# Patient Record
Sex: FEMALE | Race: WHITE | NOT HISPANIC OR LATINO | Employment: FULL TIME | URBAN - NONMETROPOLITAN AREA
[De-identification: names, ages, dates, MRNs, and addresses within clinical notes are randomized per-mention and may not be internally consistent; named-entity substitution may affect disease eponyms.]

---

## 2017-03-23 ENCOUNTER — HOSPITAL ENCOUNTER (EMERGENCY)
Facility: HOSPITAL | Age: 22
Discharge: HOME | End: 2017-03-23
Attending: FAMILY MEDICINE | Admitting: FAMILY MEDICINE

## 2017-03-23 ENCOUNTER — APPOINTMENT (OUTPATIENT)
Dept: GENERAL RADIOLOGY | Facility: HOSPITAL | Age: 22
End: 2017-03-23

## 2017-03-23 VITALS
HEIGHT: 63 IN | OXYGEN SATURATION: 98 % | HEART RATE: 85 BPM | BODY MASS INDEX: 32.96 KG/M2 | DIASTOLIC BLOOD PRESSURE: 73 MMHG | RESPIRATION RATE: 18 BRPM | WEIGHT: 186 LBS | TEMPERATURE: 98.4 F | SYSTOLIC BLOOD PRESSURE: 107 MMHG

## 2017-03-23 DIAGNOSIS — M79.675 GREAT TOE PAIN, LEFT: ICD-10-CM

## 2017-03-23 DIAGNOSIS — T14.8XXA SPLINTER IN SKIN: Primary | ICD-10-CM

## 2017-03-23 PROCEDURE — 99282 EMERGENCY DEPT VISIT SF MDM: CPT

## 2017-03-23 PROCEDURE — 73660 X-RAY EXAM OF TOE(S): CPT

## 2017-03-23 RX ORDER — CEPHALEXIN 500 MG/1
500 CAPSULE ORAL 3 TIMES DAILY
COMMUNITY
End: 2017-05-01

## 2017-03-23 NOTE — ED PROVIDER NOTES
Subjective   HPI Comments: Comes in after she had splinter in her left great toe - removed part of it and still feels small body .   Now has developed scab on top .       Patient is a 21 y.o. female presenting with lower extremity pain.   History provided by:  Patient  Lower Extremity Issue   Location:  Foot  Time since incident:  24 hours  Injury: yes    Mechanism of injury: fall    Mechanism of injury comment:  Had splinter in left great toe = she got some out but  very sure she still has it .  Fall:     Fall occurred:  Walking    Impact surface:  Gravel    Point of impact:  Feet    Entrapped after fall: no    Foot location:  Dorsum of L foot and L foot  Pain details:     Quality:  Aching    Radiates to:  Does not radiate    Severity:  No pain    Onset quality:  Sudden    Duration:  1 day    Timing:  Constant    Progression:  Worsening  Chronicity:  New  Dislocation: no    Foreign body present:  Wood (feels splinter in left great toe . )  Tetanus status:  Unknown  Prior injury to area:  No  Relieved by:  Nothing  Worsened by:  Nothing      Review of Systems   Constitutional: Negative.    HENT: Negative.    Respiratory: Negative.    Cardiovascular: Negative.    Gastrointestinal: Negative.    Musculoskeletal: Negative.    Allergic/Immunologic: Negative.    Neurological: Negative.    Hematological: Negative.    Psychiatric/Behavioral: Negative.        History reviewed. No pertinent past medical history.    No Known Allergies    History reviewed. No pertinent surgical history.    History reviewed. No pertinent family history.    Social History     Social History   • Marital status:      Spouse name: N/A   • Number of children: N/A   • Years of education: N/A     Social History Main Topics   • Smoking status: Never Smoker   • Smokeless tobacco: None   • Alcohol use No   • Drug use: No   • Sexual activity: Yes     Other Topics Concern   • None     Social History Narrative           Objective    /73  Pulse  "85  Temp 98.4 °F (36.9 °C) (Oral)   Resp 18  Ht 63\" (160 cm)  Wt 186 lb (84.4 kg)  SpO2 98%  BMI 32.95 kg/m2    Physical Exam   Constitutional: She is oriented to person, place, and time. She appears well-developed and well-nourished.   HENT:   Head: Normocephalic and atraumatic.   Right Ear: External ear normal.   Left Ear: External ear normal.   Nose: Nose normal.   Mouth/Throat: Oropharynx is clear and moist.   Eyes: Conjunctivae and EOM are normal. Pupils are equal, round, and reactive to light.   Neck: Normal range of motion. Neck supple.   Cardiovascular: Normal rate and regular rhythm.    Pulmonary/Chest: Effort normal and breath sounds normal. No accessory muscle usage. No respiratory distress. She exhibits no tenderness and no deformity.   Abdominal: Soft. There is no tenderness.   Musculoskeletal: Normal range of motion.        Neurological: She is alert and oriented to person, place, and time. She has normal reflexes.   Skin: Skin is warm.   Nursing note and vitals reviewed.      Procedures         ED Course  ED Course      Labs Reviewed - No data to display  Xr Toe 2+ View Left    Result Date: 3/23/2017  Narrative: Patient Name:  JULIETH GRAY Patient ID:  [id number] Ordering:  CHERI CASTILLO Attending:  CHERI CASTILLO Referring:  CHERI CASTILLO ------------------------------------------------ Procedure:  Left great toe.    Indication:  Injury, possible splinter.   . Technique:  Three views   . Prior relevant exam:  None.   No evidence of acute bony, soft tissue, or joint abnormality is noted. Bone mineralization is within normal limits. The visualized joint spaces appear intact. No radiopaque foreign bodies.     Impression: CONCLUSION: 1.  Normal left great toe. No fracture. No radiopaque foreign bodies.   Electronically signed by:  Kaleb Arellano MD  3/23/2017 2:19 PM CDT Workstation: [id number]    No visible  Foreign body noted.   Discussed with pt about possible small splinter left " inside - follow up with podiatry recommended.   She is scheduled to see dr chaudhary tomorrow AM .               MDM  Number of Diagnoses or Management Options  Great toe pain, left: new and does not require workup  Splinter in skin: new and does not require workup      Final diagnoses:   Splinter in skin   Great toe pain, left            Skylar Marquez MD  03/25/17 [id number]

## 2017-03-24 ENCOUNTER — OFFICE VISIT (OUTPATIENT)
Dept: PODIATRY | Facility: CLINIC | Age: 22
End: 2017-03-24

## 2017-03-24 VITALS — WEIGHT: 186 LBS | HEIGHT: 63 IN | BODY MASS INDEX: 32.96 KG/M2

## 2017-03-24 DIAGNOSIS — S91.142A: Primary | ICD-10-CM

## 2017-03-24 DIAGNOSIS — M79.672 LEFT FOOT PAIN: ICD-10-CM

## 2017-03-24 PROCEDURE — 10121 INC&RMVL FB SUBQ TISS COMP: CPT | Performed by: PODIATRIST

## 2017-03-24 PROCEDURE — 99203 OFFICE O/P NEW LOW 30 MIN: CPT | Performed by: PODIATRIST

## 2017-03-24 NOTE — PROGRESS NOTES
Tanja Chapa  1995  21 y.o. female   Patient presents today for splinter of the left great toe.  Patient states she did it on Wednesday.  Patient was seen in the ER yesterday.      3/24/2017  Chief Complaint   Patient presents with   • Left Foot - ER f/u, Foreign Body           History of Present Illness    This is a pleasant 21-year-old female who presents to clinic today with chief complaint of left foot pain.  States that last Wednesday she punctured the top of her left great toe with a stick.  The next day she went to the urgent care where they attempted to remove any remaining foreign body but were unsuccessful.  They discharged her from urgent care and told her that it would eventually come out of his own.  She continued to have pain and subsequently went to the emergency department on Thursday.  The ED physician contacted me and asked that this patient could follow up in my clinic.  She presents today for follow-up visit.  She is currently taking Keflex antibiotics for this.  States that yesterday her toe is much more red than it is today.  She still feels as if there is a foreign body in her toe.  However, the skin has healed over and she can no longer see it.  She has no other pedal complaints.         No past medical history on file.      No past surgical history on file.      No family history on file.      Social History     Social History   • Marital status:      Spouse name: N/A   • Number of children: N/A   • Years of education: N/A     Occupational History   • Not on file.     Social History Main Topics   • Smoking status: Never Smoker   • Smokeless tobacco: Not on file   • Alcohol use No   • Drug use: No   • Sexual activity: Yes     Other Topics Concern   • Not on file     Social History Narrative         Current Outpatient Prescriptions   Medication Sig Dispense Refill   • cephalexin (KEFLEX) 500 MG capsule Take 500 mg by mouth 3 (Three) Times a Day.       No current  "facility-administered medications for this visit.          OBJECTIVE    Ht 63\" (160 cm)  Wt 186 lb (84.4 kg)  BMI 32.95 kg/m2      Review of Systems   Constitutional: Negative for chills and fever.   Cardiovascular: Negative for chest pain.   Gastrointestinal: Negative for constipation, diarrhea, nausea and vomiting.   Skin: Negative for wound.   Musculoskeletal: left foot pain      Constitutional: well developed, well nourished    HEENT: Normocephalic and atraumatic, normal hearing    Respiratory: Non labored respirations noted    Cardiovascular:    DP/PT pulses palpable    CFT brisk  to all digits  Skin temp is warm to warm from proximal tibia to distal digits  Pedal hair growth present.   No edema noted   Mild erythema noted to the left hallux dorsally.    Musculoskeletal:  Muscle strength is 5/5 for all muscle groups tested   ROM of the 1st MTP is full without pain or crepitus  ROM of the MTJ is full without pain or crepitus    ROM of the STJ is full without pain or crepitus    ROM of the ankle joint is full without pain or crepitus    Significant tenderness palpation to the left hallux  Rectus foot type     Dermatological:   Nails 1-5 are within normal limits for length and thickness    Skin is warm, dry and intact    Webspaces 1-4 bilateral are clean, dry and intact.   No subcutaneous nodules or masses noted    Small puncture wound noted to the dorsal aspect of the left hallux approximately 0.5 cm proximal to the nail plate.  There is no foreign body directly visible.    Neurological:   Protective sensation intact    Sensation intact to light touch    DTR intact    Psychiatric: A&O x 3 with normal mood and affect. NAD.           Foreign Body Removal  Date/Time: 3/24/2017 12:24 PM  Performed by: AZAR CARO  Authorized by: AZAR CARO   Consent: Verbal consent obtained. Written consent obtained.  Risks and benefits: risks, benefits and alternatives were discussed  Consent given by: patient  Patient " understanding: patient states understanding of the procedure being performed  Patient identity confirmed: verbally with patient  Intake: left great toe.  Anesthesia: digital block    Anesthesia:  Anesthesia: digital block  Local Anesthetic: lidocaine 2% without epinephrine   Sedation:  Patient sedated: no    Patient restrained: no  Patient cooperative: yes  Complexity: complex  1 objects recovered.  Objects recovered: 0.5 cm sliver of wood  Post-procedure assessment: foreign body removed  Patient tolerance: Patient tolerated the procedure well with no immediate complications            ASSESSMENT AND PLAN    Tanja was seen today for er f/u and foreign body.    Diagnoses and all orders for this visit:    Puncture wound with foreign body of left great toe without damage to nail, initial encounter    Left foot pain    - Comprehensive foot and ankle exam performed  - X-rays reviewed taken in the ED.  No foreign body noted  - There was concern of remaining deep foreign body.  Verbal and written consent were obtained and the left hallux was localized with 2% lidocaine plain. The toe was prepped with betadine emanuel a penrose tourniquet was applied to the hallux. Next an incision was made over the visible puncture wound with a #15 blade.  The incision site was explored with a blunt dissection using a  Hemostat. Dissection was carried down deep to the subcutaneous tissue and a 0.5 cm long foreign body was excised and removed.  The area was then flushed with copious amounts of normal saline solution.  Next the incision site was reapproximated loosely with 4-0 nylon in a simple interrupted technique.  Antibiotic ointment and a dry sterile dressing was applied.  The patient tolerated the procedure well without immediate complication.  - Keep dressing c/d/i until f/u visit  - continue abx until course is complete  - All questions were answered and the patient is in agreement with the current treatment plan.  - RTC Tuesday  3/28/17            This document has been electronically signed by Crescencio Magdaleno DPM on March 24, 2017 12:14 PM     3/24/2017  12:14 PM

## 2017-03-28 ENCOUNTER — OFFICE VISIT (OUTPATIENT)
Dept: PODIATRY | Facility: CLINIC | Age: 22
End: 2017-03-28

## 2017-03-28 VITALS — WEIGHT: 186 LBS | HEIGHT: 63 IN | BODY MASS INDEX: 32.96 KG/M2

## 2017-03-28 DIAGNOSIS — S91.142A: Primary | ICD-10-CM

## 2017-03-28 PROCEDURE — 99024 POSTOP FOLLOW-UP VISIT: CPT | Performed by: PODIATRIST

## 2017-03-28 NOTE — PROGRESS NOTES
"Tanja Franciashaina Chapa  1995  21 y.o. female   Patient presents today for f/u of the left great toe.      3/28/2017  Chief Complaint   Patient presents with   • Left Foot - ER F/U, Foreign Body           History of Present Illness    Patient presents to clinic today for follow-up of her great toe puncture wound.  Last visit for body was removed in the clinic.  She is doing very well today.  She has no pain today.  She    is taking her antibiotics.  She has no new complaints today.      No past medical history on file.      No past surgical history on file.      No family history on file.      Social History     Social History   • Marital status:      Spouse name: N/A   • Number of children: N/A   • Years of education: N/A     Occupational History   • Not on file.     Social History Main Topics   • Smoking status: Never Smoker   • Smokeless tobacco: Not on file   • Alcohol use No   • Drug use: No   • Sexual activity: Yes     Other Topics Concern   • Not on file     Social History Narrative         Current Outpatient Prescriptions   Medication Sig Dispense Refill   • cephalexin (KEFLEX) 500 MG capsule Take 500 mg by mouth 3 (Three) Times a Day.       No current facility-administered medications for this visit.          OBJECTIVE    Ht 63\" (160 cm)  Wt 186 lb (84.4 kg)  BMI 32.95 kg/m2      Review of Systems   Constitutional: Negative for chills and fever.   Cardiovascular: Negative for chest pain.   Gastrointestinal: Negative for constipation, diarrhea, nausea and vomiting.   Skin: Negative for wound.   Musculoskeletal:  Negative foot pain      Constitutional: well developed, well nourished    HEENT: Normocephalic and atraumatic, normal hearing    Respiratory: Non labored respirations noted    LLE:   No erythema or edema noted to the hallux.  Incision site is well approximated with sutures intact.  No signs of dehiscence noted.  No signs of infection noted.          Procedures        ASSESSMENT AND " REGAN Agrawal was seen today for er f/u and foreign body.    Diagnoses and all orders for this visit:    Puncture wound with foreign body of left great toe without damage to nail, subsequent encounter    - Sterile dressing change   - Continue antibiotics until course complete.   - All questions were answered and the patient is in agreement with the current treatment plan.  - RTC 1 week            This document has been electronically signed by Crescencio Magdaleno DPM on March 28, 2017 6:08 PM     3/28/2017  6:08 PM

## 2017-04-04 ENCOUNTER — OFFICE VISIT (OUTPATIENT)
Dept: PODIATRY | Facility: CLINIC | Age: 22
End: 2017-04-04

## 2017-04-04 VITALS — BODY MASS INDEX: 32.96 KG/M2 | WEIGHT: 186 LBS | HEIGHT: 63 IN

## 2017-04-04 DIAGNOSIS — S91.142A: Primary | ICD-10-CM

## 2017-04-04 PROCEDURE — 99212 OFFICE O/P EST SF 10 MIN: CPT | Performed by: PODIATRIST

## 2017-04-04 NOTE — PROGRESS NOTES
"Tanja Feldman Eduard  1995  21 y.o. female   Patient presents today for f/u of the left great toe.      4/4/2017  Chief Complaint   Patient presents with   • Left Foot - Follow-up           History of Present Illness    Patient presents to clinic today for follow-up of her great toe puncture wound She is doing very well today.  She has no pain today.  She has no new complaints today.      No past medical history on file.      No past surgical history on file.      No family history on file.      Social History     Social History   • Marital status:      Spouse name: N/A   • Number of children: N/A   • Years of education: N/A     Occupational History   • Not on file.     Social History Main Topics   • Smoking status: Never Smoker   • Smokeless tobacco: Not on file   • Alcohol use No   • Drug use: No   • Sexual activity: Yes     Other Topics Concern   • Not on file     Social History Narrative         Current Outpatient Prescriptions   Medication Sig Dispense Refill   • cephalexin (KEFLEX) 500 MG capsule Take 500 mg by mouth 3 (Three) Times a Day.       No current facility-administered medications for this visit.          OBJECTIVE    Ht 63\" (160 cm)  Wt 186 lb (84.4 kg)  BMI 32.95 kg/m2      Review of Systems   Constitutional: Negative for chills and fever.   Cardiovascular: Negative for chest pain.   Gastrointestinal: Negative for constipation, diarrhea, nausea and vomiting.   Skin: Negative for wound.   Musculoskeletal:  Negative foot pain      Constitutional: well developed, well nourished    HEENT: Normocephalic and atraumatic, normal hearing    Respiratory: Non labored respirations noted    LLE:   No erythema or edema noted to the hallux.  Incision site is well approximated with sutures intact.  No signs of dehiscence noted.  No signs of infection noted.          Procedures        ASSESSMENT AND PLAN    Tanja was seen today for follow-up.    Diagnoses and all orders for this visit:    Puncture " wound with foreign body of left great toe without damage to nail, subsequent encounter    - Sutures removed. Dressed with bacitracin and a band-aid.   - All questions were answered and the patient is in agreement with the current treatment plan.  - RTC 2 weeks            This document has been electronically signed by Crescencio Magdaleno DPM on April 4, 2017 6:22 PM     4/4/2017  6:22 PM

## 2017-04-18 ENCOUNTER — OFFICE VISIT (OUTPATIENT)
Dept: PODIATRY | Facility: CLINIC | Age: 22
End: 2017-04-18

## 2017-04-18 VITALS — BODY MASS INDEX: 32.96 KG/M2 | HEIGHT: 63 IN | WEIGHT: 186 LBS

## 2017-04-18 DIAGNOSIS — S91.142A: Primary | ICD-10-CM

## 2017-04-18 PROCEDURE — 99212 OFFICE O/P EST SF 10 MIN: CPT | Performed by: PODIATRIST

## 2017-04-18 NOTE — PROGRESS NOTES
"Tanja Feldman Eduard  1995  21 y.o. female   Patient presents today for f/u of the left great toe.      4/18/17    Chief Complaint   Patient presents with   • Left Foot - Follow-up           History of Present Illness    Patient presents to clinic today for follow-up of her great toe puncture wound She is doing very well today.  She has no pain today.  She has no new complaints today.      No past medical history on file.      No past surgical history on file.      No family history on file.      Social History     Social History   • Marital status:      Spouse name: N/A   • Number of children: N/A   • Years of education: N/A     Occupational History   • Not on file.     Social History Main Topics   • Smoking status: Never Smoker   • Smokeless tobacco: Not on file   • Alcohol use No   • Drug use: No   • Sexual activity: Yes     Other Topics Concern   • Not on file     Social History Narrative         Current Outpatient Prescriptions   Medication Sig Dispense Refill   • cephalexin (KEFLEX) 500 MG capsule Take 500 mg by mouth 3 (Three) Times a Day.       No current facility-administered medications for this visit.          OBJECTIVE    Ht 63\" (160 cm)  Wt 186 lb (84.4 kg)  BMI 32.95 kg/m2      Review of Systems   Constitutional: Negative for chills and fever.   Cardiovascular: Negative for chest pain.   Gastrointestinal: Negative for constipation, diarrhea, nausea and vomiting.   Skin: Negative for wound.   Musculoskeletal:  Negative foot pain      Constitutional: well developed, well nourished    HEENT: Normocephalic and atraumatic, normal hearing    Respiratory: Non labored respirations noted    LLE:   No erythema or edema noted to the hallux.  Incision site is healed.    No signs of infection noted.          Procedures        ASSESSMENT AND PLAN    Tanja was seen today for follow-up.    Diagnoses and all orders for this visit:    Puncture wound with foreign body of left great toe without damage to " nail, subsequent encounter    - pt is healed  - will dc from care  - All questions were answered and the patient is in agreement with the current treatment plan.  - RTC prn              This document has been electronically signed by Crescencio Magdaleno DPM on April 20, 2017 10:36 AM     4/20/2017  10:36 AM

## 2017-05-01 ENCOUNTER — INITIAL PRENATAL (OUTPATIENT)
Dept: OBSTETRICS AND GYNECOLOGY | Facility: CLINIC | Age: 22
End: 2017-05-01

## 2017-05-01 ENCOUNTER — APPOINTMENT (OUTPATIENT)
Dept: LAB | Facility: HOSPITAL | Age: 22
End: 2017-05-01

## 2017-05-01 VITALS
HEIGHT: 63 IN | SYSTOLIC BLOOD PRESSURE: 132 MMHG | BODY MASS INDEX: 33.84 KG/M2 | WEIGHT: 191 LBS | DIASTOLIC BLOOD PRESSURE: 83 MMHG

## 2017-05-01 DIAGNOSIS — Z34.81 PRENATAL CARE, SUBSEQUENT PREGNANCY, FIRST TRIMESTER: Primary | ICD-10-CM

## 2017-05-01 DIAGNOSIS — Z3A.00 WEEKS OF GESTATION OF PREGNANCY NOT SPECIFIED: ICD-10-CM

## 2017-05-01 DIAGNOSIS — Z32.00 PREGNANCY EXAMINATION OR TEST, PREGNANCY UNCONFIRMED: ICD-10-CM

## 2017-05-01 LAB
ABO GROUP BLD: NORMAL
B-HCG UR QL: POSITIVE
BACTERIA UR QL AUTO: ABNORMAL /HPF
BASOPHILS # BLD AUTO: 0.01 10*3/MM3 (ref 0–0.2)
BASOPHILS NFR BLD AUTO: 0.1 % (ref 0–2)
BILIRUB UR QL STRIP: NEGATIVE
BLD GP AB SCN SERPL QL: NEGATIVE
CLARITY UR: ABNORMAL
COLOR UR: YELLOW
DEPRECATED RDW RBC AUTO: 39.1 FL (ref 36.4–46.3)
EOSINOPHIL # BLD AUTO: 0.02 10*3/MM3 (ref 0–0.7)
EOSINOPHIL NFR BLD AUTO: 0.2 % (ref 0–7)
ERYTHROCYTE [DISTWIDTH] IN BLOOD BY AUTOMATED COUNT: 12.9 % (ref 11.5–14.5)
GLUCOSE UR STRIP-MCNC: NEGATIVE MG/DL
HCT VFR BLD AUTO: 36.1 % (ref 35–45)
HGB BLD-MCNC: 12.6 G/DL (ref 12–15.5)
HGB UR QL STRIP.AUTO: NEGATIVE
HYALINE CASTS UR QL AUTO: ABNORMAL /LPF
IMM GRANULOCYTES # BLD: 0.02 10*3/MM3 (ref 0–0.02)
IMM GRANULOCYTES NFR BLD: 0.2 % (ref 0–0.5)
INTERNAL NEGATIVE CONTROL: NEGATIVE
INTERNAL POSITIVE CONTROL: POSITIVE
KETONES UR QL STRIP: NEGATIVE
LEUKOCYTE ESTERASE UR QL STRIP.AUTO: ABNORMAL
LYMPHOCYTES # BLD AUTO: 2.17 10*3/MM3 (ref 0.6–4.2)
LYMPHOCYTES NFR BLD AUTO: 27.1 % (ref 10–50)
Lab: ABNORMAL
Lab: NORMAL
MCH RBC QN AUTO: 28.8 PG (ref 26.5–34)
MCHC RBC AUTO-ENTMCNC: 34.9 G/DL (ref 31.4–36)
MCV RBC AUTO: 82.4 FL (ref 80–98)
MONOCYTES # BLD AUTO: 0.54 10*3/MM3 (ref 0–0.9)
MONOCYTES NFR BLD AUTO: 6.7 % (ref 0–12)
NEUTROPHILS # BLD AUTO: 5.25 10*3/MM3 (ref 2–8.6)
NEUTROPHILS NFR BLD AUTO: 65.7 % (ref 37–80)
NITRITE UR QL STRIP: NEGATIVE
PH UR STRIP.AUTO: 6 [PH] (ref 5–9)
PLATELET # BLD AUTO: 233 10*3/MM3 (ref 150–450)
PMV BLD AUTO: 9.6 FL (ref 8–12)
PROT UR QL STRIP: NEGATIVE
RBC # BLD AUTO: 4.38 10*6/MM3 (ref 3.77–5.16)
RBC # UR: ABNORMAL /HPF
REF LAB TEST METHOD: ABNORMAL
RH BLD: POSITIVE
SP GR UR STRIP: 1.02 (ref 1–1.03)
SQUAMOUS #/AREA URNS HPF: ABNORMAL /HPF
UROBILINOGEN UR QL STRIP: ABNORMAL
WBC NRBC COR # BLD: 8.01 10*3/MM3 (ref 3.2–9.8)
WBC UR QL AUTO: ABNORMAL /HPF

## 2017-05-01 PROCEDURE — 36415 COLL VENOUS BLD VENIPUNCTURE: CPT | Performed by: ADVANCED PRACTICE MIDWIFE

## 2017-05-01 PROCEDURE — 81025 URINE PREGNANCY TEST: CPT | Performed by: ADVANCED PRACTICE MIDWIFE

## 2017-05-01 PROCEDURE — 81001 URINALYSIS AUTO W/SCOPE: CPT | Performed by: ADVANCED PRACTICE MIDWIFE

## 2017-05-01 PROCEDURE — 86701 HIV-1ANTIBODY: CPT | Performed by: ADVANCED PRACTICE MIDWIFE

## 2017-05-01 PROCEDURE — 86803 HEPATITIS C AB TEST: CPT | Performed by: ADVANCED PRACTICE MIDWIFE

## 2017-05-01 PROCEDURE — 99213 OFFICE O/P EST LOW 20 MIN: CPT | Performed by: ADVANCED PRACTICE MIDWIFE

## 2017-05-01 PROCEDURE — 87086 URINE CULTURE/COLONY COUNT: CPT | Performed by: ADVANCED PRACTICE MIDWIFE

## 2017-05-01 PROCEDURE — 87491 CHLMYD TRACH DNA AMP PROBE: CPT | Performed by: ADVANCED PRACTICE MIDWIFE

## 2017-05-01 PROCEDURE — 87591 N.GONORRHOEAE DNA AMP PROB: CPT | Performed by: ADVANCED PRACTICE MIDWIFE

## 2017-05-01 PROCEDURE — 88142 CYTOPATH C/V THIN LAYER: CPT | Performed by: ADVANCED PRACTICE MIDWIFE

## 2017-05-01 PROCEDURE — G0432 EIA HIV-1/HIV-2 SCREEN: HCPCS | Performed by: ADVANCED PRACTICE MIDWIFE

## 2017-05-01 PROCEDURE — 88141 CYTOPATH C/V INTERPRET: CPT | Performed by: PATHOLOGY

## 2017-05-01 PROCEDURE — 86702 HIV-2 ANTIBODY: CPT | Performed by: ADVANCED PRACTICE MIDWIFE

## 2017-05-01 PROCEDURE — 87147 CULTURE TYPE IMMUNOLOGIC: CPT | Performed by: ADVANCED PRACTICE MIDWIFE

## 2017-05-01 PROCEDURE — 87077 CULTURE AEROBIC IDENTIFY: CPT | Performed by: ADVANCED PRACTICE MIDWIFE

## 2017-05-02 PROBLEM — B95.1 GROUP B STREPTOCOCCUS URINARY TRACT INFECTION AFFECTING PREGNANCY IN FIRST TRIMESTER: Status: ACTIVE | Noted: 2017-05-02

## 2017-05-02 PROBLEM — O23.41 GROUP B STREPTOCOCCUS URINARY TRACT INFECTION AFFECTING PREGNANCY IN FIRST TRIMESTER: Status: ACTIVE | Noted: 2017-05-02

## 2017-05-02 LAB
BACTERIA SPEC AEROBE CULT: ABNORMAL
HBV SURFACE AG SERPL QL IA: NEGATIVE
HCV AB SER DONR QL: NEGATIVE
HIV1+2 AB SER QL: REACTIVE
RPR SER QL: NORMAL
RUBV IGG SER QL: ABNORMAL
RUBV IGG SER-ACNC: 193 IU/ML (ref 0–9.9)

## 2017-05-02 RX ORDER — AMPICILLIN 500 MG/1
500 CAPSULE ORAL 4 TIMES DAILY
Qty: 28 CAPSULE | Refills: 0 | Status: SHIPPED | OUTPATIENT
Start: 2017-05-02 | End: 2017-05-09

## 2017-05-03 LAB
C TRACH RRNA CVX QL NAA+PROBE: NOT DETECTED
HIV 1 & 2 AB SERPLBLD IA.RAPID: NORMAL
HIV 2 AB SERPLBLD QL IA.RAPID: NEGATIVE
HIV1 AB SERPLBLD QL IA.RAPID: NEGATIVE
LAB AP CASE REPORT: NORMAL
LAB AP GYN ADDITIONAL INFORMATION: NORMAL
LAB AP GYN OTHER FINDINGS: NORMAL
Lab: NORMAL
N GONORRHOEA RRNA SPEC QL NAA+PROBE: NOT DETECTED
PATH INTERP SPEC-IMP: NORMAL
STAT OF ADQ CVX/VAG CYTO-IMP: NORMAL

## 2017-05-08 ENCOUNTER — ROUTINE PRENATAL (OUTPATIENT)
Dept: OBSTETRICS AND GYNECOLOGY | Facility: CLINIC | Age: 22
End: 2017-05-08

## 2017-05-08 VITALS — BODY MASS INDEX: 33.66 KG/M2 | SYSTOLIC BLOOD PRESSURE: 131 MMHG | WEIGHT: 187 LBS | DIASTOLIC BLOOD PRESSURE: 86 MMHG

## 2017-05-08 DIAGNOSIS — B95.1 GROUP B STREPTOCOCCUS URINARY TRACT INFECTION AFFECTING PREGNANCY IN FIRST TRIMESTER: Primary | ICD-10-CM

## 2017-05-08 DIAGNOSIS — Z3A.12 12 WEEKS GESTATION OF PREGNANCY: ICD-10-CM

## 2017-05-08 DIAGNOSIS — O23.41 GROUP B STREPTOCOCCUS URINARY TRACT INFECTION AFFECTING PREGNANCY IN FIRST TRIMESTER: Primary | ICD-10-CM

## 2017-05-08 PROCEDURE — 99212 OFFICE O/P EST SF 10 MIN: CPT | Performed by: ADVANCED PRACTICE MIDWIFE

## 2017-06-05 ENCOUNTER — ROUTINE PRENATAL (OUTPATIENT)
Dept: OBSTETRICS AND GYNECOLOGY | Facility: CLINIC | Age: 22
End: 2017-06-05

## 2017-06-05 ENCOUNTER — APPOINTMENT (OUTPATIENT)
Dept: LAB | Facility: HOSPITAL | Age: 22
End: 2017-06-05

## 2017-06-05 VITALS — SYSTOLIC BLOOD PRESSURE: 124 MMHG | DIASTOLIC BLOOD PRESSURE: 81 MMHG | WEIGHT: 193 LBS | BODY MASS INDEX: 34.74 KG/M2

## 2017-06-05 DIAGNOSIS — B95.1 GROUP B STREPTOCOCCUS URINARY TRACT INFECTION AFFECTING PREGNANCY IN FIRST TRIMESTER: ICD-10-CM

## 2017-06-05 DIAGNOSIS — Z36.9 ANTENATAL SCREENING ENCOUNTER: Primary | ICD-10-CM

## 2017-06-05 DIAGNOSIS — Z36.89 ENCOUNTER FOR FETAL ANATOMIC SURVEY: ICD-10-CM

## 2017-06-05 DIAGNOSIS — O23.41 GROUP B STREPTOCOCCUS URINARY TRACT INFECTION AFFECTING PREGNANCY IN FIRST TRIMESTER: ICD-10-CM

## 2017-06-05 DIAGNOSIS — Z3A.16 16 WEEKS GESTATION OF PREGNANCY: ICD-10-CM

## 2017-06-05 PROCEDURE — 82105 ALPHA-FETOPROTEIN SERUM: CPT | Performed by: ADVANCED PRACTICE MIDWIFE

## 2017-06-05 PROCEDURE — 86336 INHIBIN A: CPT | Performed by: ADVANCED PRACTICE MIDWIFE

## 2017-06-05 PROCEDURE — 84702 CHORIONIC GONADOTROPIN TEST: CPT | Performed by: ADVANCED PRACTICE MIDWIFE

## 2017-06-05 PROCEDURE — 99212 OFFICE O/P EST SF 10 MIN: CPT | Performed by: ADVANCED PRACTICE MIDWIFE

## 2017-06-05 PROCEDURE — 82677 ASSAY OF ESTRIOL: CPT | Performed by: ADVANCED PRACTICE MIDWIFE

## 2017-06-05 PROCEDURE — 36415 COLL VENOUS BLD VENIPUNCTURE: CPT | Performed by: ADVANCED PRACTICE MIDWIFE

## 2017-06-05 RX ORDER — PRENATAL VIT/IRON FUM/FOLIC AC 65 MG-1 MG
1 TABLET ORAL DAILY
Qty: 30 EACH | Refills: 12 | Status: SHIPPED | OUTPATIENT
Start: 2017-06-05 | End: 2018-04-16 | Stop reason: HOSPADM

## 2017-06-05 NOTE — PROGRESS NOTES
CC: ANDREWS visit, history reviewed see history tabs. Patient reports hx of palpitations but declined cardiologist consult. Stated that she saw the cardiologist saw her last year and it did not help.     ROS: Negative leaking fluid from the vagina, swelling in her legs, headache, visual changes, low back pain and heartburn    Educated on:Quad screen ordered    Plan: f/u in 2 week/s

## 2017-06-08 LAB
2ND TRIMESTER 4 SCREEN SERPL-IMP: NORMAL
AFP ADJ MOM SERPL: 0.86
AFP INTERP SERPL-IMP: NORMAL
AFP SERPL-MCNC: 26.4 NG/ML
AGE AT DELIVERY: 24.5 YEARS
FET TS 18 RISK FROM MAT AGE: NORMAL
FET TS 21 RISK FROM MAT AGE: 1052
GA METHOD: NORMAL
GA: 16.7 WEEKS
HCG ADJ MOM SERPL: 1.3
HCG SERPL-ACNC: NORMAL MIU/ML
IDDM PATIENT QL: NO
INHIBIN A ADJ MOM SERPL: 0.89
INHIBIN A SERPL-MCNC: 134.33 PG/ML
LABORATORY COMMENT REPORT: NORMAL
MULTIPLE PREGNANCY: NO
NEURAL TUBE DEFECT RISK FETUS: NORMAL %
RESULT: NORMAL
TS 18 RISK FETUS: NORMAL
TS 21 RISK FETUS: 2721
U ESTRIOL ADJ MOM SERPL: 0.75
U ESTRIOL SERPL-MCNC: 0.69 NG/ML

## 2017-06-21 ENCOUNTER — ROUTINE PRENATAL (OUTPATIENT)
Dept: OBSTETRICS AND GYNECOLOGY | Facility: CLINIC | Age: 22
End: 2017-06-21

## 2017-06-21 VITALS — SYSTOLIC BLOOD PRESSURE: 125 MMHG | BODY MASS INDEX: 34.92 KG/M2 | WEIGHT: 194 LBS | DIASTOLIC BLOOD PRESSURE: 73 MMHG

## 2017-06-21 DIAGNOSIS — B95.1 GROUP B STREPTOCOCCUS URINARY TRACT INFECTION AFFECTING PREGNANCY IN FIRST TRIMESTER: Primary | ICD-10-CM

## 2017-06-21 DIAGNOSIS — O23.41 GROUP B STREPTOCOCCUS URINARY TRACT INFECTION AFFECTING PREGNANCY IN FIRST TRIMESTER: Primary | ICD-10-CM

## 2017-06-21 DIAGNOSIS — Z3A.19 19 WEEKS GESTATION OF PREGNANCY: ICD-10-CM

## 2017-06-21 PROCEDURE — 99212 OFFICE O/P EST SF 10 MIN: CPT | Performed by: ADVANCED PRACTICE MIDWIFE

## 2017-06-21 NOTE — PROGRESS NOTES
CC: ANDREWS visit, history reviewed see history tabs.     ROS: Negative leaking fluid from the vagina, swelling in her legs, headache, visual changes, low back pain and heartburn      Educated on:reviewed US- normal, Quad normal     Plan: f/u in 4 week/s

## 2017-07-18 ENCOUNTER — ROUTINE PRENATAL (OUTPATIENT)
Dept: OBSTETRICS AND GYNECOLOGY | Facility: CLINIC | Age: 22
End: 2017-07-18

## 2017-07-18 VITALS — WEIGHT: 200 LBS | DIASTOLIC BLOOD PRESSURE: 72 MMHG | BODY MASS INDEX: 36 KG/M2 | SYSTOLIC BLOOD PRESSURE: 128 MMHG

## 2017-07-18 DIAGNOSIS — B95.1 GROUP B STREPTOCOCCUS URINARY TRACT INFECTION AFFECTING PREGNANCY IN FIRST TRIMESTER: ICD-10-CM

## 2017-07-18 DIAGNOSIS — O23.41 GROUP B STREPTOCOCCUS URINARY TRACT INFECTION AFFECTING PREGNANCY IN FIRST TRIMESTER: ICD-10-CM

## 2017-07-18 DIAGNOSIS — Z34.82 PRENATAL CARE, SUBSEQUENT PREGNANCY, SECOND TRIMESTER: Primary | ICD-10-CM

## 2017-07-18 DIAGNOSIS — Z36.9 ANTENATAL SCREENING ENCOUNTER: ICD-10-CM

## 2017-07-18 DIAGNOSIS — Z3A.22 22 WEEKS GESTATION OF PREGNANCY: ICD-10-CM

## 2017-07-18 PROCEDURE — 99212 OFFICE O/P EST SF 10 MIN: CPT | Performed by: ADVANCED PRACTICE MIDWIFE

## 2017-07-18 NOTE — PROGRESS NOTES
CC: ANDREWS visit, history reviewed see history tabs.     ROS:Positive pelvic pressure/supbrapubic discomfort  Negative headache, vaginal bleeding and dysuria    Educated on: comfort measures of pelvic pressure; recommended a maternity belt    Plan: f/u in 4 week/s with diabetes screening

## 2017-08-15 ENCOUNTER — LAB (OUTPATIENT)
Dept: LAB | Facility: HOSPITAL | Age: 22
End: 2017-08-15

## 2017-08-15 ENCOUNTER — ROUTINE PRENATAL (OUTPATIENT)
Dept: OBSTETRICS AND GYNECOLOGY | Facility: CLINIC | Age: 22
End: 2017-08-15

## 2017-08-15 ENCOUNTER — RESULTS ENCOUNTER (OUTPATIENT)
Dept: OBSTETRICS AND GYNECOLOGY | Facility: CLINIC | Age: 22
End: 2017-08-15

## 2017-08-15 VITALS — SYSTOLIC BLOOD PRESSURE: 125 MMHG | WEIGHT: 204 LBS | BODY MASS INDEX: 36.72 KG/M2 | DIASTOLIC BLOOD PRESSURE: 74 MMHG

## 2017-08-15 DIAGNOSIS — Z36.9 ANTENATAL SCREENING ENCOUNTER: ICD-10-CM

## 2017-08-15 DIAGNOSIS — O22.10: ICD-10-CM

## 2017-08-15 DIAGNOSIS — O26.899 PAIN OF ROUND LIGAMENT AFFECTING PREGNANCY, ANTEPARTUM: Primary | ICD-10-CM

## 2017-08-15 DIAGNOSIS — Z3A.26 26 WEEKS GESTATION OF PREGNANCY: ICD-10-CM

## 2017-08-15 DIAGNOSIS — R10.2 PAIN OF ROUND LIGAMENT AFFECTING PREGNANCY, ANTEPARTUM: Primary | ICD-10-CM

## 2017-08-15 DIAGNOSIS — O23.41 GROUP B STREPTOCOCCUS URINARY TRACT INFECTION AFFECTING PREGNANCY IN FIRST TRIMESTER: ICD-10-CM

## 2017-08-15 DIAGNOSIS — B95.1 GROUP B STREPTOCOCCUS URINARY TRACT INFECTION AFFECTING PREGNANCY IN FIRST TRIMESTER: ICD-10-CM

## 2017-08-15 LAB
BASOPHILS # BLD AUTO: 0.01 10*3/MM3 (ref 0–0.2)
BASOPHILS NFR BLD AUTO: 0.1 % (ref 0–2)
DEPRECATED RDW RBC AUTO: 41.7 FL (ref 36.4–46.3)
EOSINOPHIL # BLD AUTO: 0 10*3/MM3 (ref 0–0.7)
EOSINOPHIL NFR BLD AUTO: 0 % (ref 0–7)
ERYTHROCYTE [DISTWIDTH] IN BLOOD BY AUTOMATED COUNT: 13.6 % (ref 11.5–14.5)
GLUCOSE 1H P 100 G GLC PO SERPL-MCNC: 130 MG/DL (ref 60–140)
HCT VFR BLD AUTO: 29.9 % (ref 35–45)
HGB BLD-MCNC: 9.7 G/DL (ref 12–15.5)
IMM GRANULOCYTES # BLD: 0.05 10*3/MM3 (ref 0–0.02)
IMM GRANULOCYTES NFR BLD: 0.6 % (ref 0–0.5)
LYMPHOCYTES # BLD AUTO: 1.79 10*3/MM3 (ref 0.6–4.2)
LYMPHOCYTES NFR BLD AUTO: 22 % (ref 10–50)
MCH RBC QN AUTO: 26.9 PG (ref 26.5–34)
MCHC RBC AUTO-ENTMCNC: 32.4 G/DL (ref 31.4–36)
MCV RBC AUTO: 82.8 FL (ref 80–98)
MONOCYTES # BLD AUTO: 0.38 10*3/MM3 (ref 0–0.9)
MONOCYTES NFR BLD AUTO: 4.7 % (ref 0–12)
NEUTROPHILS # BLD AUTO: 5.9 10*3/MM3 (ref 2–8.6)
NEUTROPHILS NFR BLD AUTO: 72.6 % (ref 37–80)
PLATELET # BLD AUTO: 203 10*3/MM3 (ref 150–450)
PMV BLD AUTO: 9.5 FL (ref 8–12)
RBC # BLD AUTO: 3.61 10*6/MM3 (ref 3.77–5.16)
WBC NRBC COR # BLD: 8.13 10*3/MM3 (ref 3.2–9.8)

## 2017-08-15 PROCEDURE — 36415 COLL VENOUS BLD VENIPUNCTURE: CPT | Performed by: ADVANCED PRACTICE MIDWIFE

## 2017-08-15 PROCEDURE — 85025 COMPLETE CBC W/AUTO DIFF WBC: CPT | Performed by: ADVANCED PRACTICE MIDWIFE

## 2017-08-15 PROCEDURE — 99212 OFFICE O/P EST SF 10 MIN: CPT | Performed by: ADVANCED PRACTICE MIDWIFE

## 2017-08-15 PROCEDURE — 82950 GLUCOSE TEST: CPT | Performed by: ADVANCED PRACTICE MIDWIFE

## 2017-08-15 RX ORDER — DOCUSATE SODIUM 100 MG/1
100 CAPSULE, LIQUID FILLED ORAL DAILY PRN
Qty: 30 CAPSULE | Refills: 10 | Status: SHIPPED | OUTPATIENT
Start: 2017-08-15 | End: 2017-08-31 | Stop reason: HOSPADM

## 2017-08-15 NOTE — PROGRESS NOTES
CC: ANDREWS visit, history reviewed see history tabs.     ROS:Positive constipation, round ligament pain, vaginal varicose veins     Negative leaking fluid from the vagina, swelling in her legs, headache, visual changes, low back pain and heartburn      Educated on:Fetal movement after 28 weeks     A/Plan: f/u in 3 week/s, referred to PT   Tanja was seen today for routine prenatal visit.    Diagnoses and all orders for this visit:    Pain of round ligament affecting pregnancy, antepartum  -     Ambulatory Referral to Physical Therapy Evaluate and treat (vaginal varicose veins, round ligament pain), Women's Health    Group B Streptococcus urinary tract infection affecting pregnancy in first trimester  -     Ambulatory Referral to Physical Therapy Evaluate and treat (vaginal varicose veins, round ligament pain), Women's Health    26 weeks gestation of pregnancy  -     Ambulatory Referral to Physical Therapy Evaluate and treat (vaginal varicose veins, round ligament pain), Women's Health    Varicose veins of vulva during pregnancy    Other orders  -     docusate sodium (COLACE) 100 MG capsule; Take 1 capsule by mouth Daily As Needed for Constipation.

## 2017-08-28 ENCOUNTER — HOSPITAL ENCOUNTER (OUTPATIENT)
Dept: PHYSICAL THERAPY | Facility: HOSPITAL | Age: 22
Setting detail: THERAPIES SERIES
Discharge: HOME | End: 2017-08-28

## 2017-08-28 DIAGNOSIS — R10.2 PAIN OF ROUND LIGAMENT AFFECTING PREGNANCY, ANTEPARTUM: Primary | ICD-10-CM

## 2017-08-28 DIAGNOSIS — O26.899 PAIN OF ROUND LIGAMENT AFFECTING PREGNANCY, ANTEPARTUM: Primary | ICD-10-CM

## 2017-08-28 PROCEDURE — 97162 PT EVAL MOD COMPLEX 30 MIN: CPT | Performed by: PHYSICAL THERAPIST

## 2017-08-28 NOTE — THERAPY EVALUATION
Outpatient Physical Therapy Women's Health Initial Evaluation  [facility]     Patient Name: Tanja Brice  : 1995  MRN: [id number]  Today's Date: 2017        Visit Date: 2017   Visit Number:    Insurance: pending auth  Recheck: 17  RTD: Thursday      Patient Active Problem List   Diagnosis   • Group B Streptococcus urinary tract infection affecting pregnancy in first trimester        Past Medical History:   Diagnosis Date   • Anxiety    • Chest pain, unspecified    • Contact dermatitis due to poison ivy    • Exanthem due to herpes zoster    • H/O echocardiogram 12/15/2015    Normal LV function with Ef 60% without regional wall motion abnormalities. Normal Ev size with normal function. Normal diastolic function. No significant valvular regurgitation or stenosis   • Herpes zoster ophthalmicus     no ocular involvement      • History of chicken pox    • History of Holter monitoring 12/15/2015    Sinus mechanism with normal average heart rate with no evicence of chronotropic incompetence. Normal burden of ventricular and supraventricular ectopic event. Symptoms correlated only with sinus mechanism   • Migraine    • Palpitations    • Shingles    • Temporomandibular joint disorder         Past Surgical History:   Procedure Laterality Date   • FOREIGN BODY REMOVAL Left 2017    splinter removed from left great toe. nerve block used.    • WISDOM TOOTH EXTRACTION           Visit Dx:    ICD-10-CM ICD-9-CM   1. Pain of round ligament affecting pregnancy, antepartum O26.899 646.83    R10.2 625.9                   PT Ortho       17 0900    Subjective Comments    Subjective Comments , 28 weeks gestation with  EDC 11/15/17.  Two other children at home are 3 and 4 years of age.  Notes that first pregnancy caused a diastasis or separation of abdominal wall.  Noted at end of first pregnancy.  Thought it somewhat closed with post partum but opened again with second pregnancy  and worsening with this pregnancy.  Notes that separation opened early.  Also recalls L side hurts on the bottom and side of belly.  Worse with wearing of tight clothing.  Intermittent pain but frequent.  Household activity creates more discomfort, as does prolonged standing activity.  Some back pain noted but feels it may be related to abnormal curvature of spine.  Hasn't been to chiropractor over the last few months.  Also notes pelvic pressure with standing activity early in morning.  Pelvic pressure worse after intercourse.  Better after rest.  Hard to complete activity due to pain and pressure.  -SW    Subjective Pain    Able to rate subjective pain? yes  -SW    Pre-Treatment Pain Level 3  -SW    Post-Treatment Pain Level 3  -SW    Subjective Pain Comment mostly on L side of lower abdomen.  -SW    Posture/Observations    Posture- WNL Posture is WNL  -SW    Posture/Observations Comments Standing posture revealed shoulder and crest elevated on R side. Normal gait and t/fs.  Per MD consult, positive for pelvic varicosities though not confirmed via internal or vaginal exam this visit.  -SW    Quarter Clearing    Quarter Clearing Lower Quarter Clearing  -SW    DTR- Lower Quarter Clearing    Patellar tendon (L2-4) 2- Normal response  -SW    Achilles tendon (S1-2) 2- Normal response  -SW    Neural Tension Signs- Lower Quarter Clearing    Slump Bilateral:;Negative  -SW    SLR Bilateral:;Negative  -SW    Sensory Screen for Light Touch- Lower Quarter Clearing    L1 (inguinal area) Intact  -SW    L2 (anterior mid thigh) Intact  -SW    L3 (distal anterior thigh) Intact  -SW    L4 (medial lower leg/foot) Intact  -SW    L5 (lateral lower leg/great toe) Intact  -SW    S1 (bottom of foot) Intact  -SW    Myotomal Screen- Lower Quarter Clearing    Hip flexion (L2) WNL  -SW    Knee extension (L3) WNL  -SW    Ankle DF (L4) WNL  -SW    Great toe extension (L5) WNL  -SW    Ankle PF (S1) WNL  -SW    Knee flexion (S2) WNL  -SW     SI/Hip Screen- Lower Quarter Clearing    ASIS compression Left:;Positive  -SW    ASIS distraction Bilateral:;Negative  -SW    Kali's/Joe's test Bilateral:;Positive  -SW    Special Tests/Palpation    Special Tests/Palpation --   ASLR R=3, L=4 total 7  -SW    ROM (Range of Motion)    General ROM no range of motion deficits identified  -SW    MMT (Manual Muscle Testing)    General MMT Assessment no strength deficits identified  -SW      User Key  (r) = Recorded By, (t) = Taken By, (c) = Cosigned By    Initials Name Provider Type    SW Alison Prather, PT Physical Therapist                           PT Assessment/Plan       08/28/17 1000       PT Assessment    Functional Limitations Performance in leisure activities;Performance in self-care ADL;Other (comment)   caregiving  -SW     Impairments Impaired postural alignment;Pain;Poor body mechanics  -SW     Assessment Comments Patient is a 21 yo female with mechanical support problems in relation to round ligament and pelvic girdle.  She presents with asymmetry with pelvic girdle, R ant rotation.  She has poor core stabilization with dynamic movements as shown by pelvic rock and pain with ASLR bilaterally.  She has varicosities in PF that become irritated with prolonged standing posture.  She would benefit from skilled intervention to address current deficits and improve her continued desire for caregiving during progression of pregnancy.  -SW     Rehab Potential Good  -SW     Patient/caregiver participated in establishment of treatment plan and goals Yes  -SW     Patient would benefit from skilled therapy intervention Yes  -SW     PT Plan    PT Frequency 1x/week  -     Predicted Duration of Therapy Intervention (days/wks) 6-8 visits  -     PT Plan Comments Manual therapy for alignment, ther exercise for postural and core stab, body mechanics, support belts for Varicosities and round ligament as needed.  -SW       User Key  (r) = Recorded By, (t) = Taken By,  (c) = Cosigned By    Initials Name Provider Type    SANDRO Prather PT Physical Therapist                  Exercises       17 0900          Subjective Comments    Subjective Comments , 28 weeks gestation with  EDC 11/15/17.  Two other children at home are 3 and 4 years of age.  Notes that first pregnancy caused a diastasis or separation of abdominal wall.  Noted at end of first pregnancy.  Thought it somewhat closed with post partum but opened again with second pregnancy and worsening with this pregnancy.  Notes that separation opened early.  Also recalls L side hurts on the bottom and side of belly.  Worse with wearing of tight clothing.  Intermittent pain but frequent.  Household activity creates more discomfort, as does prolonged standing activity.  Some back pain noted but feels it may be related to abnormal curvature of spine.  Hasn't been to chiropractor over the last few months.  Also notes pelvic pressure with standing activity early in morning.  Pelvic pressure worse after intercourse.  Better after rest.  Hard to complete activity due to pain and pressure.  -SW      Subjective Pain    Able to rate subjective pain? yes  -SW      Pre-Treatment Pain Level 3  -SW      Post-Treatment Pain Level 3  -SW      Subjective Pain Comment mostly on L side of lower abdomen.  -SW      Exercise 1    Exercise Name 1 activation of TA for transfers and transition  -SW      Sets 1 2  -SW      Reps 1 8  -SW        User Key  (r) = Recorded By, (t) = Taken By, (c) = Cosigned By    Initials Name Provider Type    SANDRO Prather PT Physical Therapist                            PT OP Goals       17 1020 17 1000    PT Short Term Goals    STG Date to Achieve  17  -SW    STG 1  Patient able to complete activation of TA prior to transition and movements independently without cues required  -SW    STG 1 Progress  Not Met  -SW    STG 2  Patient to present with improved alignment to pelvic girdle x  3 consecutive visits  -    STG 2 Progress  Not Met  -    STG 3  Patient to be able to report improved tolerance to household activity and caregiving activities with mild discomfort with use of supportive bracing  -    STG 3 Progress  Not Met  -    Long Term Goals    LTG Date to Achieve  11/28/17  -    LTG 1  Mod Oswestry score of 18% or less  -    LTG 1 Progress  Not Met  -    LTG 2  Subjectively improve 75% better with ADL performance  -    LTG 2 Progress  Not Met  -    LTG 3  Be able to successfully support core postural muscles without limitations in daily activity or request for additional support for caregiving.  -    LTG 3 Progress  Not Met  -    Time Calculation    PT Goal Re-Cert Due Date 09/28/17  -SW 09/28/17  -      User Key  (r) = Recorded By, (t) = Taken By, (c) = Cosigned By    Initials Name Provider Type    SANDRO Prather, PT Physical Therapist                Therapy Education       08/28/17 1020          Therapy Education    Education Details activation of TA for tf and support; body mechanics and sleep posture to improve roung ligament irritation  -SW      Given Posture/body mechanics;HEP  -SW      Program New  -SW      How Provided Verbal;Demonstration  -SW      Provided to Patient  -SW      Level of Understanding Teach back education performed;Verbalized;Demonstrated  -        User Key  (r) = Recorded By, (t) = Taken By, (c) = Cosigned By    Initials Name Provider Type    SANDRO Prather, PT Physical Therapist                 Outcome Measures       08/28/17 0900          Modified Oswestry    Modified Oswestry Score/Comments 22/50=44%, CK  -SW      Functional Assessment    Outcome Measure Options Modifed Owestry  -        User Key  (r) = Recorded By, (t) = Taken By, (c) = Cosigned By    Initials Name Provider Type    SANDRO Prather, PT Physical Therapist            Time Calculation:   Start Time: 0916  Stop Time: 1013  Time Calculation (min): 57  min    Therapy Charges for Today     Code Description Service Date Service Provider Modifiers Qty    02756418476 HC PT THER SUPP EA 15 MIN 8/28/2017 Alison Prather, PT GP 1    60549227942 HC PT EVAL MOD COMPLEXITY 4 8/28/2017 Alison Prather, PT GP 1          PT G-Codes  Outcome Measure Options: Modifed Owestry       Alison Prather, PT  8/28/2017

## 2017-08-31 ENCOUNTER — ROUTINE PRENATAL (OUTPATIENT)
Dept: OBSTETRICS AND GYNECOLOGY | Facility: CLINIC | Age: 22
End: 2017-08-31

## 2017-08-31 VITALS — SYSTOLIC BLOOD PRESSURE: 122 MMHG | DIASTOLIC BLOOD PRESSURE: 77 MMHG | BODY MASS INDEX: 37.44 KG/M2 | WEIGHT: 208 LBS

## 2017-08-31 DIAGNOSIS — Z3A.29 29 WEEKS GESTATION OF PREGNANCY: ICD-10-CM

## 2017-08-31 DIAGNOSIS — B95.1 GROUP B STREPTOCOCCUS URINARY TRACT INFECTION AFFECTING PREGNANCY IN FIRST TRIMESTER: ICD-10-CM

## 2017-08-31 DIAGNOSIS — O23.41 GROUP B STREPTOCOCCUS URINARY TRACT INFECTION AFFECTING PREGNANCY IN FIRST TRIMESTER: ICD-10-CM

## 2017-08-31 DIAGNOSIS — O99.013 ANEMIA AFFECTING PREGNANCY IN THIRD TRIMESTER: Primary | ICD-10-CM

## 2017-08-31 PROCEDURE — 99212 OFFICE O/P EST SF 10 MIN: CPT | Performed by: ADVANCED PRACTICE MIDWIFE

## 2017-08-31 RX ORDER — FERROUS SULFATE TAB EC 324 MG (65 MG FE EQUIVALENT) 324 (65 FE) MG
324 TABLET DELAYED RESPONSE ORAL
Qty: 90 TABLET | Refills: 10 | Status: SHIPPED | OUTPATIENT
Start: 2017-08-31 | End: 2017-12-13

## 2017-09-14 ENCOUNTER — HOSPITAL ENCOUNTER (OUTPATIENT)
Dept: PHYSICAL THERAPY | Facility: HOSPITAL | Age: 22
Setting detail: THERAPIES SERIES
Discharge: HOME | End: 2017-09-14

## 2017-09-14 DIAGNOSIS — R10.2 PAIN OF ROUND LIGAMENT AFFECTING PREGNANCY, ANTEPARTUM: Primary | ICD-10-CM

## 2017-09-14 DIAGNOSIS — O26.899 PAIN OF ROUND LIGAMENT AFFECTING PREGNANCY, ANTEPARTUM: Primary | ICD-10-CM

## 2017-09-14 PROCEDURE — 97110 THERAPEUTIC EXERCISES: CPT | Performed by: PHYSICAL THERAPIST

## 2017-09-14 PROCEDURE — 97140 MANUAL THERAPY 1/> REGIONS: CPT | Performed by: PHYSICAL THERAPIST

## 2017-09-14 NOTE — THERAPY TREATMENT NOTE
Outpatient Physical Therapy Women's Health Treatment Note  [facility]     Patient Name: Tanja Brice  : 1995  MRN: [id number]  Today's Date: 2017        Visit Date: 2017   Visit Number:    Insurance: 8 visits (exp 10/30/17)  Recheck: 17  RTD: 2 weeks.    Subjective Improvement: 0%    Visit Dx:    ICD-10-CM ICD-9-CM   1. Pain of round ligament affecting pregnancy, antepartum O26.899 646.83    R10.2 625.9       Patient Active Problem List   Diagnosis   • Group B Streptococcus urinary tract infection affecting pregnancy in first trimester                PT Ortho       17 1300    Subjective Comments    Subjective Comments Symptoms have gotten a little worse.  Kitchen or hard chair difficult to sit in for any length of time.  Standing on R leg creates inc pain immediately upon standing.  Pain felt mostly where the pelvis connects.  Sometimes along back primarily when standing.  Pulling like sensation.  Using maxi-pad for pressure relief but notes more pressure throughout the day regardless of activity.  Able to tolerate intercourse at night time.  Hasn't tried it in the morning.  Contractions noted.  31 weeks.    -SW    Subjective Pain    Able to rate subjective pain? yes  -SW    Pre-Treatment Pain Level 3  -SW    Subjective Pain Comment Pain mostly on L side.  In back currently and radiates into L leg and thigh.  -SW    Posture/Observations    Posture- WNL Posture is WNL  -SW    Posture/Observations Comments Gait slow and guarded.  Equal step and stride bilaterally.  Initiation of gait is with hesitancy.  Ant rotation of innom on the L.  Sacral slight elevation on L side.  Paraspinal tension noted bilaterally.  -SW    Quarter Clearing    Quarter Clearing Lower Quarter Clearing  -SW    DTR- Lower Quarter Clearing    Patellar tendon (L2-4) 2- Normal response  -SW    Achilles tendon (S1-2) 2- Normal response  -SW    Neural Tension Signs- Lower Quarter Clearing    Slump  Bilateral:;Negative  -SW    SLR Bilateral:;Negative  -SW    Sensory Screen for Light Touch- Lower Quarter Clearing    L1 (inguinal area) Intact  -SW    L2 (anterior mid thigh) Intact  -SW    L3 (distal anterior thigh) Intact  -SW    L4 (medial lower leg/foot) Intact  -SW    L5 (lateral lower leg/great toe) Intact  -SW    S1 (bottom of foot) Intact  -SW    Myotomal Screen- Lower Quarter Clearing    Hip flexion (L2) WNL  -SW    Knee extension (L3) WNL  -SW    Ankle DF (L4) WNL  -SW    Great toe extension (L5) WNL  -SW    Ankle PF (S1) WNL  -SW    Knee flexion (S2) WNL  -SW    SI/Hip Screen- Lower Quarter Clearing    ASIS compression Left:;Positive  -SW    ASIS distraction Bilateral:;Negative  -SW    Kali's/Joe's test Bilateral:;Positive  -SW    Special Tests/Palpation    Special Tests/Palpation --   ASLR R=3, L=4 total 7  -SW    ROM (Range of Motion)    General ROM no range of motion deficits identified  -SW    MMT (Manual Muscle Testing)    General MMT Assessment no strength deficits identified  -SW      User Key  (r) = Recorded By, (t) = Taken By, (c) = Cosigned By    Initials Name Provider Type    SW Alison Prather, PT Physical Therapist                           PT Assessment/Plan       09/14/17 1300       PT Assessment    Functional Limitations Performance in leisure activities;Performance in self-care ADL;Other (comment)   caregiving  -SW     Impairments Impaired postural alignment;Pain;Poor body mechanics  -SW     Assessment Comments Patien tolerated treatment well with subjective reports of decreased pain.  Overall good tolerance to treatment.  Feel she would benefit from preg cradle, but size not available as this time.  Declines use of V2 support as her current regimen is doing well.  Understands HEP with good understanding and demonstration.  Alignment better after MET and manual techniques with pain reduction.  Gait more fluid post treatment.  -SW     Rehab Potential Good  -SW      Patient/caregiver participated in establishment of treatment plan and goals Yes  -SW     Patient would benefit from skilled therapy intervention Yes  -SW     PT Plan    PT Frequency 1x/week  -     Predicted Duration of Therapy Intervention (days/wks) 6-8 visits  -     PT Plan Comments Pregnancy cradle M size needed.  Awaiting availability.  Manual therapy as needed.  Advance stabilization and strengthening program while attempting to improve mobility with less pain.  -       User Key  (r) = Recorded By, (t) = Taken By, (c) = Cosigned By    Initials Name Provider Type    SANDRO Prather, PT Physical Therapist                      Exercises       09/14/17 1300          Subjective Comments    Subjective Comments Symptoms have gotten a little worse.  Kitchen or hard chair difficult to sit in for any length of time.  Standing on R leg creates inc pain immediately upon standing.  Pain felt mostly where the pelvis connects.  Sometimes along back primarily when standing.  Pulling like sensation.  Using maxi-pad for pressure relief but notes more pressure throughout the day regardless of activity.  Able to tolerate intercourse at night time.  Hasn't tried it in the morning.  Contractions noted.  31 weeks.    -      Subjective Pain    Able to rate subjective pain? yes  -SW      Pre-Treatment Pain Level 3  -SW      Subjective Pain Comment Pain mostly on L side.  In back currently and radiates into L leg and thigh.  -      Exercise 1    Exercise Name 1 Hamstring stretch bilaterally  -SW      Reps 1 3  -SW      Time (Seconds) 1 30  -SW      Additional Comments More nerve tension on L side.  -SW      Exercise 2    Exercise Name 2 Adductor stretch   -SW      Reps 2 3  -SW      Time (Seconds) 2 30  -SW      Exercise 3    Exercise Name 3 piriformis stretch bilaterally  -SW      Reps 3 3  -SW      Time (Seconds) 3 30  -SW      Additional Comments muscle stretch noted. Limited L>R with inability to lay leg onto table  for stretch   -SW      Exercise 4    Exercise Name 4 Prone pelvic brace  -SW      Time (Minutes) 4 4  -SW        User Key  (r) = Recorded By, (t) = Taken By, (c) = Cosigned By    Initials Name Provider Type    SANDRO Prather, PT Physical Therapist           Manual Rx (last 36 hours)      Manual Treatments       09/14/17 1700          Manual Rx 1    Manual Rx 1 Location Ant innominate rotation L  -SW      Manual Rx 1 Type MET  -SW      Manual Rx 1 Duration 4  -SW      Manual Rx 2    Manual Rx 2 Location Lumbosacral release  -SW      Manual Rx 2 Type MFR, cupping  -SW      Manual Rx 2 Duration 25  -SW      Manual Rx 3    Manual Rx 3 Location Piriformis release L  -SW      Manual Rx 3 Type trigger release  -SW      Manual Rx 3 Duration 4  -SW      Manual Rx 4    Manual Rx 4 Location QL release and stretching  -SW      Manual Rx 4 Type Manual trigger release  -SW      Manual Rx 4 Duration 6  -SW        User Key  (r) = Recorded By, (t) = Taken By, (c) = Cosigned By    Initials Name Provider Type    SANDRO Prather, PT Physical Therapist                          PT OP Goals       09/14/17 1717 09/14/17 1300    PT Short Term Goals    STG Date to Achieve  09/28/17  -    STG 1  Patient able to complete activation of TA prior to transition and movements independently without cues required  -    STG 1 Progress  Not Met  -    STG 2  Patient to present with improved alignment to pelvic girdle x 3 consecutive visits  -    STG 2 Progress  Not Met  -    STG 3  Patient to be able to report improved tolerance to household activity and caregiving activities with mild discomfort with use of supportive bracing  -    STG 3 Progress  Not Met  -    Long Term Goals    LTG Date to Achieve  11/28/17  -    LTG 1  Mod Oswestry score of 18% or less  -    LTG 1 Progress  Not Met  -    LTG 2  Subjectively improve 75% better with ADL performance  -    LTG 2 Progress  Not Met  -    LTG 3  Be able to successfully  support core postural muscles without limitations in daily activity or request for additional support for caregiving.  -    LTG 3 Progress  Not Met  -    Time Calculation    PT Goal Re-Cert Due Date 09/28/17  -SW 09/28/17  -SW      User Key  (r) = Recorded By, (t) = Taken By, (c) = Cosigned By    Initials Name Provider Type    SANDRO Prather, PT Physical Therapist                     Time Calculation:   Start Time: 1355  Stop Time: 1500  Time Calculation (min): 65 min    Therapy Charges for Today     Code Description Service Date Service Provider Modifiers Qty    32205431891  PT MANUAL THERAPY EA 15 MIN 9/14/2017 Alison Prather, PT GP 3    50115845097 HC PT THER PROC EA 15 MIN 9/14/2017 Alison Prather, PT GP 1                    Alison Prather, PT  9/14/2017

## 2017-09-19 ENCOUNTER — ROUTINE PRENATAL (OUTPATIENT)
Dept: OBSTETRICS AND GYNECOLOGY | Facility: CLINIC | Age: 22
End: 2017-09-19

## 2017-09-19 VITALS — BODY MASS INDEX: 37.98 KG/M2 | SYSTOLIC BLOOD PRESSURE: 130 MMHG | DIASTOLIC BLOOD PRESSURE: 70 MMHG | WEIGHT: 211 LBS

## 2017-09-19 DIAGNOSIS — B95.1: ICD-10-CM

## 2017-09-19 DIAGNOSIS — O99.210 OBESITY AFFECTING PREGNANCY: ICD-10-CM

## 2017-09-19 DIAGNOSIS — Z30.2 ENCOUNTER FOR STERILIZATION: ICD-10-CM

## 2017-09-19 DIAGNOSIS — O23.43: ICD-10-CM

## 2017-09-19 DIAGNOSIS — O99.013 ANEMIA DURING PREGNANCY IN THIRD TRIMESTER: Chronic | ICD-10-CM

## 2017-09-19 DIAGNOSIS — Z3A.31 31 WEEKS GESTATION OF PREGNANCY: Primary | ICD-10-CM

## 2017-09-19 PROBLEM — O23.40 GROUP B STREPTOCOCCUS URINARY TRACT INFECTION COMPLICATING PREGNANCY: Status: ACTIVE | Noted: 2017-05-02

## 2017-09-19 PROCEDURE — 99213 OFFICE O/P EST LOW 20 MIN: CPT | Performed by: OBSTETRICS & GYNECOLOGY

## 2017-09-19 NOTE — PROGRESS NOTES
Chief Complaint   Patient presents with   • OB Follow up   • High Risk Gestation     22-year-old  who desires permanent surgical sterilization.  After discussing the risk and benefits of this procedure and alternative procedures and treatments she sign the sterilization consent on 2017.    She is having a boy named Elgin.    The patient complains of the following: No complaints    ROS  Headache: No   Visual changes: No   Swelling in legs: No   Nausea: No   Constipation: No   Diarrhea: No   Contractions: No   Leaking fluid: No   Vaginal bleeding: No   Other:      Specific topics discussed at today's visit: Tubal sterilization and other forms of contraception  Tests done today: none  Tests to be done at the next visit: none    Tanja was seen today for ob follow up and high risk gestation.    Diagnoses and all orders for this visit:    31 weeks gestation of pregnancy    Encounter for sterilization    Group B streptococcus urinary tract infection complicating pregnancy, third trimester    Obesity affecting pregnancy    Anemia during pregnancy in third trimester

## 2017-09-21 ENCOUNTER — HOSPITAL ENCOUNTER (OUTPATIENT)
Dept: PHYSICAL THERAPY | Facility: HOSPITAL | Age: 22
Setting detail: THERAPIES SERIES
Discharge: HOME | End: 2017-09-21

## 2017-09-21 DIAGNOSIS — O26.899 PAIN OF ROUND LIGAMENT AFFECTING PREGNANCY, ANTEPARTUM: Primary | ICD-10-CM

## 2017-09-21 DIAGNOSIS — R10.2 PAIN OF ROUND LIGAMENT AFFECTING PREGNANCY, ANTEPARTUM: Primary | ICD-10-CM

## 2017-09-21 PROCEDURE — 97110 THERAPEUTIC EXERCISES: CPT | Performed by: PHYSICAL THERAPIST

## 2017-09-21 PROCEDURE — 97140 MANUAL THERAPY 1/> REGIONS: CPT | Performed by: PHYSICAL THERAPIST

## 2017-09-21 NOTE — THERAPY TREATMENT NOTE
Outpatient Physical Therapy Women's Health Treatment Note  [facility]     Patient Name: Tanja Brice  : 1995  MRN: [id number]  Today's Date: 2017        Visit Date: 2017  Visit Number: 3/3   Insurance: 8 visits (exp 10/30/17), 6 remain after today  Recheck: 17, Next visit  RTD: 2 weeks    Subjective Improvement: 50-60%    Visit Dx:    ICD-10-CM ICD-9-CM   1. Pain of round ligament affecting pregnancy, antepartum O26.899 646.83    R10.2 625.9       Patient Active Problem List   Diagnosis   • Group B streptococcus urinary tract infection complicating pregnancy   • Obesity affecting pregnancy   • Anemia during pregnancy in third trimester                PT Ortho       17 1400    Subjective Comments    Subjective Comments I have been doing better.  There is less pain.  Able to sit in hard chair x 1 hour now.  What little pain she has is more evened out.  More mobility noted and less pain in bending and manuevering around for the day.  32 weeks gestation.  Pain does not bother me when standing anymore.  Subjective Improvement: 50-60%.  -SW    Subjective Pain    Able to rate subjective pain? yes  -SW    Pre-Treatment Pain Level 3  -SW    Post-Treatment Pain Level 1  -SW    Posture/Observations    Posture- WNL Posture is WNL  -SW    Posture/Observations Comments Alignment of sacrum and pelvis is equal in supine and prone positions.  Mild extensor tension in LB.  Gait unremarkable.  Pt is able to isolate multifidus muscle in prone given proper verbal cues.    -SW    Quarter Clearing    Quarter Clearing Lower Quarter Clearing  -SW    DTR- Lower Quarter Clearing    Patellar tendon (L2-4) 2- Normal response  -SW    Achilles tendon (S1-2) 2- Normal response  -SW    Neural Tension Signs- Lower Quarter Clearing    Slump Bilateral:;Negative  -SW    SLR Bilateral:;Negative  -SW    Sensory Screen for Light Touch- Lower Quarter Clearing    L1 (inguinal area) Intact  -SW    L2 (anterior  mid thigh) Intact  -SW    L3 (distal anterior thigh) Intact  -SW    L4 (medial lower leg/foot) Intact  -SW    L5 (lateral lower leg/great toe) Intact  -SW    S1 (bottom of foot) Intact  -SW    Myotomal Screen- Lower Quarter Clearing    Hip flexion (L2) WNL  -SW    Knee extension (L3) WNL  -SW    Ankle DF (L4) WNL  -SW    Great toe extension (L5) WNL  -SW    Ankle PF (S1) WNL  -SW    Knee flexion (S2) WNL  -SW    SI/Hip Screen- Lower Quarter Clearing    ASIS compression Left:;Positive  -SW    ASIS distraction Bilateral:;Negative  -SW    Kali's/Joe's test Bilateral:;Positive  -SW    Special Tests/Palpation    Special Tests/Palpation --   ASLR R=3, L=4 total 7  -SW    ROM (Range of Motion)    General ROM no range of motion deficits identified  -SW    MMT (Manual Muscle Testing)    General MMT Assessment no strength deficits identified  -SW      User Key  (r) = Recorded By, (t) = Taken By, (c) = Cosigned By    Initials Name Provider Type    SW Alison Prather, PT Physical Therapist                           PT Assessment/Plan       09/21/17 1400       PT Assessment    Functional Limitations Performance in leisure activities;Performance in self-care ADL;Other (comment)   caregiving  -SW     Impairments Impaired postural alignment;Pain;Poor body mechanics  -     Assessment Comments Alignment good this date.  Slight inc tension in LB/sacral region.  Improved post treatment.  Did well with additions to exercise.  Needs reinforcemtn of PB concept.  STG 3 met this date with good progression toward other goal achievement.  -SW     Rehab Potential Good  -SW     Patient/caregiver participated in establishment of treatment plan and goals Yes  -SW     Patient would benefit from skilled therapy intervention Yes  -SW     PT Plan    PT Frequency 1x/week  -     Predicted Duration of Therapy Intervention (days/wks) 6-8 visits  -SW     PT Plan Comments Continue with advanced stability training.  PB seated concept and/or  semireclined next.  -SW       User Key  (r) = Recorded By, (t) = Taken By, (c) = Cosigned By    Initials Name Provider Type    SANDRO Prather PT Physical Therapist                      Exercises       09/21/17 1400          Subjective Comments    Subjective Comments I have been doing better.  There is less pain.  Able to sit in hard chair x 1 hour now.  What little pain she has is more evened out.  More mobility noted and less pain in bending and manuevering around for the day.  32 weeks gestation.  Pain does not bother me when standing anymore.  Subjective Improvement: 50-60%.  -SW      Subjective Pain    Able to rate subjective pain? yes  -SW      Pre-Treatment Pain Level 3  -SW      Post-Treatment Pain Level 1  -SW      Exercise 1    Exercise Name 1 Hamstring stretch bilaterally  -SW      Reps 1 2  -SW      Time (Seconds) 1 30  -SW      Exercise 2    Exercise Name 2 Adductor stretch   -SW      Reps 2 2  -SW      Time (Seconds) 2 30  -SW      Exercise 3    Exercise Name 3 piriformis stretch bilaterally  -SW      Reps 3 2  -SW      Time (Seconds) 3 30  -SW      Exercise 4    Exercise Name 4 prone TKE  -SW      Sets 4 2  -SW      Reps 4 10  -SW      Time (Minutes) 4 --  -SW      Exercise 5    Exercise Name 5 prone TKE with elbow raises  -SW      Sets 5 2  -SW      Reps 5 10  -SW      Exercise 6    Exercise Name 6 multifidus isolation  prone  -SW      Sets 6 2  -SW      Reps 6 8  -SW      Exercise 7    Exercise Name 7 PB for hip extension bilaterally  -SW      Sets 7 2  -SW      Reps 7 5  -SW        User Key  (r) = Recorded By, (t) = Taken By, (c) = Cosigned By    Initials Name Provider Type    SANDRO Prather PT Physical Therapist           Manual Rx (last 36 hours)      Manual Treatments       09/21/17 1500          Manual Rx 1    Manual Rx 1 Location Lumbosacral release  -SW      Manual Rx 1 Type MFR, cupping  -SW      Manual Rx 1 Duration 25  -SW        User Key  (r) = Recorded By, (t) = Taken By,  (c) = Cosigned By    Initials Name Provider Type    SANDRO Prather, PT Physical Therapist                          PT OP Goals       09/21/17 1645 09/21/17 1400    PT Short Term Goals    STG Date to Achieve  09/28/17  -    STG 1  Patient able to complete activation of TA prior to transition and movements independently without cues required  -    STG 1 Progress  Progressing  -    STG 2  Patient to present with improved alignment to pelvic girdle x 3 consecutive visits  -    STG 2 Progress  Progressing  -    STG 2 Progress Comments  Good this visit 9/21/17  -    STG 3  Patient to be able to report improved tolerance to household activity and caregiving activities with mild discomfort with use of supportive bracing  -    STG 3 Progress  Met  -    STG 3 Progress Comments  reports 50-60% improvement in tolerance  -    Long Term Goals    LTG Date to Achieve  11/28/17  -    LTG 1  Mod Oswestry score of 18% or less  -    LTG 1 Progress  Not Met  -    LTG 2  Subjectively improve 75% better with ADL performance  -    LTG 2 Progress  Progressing  -    LTG 3  Be able to successfully support core postural muscles without limitations in daily activity or request for additional support for caregiving.  -    LTG 3 Progress  Not Met  -    Time Calculation    PT Goal Re-Cert Due Date 09/28/17   next visit  - 09/28/17   Recheck Next visit  -      User Key  (r) = Recorded By, (t) = Taken By, (c) = Cosigned By    Initials Name Provider Type    SANDRO Prather PT Physical Therapist                     Time Calculation:   Start Time: 1417  Stop Time: 1522  Time Calculation (min): 65 min    Therapy Charges for Today     Code Description Service Date Service Provider Modifiers Qty    92028686905 HC PT MANUAL THERAPY EA 15 MIN 9/21/2017 Alison Prather, PT GP 2    09024483725 HC PT THER PROC EA 15 MIN 9/21/2017 Alison Prather, PT GP 2                    Alison Prather  PT  9/21/2017

## 2017-09-28 ENCOUNTER — APPOINTMENT (OUTPATIENT)
Dept: PHYSICAL THERAPY | Facility: HOSPITAL | Age: 22
End: 2017-09-28

## 2017-10-04 ENCOUNTER — ROUTINE PRENATAL (OUTPATIENT)
Dept: OBSTETRICS AND GYNECOLOGY | Facility: CLINIC | Age: 22
End: 2017-10-04

## 2017-10-04 VITALS — WEIGHT: 215 LBS | SYSTOLIC BLOOD PRESSURE: 116 MMHG | DIASTOLIC BLOOD PRESSURE: 73 MMHG | BODY MASS INDEX: 38.7 KG/M2

## 2017-10-04 DIAGNOSIS — Z3A.34 34 WEEKS GESTATION OF PREGNANCY: ICD-10-CM

## 2017-10-04 DIAGNOSIS — B95.1 GROUP B STREPTOCOCCUS URINARY TRACT INFECTION AFFECTING PREGNANCY IN THIRD TRIMESTER: ICD-10-CM

## 2017-10-04 DIAGNOSIS — O99.013 ANEMIA DURING PREGNANCY IN THIRD TRIMESTER: Primary | Chronic | ICD-10-CM

## 2017-10-04 DIAGNOSIS — O23.43 GROUP B STREPTOCOCCUS URINARY TRACT INFECTION AFFECTING PREGNANCY IN THIRD TRIMESTER: ICD-10-CM

## 2017-10-04 PROCEDURE — 99212 OFFICE O/P EST SF 10 MIN: CPT | Performed by: ADVANCED PRACTICE MIDWIFE

## 2017-10-04 PROCEDURE — 59025 FETAL NON-STRESS TEST: CPT | Performed by: ADVANCED PRACTICE MIDWIFE

## 2017-10-04 NOTE — PROGRESS NOTES
CC: ANDREWS visit, history reviewed see history tabs.     ROS:Positive sciatica nerve pain    Negative leaking fluid from the vagina, swelling in her legs, headache, visual changes, low back pain and heartburn    Objective: NST- reactive   Educated on:has PT appt tomorrow, GBS + in urine    A/Plan: f/u in 2 week/s   Tanja was seen today for routine prenatal visit.    Diagnoses and all orders for this visit:    Anemia during pregnancy in third trimester    Group B Streptococcus urinary tract infection affecting pregnancy in third trimester    34 weeks gestation of pregnancy

## 2017-10-05 ENCOUNTER — HOSPITAL ENCOUNTER (OUTPATIENT)
Dept: PHYSICAL THERAPY | Facility: HOSPITAL | Age: 22
Setting detail: THERAPIES SERIES
Discharge: HOME | End: 2017-10-05

## 2017-10-05 DIAGNOSIS — O26.899 PAIN OF ROUND LIGAMENT AFFECTING PREGNANCY, ANTEPARTUM: Primary | ICD-10-CM

## 2017-10-05 DIAGNOSIS — R10.2 PAIN OF ROUND LIGAMENT AFFECTING PREGNANCY, ANTEPARTUM: Primary | ICD-10-CM

## 2017-10-05 PROCEDURE — 97140 MANUAL THERAPY 1/> REGIONS: CPT | Performed by: PHYSICAL THERAPIST

## 2017-10-05 PROCEDURE — 97110 THERAPEUTIC EXERCISES: CPT | Performed by: PHYSICAL THERAPIST

## 2017-10-05 NOTE — THERAPY PROGRESS REPORT/RE-CERT
Outpatient Physical Therapy Women's Health Progress Note/Recheck  [facility]     Patient Name: Tanja Brice  : 1995  MRN: [id number]  Today's Date: 10/5/2017        Visit Date: 10/05/2017   Visit Number:    Insurance: 8 visits (exp 10/30/17)  Recheck: 17  RTD: 2 weeks.    Subjective Improvement: 60%    Visit Dx:    ICD-10-CM ICD-9-CM   1. Pain of round ligament affecting pregnancy, antepartum O26.899 646.83    R10.2 625.9       Patient Active Problem List   Diagnosis   • Group B streptococcus urinary tract infection complicating pregnancy   • Obesity affecting pregnancy   • Anemia during pregnancy in third trimester                PT Ortho       10/05/17 1300    Subjective Comments    Subjective Comments Was sick last week.  Better this week.  Hips have felt tightened up, unsure why.  34 weeks now.  Had him on monitor yesterday but good movement detected.  Round ligament feels good.  Feels more uncomfortable than pain today. EDC 11/15/17.  -SW    Subjective Pain    Able to rate subjective pain? yes  -SW    Pre-Treatment Pain Level 4  -SW    Post-Treatment Pain Level 2  -SW    Posture/Observations    Posture- WNL Posture is WNL  -SW    Posture/Observations Comments unremarkable gait and transfers.  Alignment ant rotated on R.  -SW    Quarter Clearing    Quarter Clearing Lower Quarter Clearing  -SW    DTR- Lower Quarter Clearing    Patellar tendon (L2-4) 2- Normal response  -SW    Achilles tendon (S1-2) 2- Normal response  -SW    Neural Tension Signs- Lower Quarter Clearing    Slump Bilateral:;Negative  -SW    SLR Bilateral:;Negative  -SW    Sensory Screen for Light Touch- Lower Quarter Clearing    L1 (inguinal area) Intact  -SW    L2 (anterior mid thigh) Intact  -SW    L3 (distal anterior thigh) Intact  -SW    L4 (medial lower leg/foot) Intact  -SW    L5 (lateral lower leg/great toe) Intact  -SW    S1 (bottom of foot) Intact  -SW    Myotomal Screen- Lower Quarter Clearing    Hip  flexion (L2) WNL  -SW    Knee extension (L3) WNL  -SW    Ankle DF (L4) WNL  -SW    Great toe extension (L5) WNL  -SW    Ankle PF (S1) WNL  -SW    Knee flexion (S2) WNL  -SW    SI/Hip Screen- Lower Quarter Clearing    ASIS compression --  -SW    ASIS distraction Bilateral:;Negative  -SW    Kali's/Joe's test --  -SW    Special Tests/Palpation    Special Tests/Palpation Lumbar/SI   ASLR R=3, L=4 total 7  -SW    Lumbosacral Accessory Motions    Lumbosacral Accessory Motions Tested? Yes  -SW    PA glide- Sacral base --   aligned  -SW    Innominate rotation --   ant right rotation of innom  -SW    ROM (Range of Motion)    General ROM no range of motion deficits identified  -SW    MMT (Manual Muscle Testing)    General MMT Assessment no strength deficits identified  -      User Key  (r) = Recorded By, (t) = Taken By, (c) = Cosigned By    Initials Name Provider Type    SANDRO Prather, PT Physical Therapist                           PT Assessment/Plan       10/05/17 1400       PT Assessment    Functional Limitations Performance in leisure activities;Performance in self-care ADL;Other (comment)   caregiving  -     Impairments Impaired postural alignment;Pain;Poor body mechanics  -     Assessment Comments Improved Mod Oswestry at today's visit to 20% disability index.  Subjectively able to do more with less pain, but remains with limitations in more physically demanding job activities.  Alignment continues to wax and wane with symmetry.  Good conceptual understanding of pelvic brace performance.  -SW     Rehab Potential Good  -     Patient/caregiver participated in establishment of treatment plan and goals Yes  -     Patient would benefit from skilled therapy intervention Yes  -SW     PT Plan    PT Frequency --   see in 2 weeks due to unsigned vacation  -     Predicted Duration of Therapy Intervention (days/wks) 6-8 visits  -     PT Plan Comments continue to advance seated stabilization: lat pull down  on ball; standing stab.  continue manual as needed.  -SW       User Key  (r) = Recorded By, (t) = Taken By, (c) = Cosigned By    Initials Name Provider Type    SANDRO Prather PT Physical Therapist                      Exercises       10/05/17 1400 10/05/17 1300       Subjective Comments    Subjective Comments  Was sick last week.  Better this week.  Hips have felt tightened up, unsure why.  34 weeks now.  Had him on monitor yesterday but good movement detected.  Round ligament feels good.  Feels more uncomfortable than pain today. EDC 11/15/17.  -SW     Subjective Pain    Able to rate subjective pain?  yes  -SW     Pre-Treatment Pain Level  4  -SW     Post-Treatment Pain Level  2  -SW     Exercise 1    Exercise Name 1 Pelvic neutral in seated position  -SW --  -SW     Reps 1  --  -SW     Time (Minutes) 1 3  -SW      Time (Seconds) 1  --  -SW     Exercise 2    Exercise Name 2 Pelvic neutral with pelvic brace co-contraction  -SW --  -SW     Sets 2 2  -SW      Reps 2 5  -SW --  -SW     Time (Seconds) 2  --  -SW     Exercise 3    Exercise Name 3 Pelvic neutral with PB and clocks GTB  -SW --  -SW     Sets 3 2  -SW      Reps 3 8  -SW --  -SW     Time (Seconds) 3  --  -SW     Exercise 4    Exercise Name 4 pelvic neutral with PB and LAQ bilaterally  -SW --  -SW     Sets 4 2  -SW --  -SW     Reps 4 10  -SW --  -SW     Exercise 5    Exercise Name 5 Pelvic neutral with PB and rows with GTB  -SW --  -SW     Sets 5 2  -SW --  -SW     Reps 5 10  -SW --  -SW     Exercise 6    Exercise Name 6 pelvic brace with neutral spine march LE bilaterally  -SW --  -SW     Sets 6 2  -SW --  -SW     Reps 6 8  -SW --  -SW     Exercise 7    Exercise Name 7 QL stretch sidelying R   -SW --  -SW     Sets 7  --  -SW     Reps 7 3  -SW --  -SW     Time (Seconds) 7 30  -SW        User Key  (r) = Recorded By, (t) = Taken By, (c) = Cosigned By    Initials Name Provider Type    SANDRO Prather, MELISA Physical Therapist           Manual Rx (last  36 hours)      Manual Treatments       10/05/17 1500          Manual Rx 1    Manual Rx 1 Location Ant rotated innom R  -SW      Manual Rx 1 Type MET  -SW      Manual Rx 1 Duration 4  -SW      Manual Rx 2    Manual Rx 2 Location QL R side  -SW      Manual Rx 2 Type MFR, strain/counterstrain, manual stretching  -SW      Manual Rx 2 Duration 12  -SW      Manual Rx 3    Manual Rx 3 Location Lumbosacral region with focus to sacrum and QL  -SW      Manual Rx 3 Type MFR, cupping  -SW      Manual Rx 3 Duration 10  -SW        User Key  (r) = Recorded By, (t) = Taken By, (c) = Cosigned By    Initials Name Provider Type    SW Alison Prather, PT Physical Therapist                          PT OP Goals       10/05/17 1516 10/05/17 1400    PT Short Term Goals    STG Date to Achieve  11/02/17  -    STG 1  Patient able to complete activation of TA prior to transition and movements independently without cues required  -    STG 1 Progress  Met  -    STG 2  Patient to present with improved alignment to pelvic girdle x 3 consecutive visits  -    STG 2 Progress  Progressing  -    STG 2 Progress Comments  presented today with contd asymmetry  -    STG 3  Patient to be able to report improved tolerance to household activity and caregiving activities with mild discomfort with use of supportive bracing  -    STG 3 Progress  Met  -    Long Term Goals    LTG Date to Achieve  11/28/17  -    LTG 1  Mod Oswestry score of 18% or less  -    LTG 1 Progress  Progressing  -    LTG 1 Progress Comments  20%, 10/50 at today's visit  -    LTG 2  Subjectively improve 75% better with ADL performance  -    LTG 2 Progress  Progressing  -    LTG 3  Be able to successfully support core postural muscles without limitations in daily activity or request for additional support for caregiving.  -    LTG 3 Progress  Not Met  -    Time Calculation    PT Goal Re-Cert Due Date 11/02/17  -SW 11/02/17  -      User Key  (r) = Recorded  By, (t) = Taken By, (c) = Cosigned By    Initials Name Provider Type    SANDRO Prather, PT Physical Therapist                 Therapy Education       10/05/17 1516          Therapy Education    Program Progressed  -SW      How Provided Verbal;Demonstration  -SW      Provided to Patient  -SW      Level of Understanding Demonstrated;Teach back education performed;Verbalized  -SW        User Key  (r) = Recorded By, (t) = Taken By, (c) = Cosigned By    Initials Name Provider Type    SANDRO Prather, PT Physical Therapist                Outcome Measures       10/05/17 1400          Modified Oswestry    Modified Oswestry Score/Comments 10/50=20%,   -SW      Functional Assessment    Outcome Measure Options Modifed Owestry  -SW        User Key  (r) = Recorded By, (t) = Taken By, (c) = Cosigned By    Initials Name Provider Type    SANDRO Prather, PT Physical Therapist            Time Calculation:   Start Time: 1401  Stop Time: 1507  Time Calculation (min): 66 min    Therapy Charges for Today     Code Description Service Date Service Provider Modifiers Qty    06332926504 HC PT MANUAL THERAPY EA 15 MIN 10/5/2017 Alison Prather, PT GP 2    26719212441 HC PT THER PROC EA 15 MIN 10/5/2017 Alison Prather, PT GP 2                    Alison Prather, PT  10/5/2017

## 2017-10-18 ENCOUNTER — ROUTINE PRENATAL (OUTPATIENT)
Dept: OBSTETRICS AND GYNECOLOGY | Facility: CLINIC | Age: 22
End: 2017-10-18

## 2017-10-18 VITALS — WEIGHT: 219 LBS | BODY MASS INDEX: 39.42 KG/M2 | DIASTOLIC BLOOD PRESSURE: 79 MMHG | SYSTOLIC BLOOD PRESSURE: 114 MMHG

## 2017-10-18 DIAGNOSIS — O23.43 GROUP B STREPTOCOCCUS URINARY TRACT INFECTION AFFECTING PREGNANCY IN THIRD TRIMESTER: ICD-10-CM

## 2017-10-18 DIAGNOSIS — O99.013 ANEMIA DURING PREGNANCY IN THIRD TRIMESTER: Primary | Chronic | ICD-10-CM

## 2017-10-18 DIAGNOSIS — B95.1 GROUP B STREPTOCOCCUS URINARY TRACT INFECTION AFFECTING PREGNANCY IN THIRD TRIMESTER: ICD-10-CM

## 2017-10-18 DIAGNOSIS — Z3A.36 36 WEEKS GESTATION OF PREGNANCY: ICD-10-CM

## 2017-10-18 PROCEDURE — 99212 OFFICE O/P EST SF 10 MIN: CPT | Performed by: ADVANCED PRACTICE MIDWIFE

## 2017-10-18 NOTE — PROGRESS NOTES
CC: ANDREWS visit, history reviewed see history tabs.     ROS: Negative leaking fluid from the vagina and swelling in her legs, HA, SOB,       Educated on:baby is breech- MD consultation next for options     A/Plan: f/u in 1 week/s   Tanja was seen today for routine prenatal visit.    Diagnoses and all orders for this visit:    Anemia during pregnancy in third trimester    Group B Streptococcus urinary tract infection affecting pregnancy in third trimester    36 weeks gestation of pregnancy

## 2017-10-19 ENCOUNTER — HOSPITAL ENCOUNTER (OUTPATIENT)
Dept: PHYSICAL THERAPY | Facility: HOSPITAL | Age: 22
Setting detail: THERAPIES SERIES
Discharge: HOME | End: 2017-10-19

## 2017-10-19 DIAGNOSIS — R10.2 PAIN OF ROUND LIGAMENT AFFECTING PREGNANCY, ANTEPARTUM: Primary | ICD-10-CM

## 2017-10-19 DIAGNOSIS — O26.899 PAIN OF ROUND LIGAMENT AFFECTING PREGNANCY, ANTEPARTUM: Primary | ICD-10-CM

## 2017-10-19 PROCEDURE — 97110 THERAPEUTIC EXERCISES: CPT | Performed by: PHYSICAL THERAPIST

## 2017-10-19 PROCEDURE — 97140 MANUAL THERAPY 1/> REGIONS: CPT | Performed by: PHYSICAL THERAPIST

## 2017-10-19 PROCEDURE — 97535 SELF CARE MNGMENT TRAINING: CPT | Performed by: PHYSICAL THERAPIST

## 2017-10-19 NOTE — THERAPY DISCHARGE NOTE
Outpatient Physical Therapy Women's Health Treatment Note/Discharge  [facility]     Patient Name: Tanja Brice  : 1995  MRN: [id number]  Today's Date: 10/19/2017        Visit Date: 10/19/2017   Visit Number: [id number]   Insurance: exp 10/30/17  DC date: 10/19/17  RTD: Monday for procedure     Subjective Improvement: 75%    Patient Active Problem List   Diagnosis   • Group B streptococcus urinary tract infection complicating pregnancy   • Obesity affecting pregnancy   • Anemia during pregnancy in third trimester        Past Medical History:   Diagnosis Date   • Anemia during pregnancy in third trimester 2017   • Anxiety    • Chest pain, unspecified    • Contact dermatitis due to poison ivy    • Exanthem due to herpes zoster    • H/O echocardiogram 12/15/2015    Normal LV function with Ef 60% without regional wall motion abnormalities. Normal Ev size with normal function. Normal diastolic function. No significant valvular regurgitation or stenosis   • Herpes zoster ophthalmicus     no ocular involvement      • History of chicken pox    • History of Holter monitoring 12/15/2015    Sinus mechanism with normal average heart rate with no evicence of chronotropic incompetence. Normal burden of ventricular and supraventricular ectopic event. Symptoms correlated only with sinus mechanism   • Migraine    • Obesity affecting pregnancy 2017   • Palpitations    • Shingles    • Temporomandibular joint disorder         Past Surgical History:   Procedure Laterality Date   • FOREIGN BODY REMOVAL Left 2017    splinter removed from left great toe. nerve block used.    • WISDOM TOOTH EXTRACTION           Visit Dx:    ICD-10-CM ICD-9-CM   1. Pain of round ligament affecting pregnancy, antepartum O26.899 646.83    R10.2 625.9                   PT Ortho       10/19/17 1400    Subjective Comments    Subjective Comments About 4 weeks ago felt wierd sensation.  Noted today that head positioned toward the  R lateral side and feet toward the bottom.  Dr. Oliver to attempt flip of fetus on Monday.  A little anxious about upcoming version of fetus.   -SW    Subjective Pain    Able to rate subjective pain? yes  -SW    Pre-Treatment Pain Level --   uncomfortable, no pain  -SW    Posture/Observations    Posture- WNL Posture is WNL  -SW    Posture/Observations Comments Aligned pelvic girdle bilaterally; prone sacral position aligned. gait normal as are transfers.  -SW    Quarter Clearing    Quarter Clearing Lower Quarter Clearing  -SW    DTR- Lower Quarter Clearing    Patellar tendon (L2-4) 2- Normal response  -SW    Achilles tendon (S1-2) 2- Normal response  -SW    Neural Tension Signs- Lower Quarter Clearing    Slump Bilateral:;Negative  -SW    SLR Bilateral:;Negative  -SW    Sensory Screen for Light Touch- Lower Quarter Clearing    L1 (inguinal area) Intact  -SW    L2 (anterior mid thigh) Intact  -SW    L3 (distal anterior thigh) Intact  -SW    L4 (medial lower leg/foot) Intact  -SW    L5 (lateral lower leg/great toe) Intact  -SW    S1 (bottom of foot) Intact  -SW    Myotomal Screen- Lower Quarter Clearing    Hip flexion (L2) WNL  -SW    Knee extension (L3) WNL  -SW    Ankle DF (L4) WNL  -SW    Great toe extension (L5) WNL  -SW    Ankle PF (S1) WNL  -SW    Knee flexion (S2) WNL  -SW    SI/Hip Screen- Lower Quarter Clearing    ASIS distraction Bilateral:;Negative  -SW    Special Tests/Palpation    Special Tests/Palpation Lumbar/SI   ASLR R=2, L=2 total 4  -SW    Lumbosacral Accessory Motions    Lumbosacral Accessory Motions Tested? Yes  -SW    PA glide- Sacral base --   aligned  -SW    Innominate rotation --   aligned  -SW    ROM (Range of Motion)    General ROM no range of motion deficits identified  -SW    MMT (Manual Muscle Testing)    General MMT Assessment no strength deficits identified  -SW      User Key  (r) = Recorded By, (t) = Taken By, (c) = Cosigned By    Initials Name Provider Type    SANDRO Prather  PT Physical Therapist                           PT Assessment/Plan       10/19/17 1400       PT Assessment    Functional Limitations Performance in leisure activities;Performance in self-care ADL;Other (comment)   caregiving  -SW     Impairments Impaired postural alignment;Pain;Poor body mechanics  -SW     Assessment Comments Patient is progressing well.  She has met all STG and LTG 2 and 3.  Mod oswestry 10/50=20% missing LTG 1, though it is improved from initial evaluation.  Patient D/C'd secondary to requiring inversion prodedure next week due to breech position of fetus.  Patient verbalizes understanding of HEP and feel she will do well with home management condition.  -SW     Rehab Potential Good  -SW     Patient/caregiver participated in establishment of treatment plan and goals Yes  -SW     Patient would benefit from skilled therapy intervention Yes  -SW     PT Plan    PT Frequency --   DC this date  -SW     PT Plan Comments DC patient this date.  Inversion procedure on Monday and expected delivery to follow.  Continue with HEP and consult as needed.  -SW       User Key  (r) = Recorded By, (t) = Taken By, (c) = Cosigned By    Initials Name Provider Type    SANDRO Prather, PT Physical Therapist                    Exercises       10/19/17 1400          Subjective Comments    Subjective Comments About 4 weeks ago felt wierd sensation.  Noted today that head positioned toward the R lateral side and feet toward the bottom.  Dr. Oliver to attempt flip of fetus on Monday.  A little anxious about upcoming version of fetus.   -SW      Subjective Pain    Able to rate subjective pain? yes  -SW      Pre-Treatment Pain Level --   uncomfortable, no pain  -SW      Exercise 1    Exercise Name 1 hamstring stretch bilaterally  -SW      Reps 1 3  -SW      Time (Seconds) 1 30  -SW      Exercise 2    Exercise Name 2 adductor stretch bilaterally  -SW      Reps 2 3  -SW      Time (Seconds) 2 30  -SW      Exercise 3     Exercise Name 3 piriformis stretch bilaterally  -      Reps 3 3  -SW      Time (Seconds) 3 30  -SW      Exercise 4    Exercise Name 4 Pelvic Brace review variable positions for   -SW      Time (Minutes) 4 4  -SW        User Key  (r) = Recorded By, (t) = Taken By, (c) = Cosigned By    Initials Name Provider Type    SW Alisno Prather, PT Physical Therapist           Manual Rx (last 36 hours)      Manual Treatments       10/19/17 1500          Manual Rx 1    Manual Rx 1 Location Lumbosacral release   -SW      Manual Rx 1 Type MFR and cupping  -SW      Manual Rx 1 Duration 15  -SW      Manual Rx 2    Manual Rx 2 Location QL R side release  -SW      Manual Rx 2 Type MFR, strain/counterstrain, manual stretching  -SW      Manual Rx 2 Duration 10  -SW        User Key  (r) = Recorded By, (t) = Taken By, (c) = Cosigned By    Initials Name Provider Type     Alison Prather, PT Physical Therapist                          PT OP Goals       10/19/17 1508 10/19/17 1400    PT Short Term Goals    STG Date to Achieve  11/02/17  -    STG 1  Patient able to complete activation of TA prior to transition and movements independently without cues required  -    STG 1 Progress  Met  -    STG 2  Patient to present with improved alignment to pelvic girdle x 3 consecutive visits  -    STG 2 Progress  Met  -    STG 3  Patient to be able to report improved tolerance to household activity and caregiving activities with mild discomfort with use of supportive bracing  -    STG 3 Progress  Met  -    Long Term Goals    LTG Date to Achieve  11/28/17  -    LTG 1  Mod Oswestry score of 18% or less  -    LTG 1 Progress  Progressing  -    LTG 1 Progress Comments  20% as of 10/5/17  -    LTG 2  Subjectively improve 75% better with ADL performance  -    LTG 2 Progress  Met  -    LTG 3  Be able to successfully support core postural muscles without limitations in daily activity or request for additional support  for caregiving.  -SW    LTG 3 Progress  Met  -SW    Time Calculation    PT Goal Re-Cert Due Date --   DC this date  -SW       User Key  (r) = Recorded By, (t) = Taken By, (c) = Cosigned By    Initials Name Provider Type    SANDRO Prather PT Physical Therapist                Therapy Education       10/19/17 1507          Therapy Education    Education Details body mechanics review for ; core PB for all positions to assist with protection to spine using core support.  -SW      Given HEP;Symptoms/condition management  -SW      Program Reinforced  -SW      How Provided Verbal;Demonstration  -SW      Provided to Patient  -SW      Level of Understanding Teach back education performed;Verbalized;Demonstrated  -SW        User Key  (r) = Recorded By, (t) = Taken By, (c) = Cosigned By    Initials Name Provider Type    SANDRO Prather PT Physical Therapist                   Time Calculation:   Start Time: 1405  Stop Time: 1506  Time Calculation (min): 61 min    Therapy Charges for Today     Code Description Service Date Service Provider Modifiers Qty    32825247888 HC PT MANUAL THERAPY EA 15 MIN 10/19/2017 Alison Prather, PT GP 2    52152476495 HC PT THER PROC EA 15 MIN 10/19/2017 Alison Prather, PT GP 1    42600284106 HC PT SELF CARE/MGMT/TRAIN EA 15 MIN 10/19/2017 Alison Prather, PT GP 1                OP PT Discharge Summary  Date of Discharge: 10/19/17  Reason for Discharge: All goals achieved  Outcomes Achieved: Able to achieve all goals within established timeline  Discharge Destination: Home with home program  Discharge Instructions: DC this date due to inversion expected Monday with delivery to follow.  Continue HEP and consult as needed.    Alison Prather PT  10/19/2017

## 2017-10-23 ENCOUNTER — HOSPITAL ENCOUNTER (OUTPATIENT)
Facility: HOSPITAL | Age: 22
Discharge: HOME | End: 2017-10-23
Attending: OBSTETRICS & GYNECOLOGY | Admitting: OBSTETRICS & GYNECOLOGY

## 2017-10-23 ENCOUNTER — PREP FOR SURGERY (OUTPATIENT)
Dept: OTHER | Facility: HOSPITAL | Age: 22
End: 2017-10-23

## 2017-10-23 ENCOUNTER — ROUTINE PRENATAL (OUTPATIENT)
Dept: OBSTETRICS AND GYNECOLOGY | Facility: CLINIC | Age: 22
End: 2017-10-23

## 2017-10-23 VITALS
RESPIRATION RATE: 20 BRPM | OXYGEN SATURATION: 100 % | SYSTOLIC BLOOD PRESSURE: 113 MMHG | TEMPERATURE: 98.9 F | HEART RATE: 89 BPM | DIASTOLIC BLOOD PRESSURE: 75 MMHG

## 2017-10-23 VITALS — DIASTOLIC BLOOD PRESSURE: 82 MMHG | SYSTOLIC BLOOD PRESSURE: 128 MMHG | WEIGHT: 220 LBS | BODY MASS INDEX: 39.6 KG/M2

## 2017-10-23 DIAGNOSIS — Z3A.36 36 WEEKS GESTATION OF PREGNANCY: ICD-10-CM

## 2017-10-23 PROCEDURE — 59025 FETAL NON-STRESS TEST: CPT

## 2017-10-23 PROCEDURE — G0463 HOSPITAL OUTPT CLINIC VISIT: HCPCS

## 2017-10-23 PROCEDURE — 99212 OFFICE O/P EST SF 10 MIN: CPT | Performed by: OBSTETRICS & GYNECOLOGY

## 2017-10-23 RX ORDER — LIDOCAINE HYDROCHLORIDE 10 MG/ML
5 INJECTION, SOLUTION INFILTRATION; PERINEURAL AS NEEDED
Status: CANCELLED | OUTPATIENT
Start: 2017-10-23

## 2017-10-23 RX ORDER — TERBUTALINE SULFATE 1 MG/ML
0.25 INJECTION, SOLUTION SUBCUTANEOUS ONCE AS NEEDED
Status: CANCELLED | OUTPATIENT
Start: 2017-10-23

## 2017-10-23 RX ORDER — SODIUM CHLORIDE 0.9 % (FLUSH) 0.9 %
1-10 SYRINGE (ML) INJECTION AS NEEDED
Status: CANCELLED | OUTPATIENT
Start: 2017-10-23

## 2017-10-23 RX ORDER — TERBUTALINE SULFATE 1 MG/ML
0.25 INJECTION, SOLUTION SUBCUTANEOUS ONCE
Status: CANCELLED | OUTPATIENT
Start: 2017-10-23 | End: 2017-10-23

## 2017-10-23 NOTE — PROGRESS NOTES
CC: ANDREWS visit    HPI:  +FM.  Having some BH, nothing regular or too painful.  No LOF or vb.  States she was told she was going to have a ECV today with induction following.  Very interested in ECV.     PE - see vitals    A/P: 23 yo  @ 36w5d presents for ANDREWS visit.   1. Continue routine prenatal care  - Encourage PNV  - PTL precautions  - Rh positive  - RTC in 1 week for ANDREWS visit    2. Breech presentation  - Discussed options including ECV vs primary  section.   - Discussed factors that improve success with ECV and discussed risk of ECV including fetal distress, PROM, abruption, and failure.  Discussed that I typically do my ECVs at 39 weeks so if successful can be followed by IOL vs a  section if unsuccessful.    - Patient states she is interested in speaking to other providers who may potentially do ECVs earlier.  Referred to Dr. Oliver.     Leonie FREY Friday

## 2017-10-23 NOTE — NON STRESS TEST
Tanja Brice, a  at 36w5d with an PETRA of 11/15/2017, by Last Menstrual Period, was seen at [facility] for a nonstress test.    Chief Complaint   Patient presents with   • Laboring     contractions       Interpretation A  Nonstress Test Interpretation A: Reactive (10/23/17 1256 : Silva Grajeda RN)  Comments A: Reviewed with SUZY Moore RN  (10/23/17 1256 : Silva Grajeda RN)

## 2017-10-24 ENCOUNTER — APPOINTMENT (OUTPATIENT)
Dept: PHYSICAL THERAPY | Facility: HOSPITAL | Age: 22
End: 2017-10-24

## 2017-10-24 ENCOUNTER — HOSPITAL ENCOUNTER (INPATIENT)
Dept: LABOR AND DELIVERY | Facility: HOSPITAL | Age: 22
End: 2017-10-24

## 2017-10-27 ENCOUNTER — PREP FOR SURGERY (OUTPATIENT)
Dept: OTHER | Facility: HOSPITAL | Age: 22
End: 2017-10-27

## 2017-10-27 ENCOUNTER — HOSPITAL ENCOUNTER (INPATIENT)
Dept: LABOR AND DELIVERY | Facility: HOSPITAL | Age: 22
Discharge: HOME | End: 2017-10-27

## 2017-10-27 ENCOUNTER — ANESTHESIA (OUTPATIENT)
Dept: LABOR AND DELIVERY | Facility: HOSPITAL | Age: 22
End: 2017-10-27

## 2017-10-27 ENCOUNTER — HOSPITAL ENCOUNTER (OUTPATIENT)
Facility: HOSPITAL | Age: 22
Discharge: HOME | End: 2017-10-27
Attending: OBSTETRICS & GYNECOLOGY | Admitting: OBSTETRICS & GYNECOLOGY

## 2017-10-27 ENCOUNTER — ANESTHESIA EVENT (OUTPATIENT)
Dept: LABOR AND DELIVERY | Facility: HOSPITAL | Age: 22
End: 2017-10-27

## 2017-10-27 VITALS
RESPIRATION RATE: 18 BRPM | TEMPERATURE: 98.4 F | HEIGHT: 63 IN | BODY MASS INDEX: 38.98 KG/M2 | OXYGEN SATURATION: 100 % | HEART RATE: 93 BPM | WEIGHT: 220 LBS | DIASTOLIC BLOOD PRESSURE: 70 MMHG | SYSTOLIC BLOOD PRESSURE: 122 MMHG

## 2017-10-27 LAB
ABO GROUP BLD: NORMAL
BLD GP AB SCN SERPL QL: NEGATIVE
DEPRECATED RDW RBC AUTO: 42.7 FL (ref 36.4–46.3)
ERYTHROCYTE [DISTWIDTH] IN BLOOD BY AUTOMATED COUNT: 15.2 % (ref 11.5–14.5)
HCT VFR BLD AUTO: 29.8 % (ref 35–45)
HGB BLD-MCNC: 9.4 G/DL (ref 12–15.5)
Lab: NORMAL
MCH RBC QN AUTO: 24.4 PG (ref 26.5–34)
MCHC RBC AUTO-ENTMCNC: 31.5 G/DL (ref 31.4–36)
MCV RBC AUTO: 77.2 FL (ref 80–98)
PLATELET # BLD AUTO: 233 10*3/MM3 (ref 150–450)
PMV BLD AUTO: 10.2 FL (ref 8–12)
RBC # BLD AUTO: 3.86 10*6/MM3 (ref 3.77–5.16)
RH BLD: POSITIVE
WBC NRBC COR # BLD: 8.25 10*3/MM3 (ref 3.2–9.8)

## 2017-10-27 PROCEDURE — 96372 THER/PROPH/DIAG INJ SC/IM: CPT

## 2017-10-27 PROCEDURE — 86850 RBC ANTIBODY SCREEN: CPT | Performed by: OBSTETRICS & GYNECOLOGY

## 2017-10-27 PROCEDURE — 59025 FETAL NON-STRESS TEST: CPT

## 2017-10-27 PROCEDURE — C1755 CATHETER, INTRASPINAL: HCPCS

## 2017-10-27 PROCEDURE — 59412 ANTEPARTUM MANIPULATION: CPT

## 2017-10-27 PROCEDURE — 86901 BLOOD TYPING SEROLOGIC RH(D): CPT | Performed by: OBSTETRICS & GYNECOLOGY

## 2017-10-27 PROCEDURE — 86900 BLOOD TYPING SEROLOGIC ABO: CPT | Performed by: OBSTETRICS & GYNECOLOGY

## 2017-10-27 PROCEDURE — C1755 CATHETER, INTRASPINAL: HCPCS | Performed by: NURSE ANESTHETIST, CERTIFIED REGISTERED

## 2017-10-27 PROCEDURE — 96360 HYDRATION IV INFUSION INIT: CPT

## 2017-10-27 PROCEDURE — G0463 HOSPITAL OUTPT CLINIC VISIT: HCPCS

## 2017-10-27 PROCEDURE — 96361 HYDRATE IV INFUSION ADD-ON: CPT

## 2017-10-27 PROCEDURE — 25010000002 TERBUTALINE PER 1 MG

## 2017-10-27 PROCEDURE — 85027 COMPLETE CBC AUTOMATED: CPT | Performed by: OBSTETRICS & GYNECOLOGY

## 2017-10-27 RX ORDER — METHYLERGONOVINE MALEATE 0.2 MG/ML
200 INJECTION INTRAVENOUS ONCE AS NEEDED
Status: CANCELLED | OUTPATIENT
Start: 2017-10-27

## 2017-10-27 RX ORDER — TERBUTALINE SULFATE 1 MG/ML
INJECTION, SOLUTION SUBCUTANEOUS
Status: COMPLETED
Start: 2017-10-27 | End: 2017-10-27

## 2017-10-27 RX ORDER — SODIUM CHLORIDE, SODIUM LACTATE, POTASSIUM CHLORIDE, CALCIUM CHLORIDE 600; 310; 30; 20 MG/100ML; MG/100ML; MG/100ML; MG/100ML
INJECTION, SOLUTION INTRAVENOUS
Status: DISCONTINUED
Start: 2017-10-27 | End: 2017-10-27 | Stop reason: HOSPADM

## 2017-10-27 RX ORDER — TERBUTALINE SULFATE 1 MG/ML
0.25 INJECTION, SOLUTION SUBCUTANEOUS ONCE
Status: COMPLETED | OUTPATIENT
Start: 2017-10-27 | End: 2017-10-27

## 2017-10-27 RX ORDER — LIDOCAINE HYDROCHLORIDE AND EPINEPHRINE 15; 5 MG/ML; UG/ML
INJECTION, SOLUTION EPIDURAL AS NEEDED
Status: DISCONTINUED | OUTPATIENT
Start: 2017-10-27 | End: 2017-10-27 | Stop reason: SURG

## 2017-10-27 RX ORDER — LIDOCAINE HYDROCHLORIDE 10 MG/ML
5 INJECTION, SOLUTION INFILTRATION; PERINEURAL AS NEEDED
Status: CANCELLED | OUTPATIENT
Start: 2017-10-27

## 2017-10-27 RX ORDER — SODIUM CHLORIDE 0.9 % (FLUSH) 0.9 %
SYRINGE (ML) INJECTION
Status: COMPLETED
Start: 2017-10-27 | End: 2017-10-27

## 2017-10-27 RX ORDER — SODIUM CHLORIDE, SODIUM LACTATE, POTASSIUM CHLORIDE, CALCIUM CHLORIDE 600; 310; 30; 20 MG/100ML; MG/100ML; MG/100ML; MG/100ML
125 INJECTION, SOLUTION INTRAVENOUS CONTINUOUS
Status: DISCONTINUED | OUTPATIENT
Start: 2017-10-27 | End: 2017-10-27 | Stop reason: HOSPADM

## 2017-10-27 RX ORDER — MISOPROSTOL 200 UG/1
800 TABLET ORAL AS NEEDED
Status: CANCELLED | OUTPATIENT
Start: 2017-10-27

## 2017-10-27 RX ORDER — LIDOCAINE HYDROCHLORIDE 20 MG/ML
INJECTION, SOLUTION INFILTRATION; PERINEURAL AS NEEDED
Status: DISCONTINUED | OUTPATIENT
Start: 2017-10-27 | End: 2017-10-27 | Stop reason: SURG

## 2017-10-27 RX ORDER — SODIUM CHLORIDE, SODIUM LACTATE, POTASSIUM CHLORIDE, CALCIUM CHLORIDE 600; 310; 30; 20 MG/100ML; MG/100ML; MG/100ML; MG/100ML
125 INJECTION, SOLUTION INTRAVENOUS CONTINUOUS
Status: CANCELLED | OUTPATIENT
Start: 2017-10-27

## 2017-10-27 RX ORDER — SODIUM CHLORIDE 0.9 % (FLUSH) 0.9 %
1-10 SYRINGE (ML) INJECTION AS NEEDED
Status: CANCELLED | OUTPATIENT
Start: 2017-10-27

## 2017-10-27 RX ORDER — SODIUM CHLORIDE, SODIUM LACTATE, POTASSIUM CHLORIDE, CALCIUM CHLORIDE 600; 310; 30; 20 MG/100ML; MG/100ML; MG/100ML; MG/100ML
999 INJECTION, SOLUTION INTRAVENOUS ONCE
Status: DISCONTINUED | OUTPATIENT
Start: 2017-10-27 | End: 2017-10-27 | Stop reason: HOSPADM

## 2017-10-27 RX ORDER — CARBOPROST TROMETHAMINE 250 UG/ML
250 INJECTION, SOLUTION INTRAMUSCULAR AS NEEDED
Status: CANCELLED | OUTPATIENT
Start: 2017-10-27

## 2017-10-27 RX ADMIN — SODIUM CHLORIDE, POTASSIUM CHLORIDE, SODIUM LACTATE AND CALCIUM CHLORIDE 125 ML/HR: 600; 310; 30; 20 INJECTION, SOLUTION INTRAVENOUS at 06:45

## 2017-10-27 RX ADMIN — TERBUTALINE SULFATE 0.25 MG: 1 INJECTION, SOLUTION SUBCUTANEOUS at 07:38

## 2017-10-27 RX ADMIN — SODIUM CHLORIDE, PRESERVATIVE FREE: 5 INJECTION INTRAVENOUS at 10:58

## 2017-10-27 RX ADMIN — LIDOCAINE HYDROCHLORIDE 10 ML: 20 INJECTION, SOLUTION INFILTRATION; PERINEURAL at 07:18

## 2017-10-27 RX ADMIN — LIDOCAINE HYDROCHLORIDE AND EPINEPHRINE 3 ML: 15; 5 INJECTION, SOLUTION EPIDURAL at 07:12

## 2017-10-27 RX ADMIN — TERBUTALINE SULFATE 0.25 MG: 1 INJECTION SUBCUTANEOUS at 07:38

## 2017-10-27 RX ADMIN — EPHEDRINE SULFATE 10 MG: 50 INJECTION INTRAMUSCULAR; INTRAVENOUS; SUBCUTANEOUS at 08:03

## 2017-10-27 NOTE — ANESTHESIA PREPROCEDURE EVALUATION
Anesthesia Evaluation     Patient summary reviewed and Nursing notes reviewed   NPO Solid Status: > 4 hours  NPO Liquid Status: > 2 hours     Airway   Mallampati: II  TM distance: >3 FB  Neck ROM: full  no difficulty expected  Dental - normal exam     Pulmonary - negative pulmonary ROS and normal exam   Cardiovascular     Rhythm: regular  Rate: normal    (+) dysrhythmias Tachycardia,     ROS comment: palpatations    Neuro/Psych  (+) headaches, psychiatric history Anxiety,    GI/Hepatic/Renal/Endo    (+)  GERD well controlled,     Musculoskeletal (-) negative ROS    Abdominal    Substance History - negative use     OB/GYN          Other - negative ROS                                     Anesthesia Plan    ASA 2     epidural     Anesthetic plan and risks discussed with patient.    Plan discussed with CRNA.

## 2017-10-27 NOTE — ANESTHESIA POSTPROCEDURE EVALUATION
Patient: Tanja Brice    Procedure Summary     Date Anesthesia Start Anesthesia Stop Room / Location    10/27/17 0650 0929        Procedure Diagnosis Scheduled Providers Provider    LABOR ANALGESIA No diagnosis on file.  Fredis Rondon CRNA          Anesthesia Type: epidural  Last vitals  BP   130/69 (10/27/17 0840)   Temp   98.4 °F (36.9 °C) (10/27/17 0515)   Pulse   88 (10/27/17 0840)   Resp   18 (10/27/17 0515)     SpO2   100 % (10/27/17 0725)     Post Anesthesia Care and Evaluation    Patient location during evaluation: bedside  Patient participation: complete - patient participated  Level of consciousness: awake and alert  Pain score: 0  Pain management: adequate  Airway patency: patent  Anesthetic complications: No anesthetic complications  PONV Status: none  Cardiovascular status: acceptable  Respiratory status: acceptable  Hydration status: acceptable  Post Neuraxial Block status: Motor and sensory function returned to baseline and No signs or symptoms of PDPH

## 2017-10-27 NOTE — ANESTHESIA PROCEDURE NOTES
Epidural Block    Patient location during procedure: floor  Indication:at surgeon's request  Performed By  CRNA: ESTEFANY MCCRAY  Preanesthetic Checklist  Completed: patient identified, site marked, surgical consent, pre-op evaluation, timeout performed, IV checked, risks and benefits discussed and monitors and equipment checked  Prep:  Pt Position:sitting  Sterile Tech:cap, gloves, mask and sterile barrier  Prep:chlorhexidine gluconate and isopropyl alcohol  Monitoring:blood pressure monitoring, continuous pulse oximetry and EKG  Epidural Block Procedure:  Approach:midline  Guidance:landmark technique  Location:lumbar  Level:3-4  Needle Type:Tuohy  Needle Gauge:19 G  Loss of Resistance Medium: saline  Loss of Resistance: 7cm  Cath Depth at skin:12 cm  Paresthesia: none  Aspiration:negative  Test Dose:negative  Post Assessment:  Dressing:occlusive dressing applied and secured with tape  Pt Tolerance:patient tolerated the procedure well with no apparent complications and no signs or symtoms of SAB or intravascular injection  Complications:no

## 2017-10-30 ENCOUNTER — APPOINTMENT (OUTPATIENT)
Dept: PHYSICAL THERAPY | Facility: HOSPITAL | Age: 22
End: 2017-10-30

## 2017-10-31 ENCOUNTER — ROUTINE PRENATAL (OUTPATIENT)
Dept: OBSTETRICS AND GYNECOLOGY | Facility: CLINIC | Age: 22
End: 2017-10-31

## 2017-10-31 VITALS — BODY MASS INDEX: 38.79 KG/M2 | SYSTOLIC BLOOD PRESSURE: 122 MMHG | DIASTOLIC BLOOD PRESSURE: 76 MMHG | WEIGHT: 219 LBS

## 2017-10-31 DIAGNOSIS — O99.213 OBESITY AFFECTING PREGNANCY IN THIRD TRIMESTER: Primary | ICD-10-CM

## 2017-10-31 DIAGNOSIS — Z3A.37 37 WEEKS GESTATION OF PREGNANCY: ICD-10-CM

## 2017-10-31 PROCEDURE — 99212 OFFICE O/P EST SF 10 MIN: CPT | Performed by: OBSTETRICS & GYNECOLOGY

## 2017-10-31 NOTE — PROGRESS NOTES
Occ ctxs, feeling well  No complaints  A/P 21 yo  at 37+6 for prenatal visit  F/u next wk for 1LTCS  Breech presentation s/p failed version

## 2017-11-08 ENCOUNTER — ANESTHESIA (OUTPATIENT)
Dept: LABOR AND DELIVERY | Facility: HOSPITAL | Age: 22
End: 2017-11-08

## 2017-11-08 ENCOUNTER — ANESTHESIA EVENT (OUTPATIENT)
Dept: LABOR AND DELIVERY | Facility: HOSPITAL | Age: 22
End: 2017-11-08

## 2017-11-08 ENCOUNTER — HOSPITAL ENCOUNTER (INPATIENT)
Facility: HOSPITAL | Age: 22
LOS: 3 days | Discharge: HOME | End: 2017-11-11
Attending: OBSTETRICS & GYNECOLOGY | Admitting: OBSTETRICS & GYNECOLOGY

## 2017-11-08 DIAGNOSIS — Z98.891 STATUS POST CESAREAN SECTION: ICD-10-CM

## 2017-11-08 PROBLEM — B95.1 GROUP B STREPTOCOCCUS URINARY TRACT INFECTION COMPLICATING PREGNANCY: Status: RESOLVED | Noted: 2017-05-02 | Resolved: 2017-11-08

## 2017-11-08 PROBLEM — O99.013 ANEMIA DURING PREGNANCY IN THIRD TRIMESTER: Chronic | Status: RESOLVED | Noted: 2017-09-19 | Resolved: 2017-11-08

## 2017-11-08 PROBLEM — O99.210 OBESITY AFFECTING PREGNANCY: Status: RESOLVED | Noted: 2017-09-19 | Resolved: 2017-11-08

## 2017-11-08 PROBLEM — O23.40 GROUP B STREPTOCOCCUS URINARY TRACT INFECTION COMPLICATING PREGNANCY: Status: RESOLVED | Noted: 2017-05-02 | Resolved: 2017-11-08

## 2017-11-08 LAB
ABO GROUP BLD: NORMAL
AMPHET+METHAMPHET UR QL: NEGATIVE
BARBITURATES UR QL SCN: NEGATIVE
BENZODIAZ UR QL SCN: NEGATIVE
BLD GP AB SCN SERPL QL: NEGATIVE
CANNABINOIDS SERPL QL: NEGATIVE
COCAINE UR QL: NEGATIVE
DEPRECATED RDW RBC AUTO: 42.3 FL (ref 36.4–46.3)
ERYTHROCYTE [DISTWIDTH] IN BLOOD BY AUTOMATED COUNT: 15.2 % (ref 11.5–14.5)
HCT VFR BLD AUTO: 30.5 % (ref 35–45)
HGB BLD-MCNC: 9.5 G/DL (ref 12–15.5)
HOLD SPECIMEN: NORMAL
Lab: NORMAL
MCH RBC QN AUTO: 23.6 PG (ref 26.5–34)
MCHC RBC AUTO-ENTMCNC: 31.1 G/DL (ref 31.4–36)
MCV RBC AUTO: 75.7 FL (ref 80–98)
METHADONE UR QL SCN: NEGATIVE
OPIATES UR QL: NEGATIVE
OXYCODONE UR QL SCN: NEGATIVE
PLATELET # BLD AUTO: 216 10*3/MM3 (ref 150–450)
PMV BLD AUTO: 9.3 FL (ref 8–12)
RBC # BLD AUTO: 4.03 10*6/MM3 (ref 3.77–5.16)
RH BLD: POSITIVE
WBC NRBC COR # BLD: 6.3 10*3/MM3 (ref 3.2–9.8)

## 2017-11-08 PROCEDURE — 80307 DRUG TEST PRSMV CHEM ANLYZR: CPT | Performed by: OBSTETRICS & GYNECOLOGY

## 2017-11-08 PROCEDURE — 86923 COMPATIBILITY TEST ELECTRIC: CPT

## 2017-11-08 PROCEDURE — 85027 COMPLETE CBC AUTOMATED: CPT | Performed by: OBSTETRICS & GYNECOLOGY

## 2017-11-08 PROCEDURE — 86850 RBC ANTIBODY SCREEN: CPT | Performed by: OBSTETRICS & GYNECOLOGY

## 2017-11-08 PROCEDURE — 51703 INSERT BLADDER CATH COMPLEX: CPT

## 2017-11-08 PROCEDURE — 25010000002 ONDANSETRON PER 1 MG: Performed by: NURSE ANESTHETIST, CERTIFIED REGISTERED

## 2017-11-08 PROCEDURE — 59514 CESAREAN DELIVERY ONLY: CPT | Performed by: OBSTETRICS & GYNECOLOGY

## 2017-11-08 PROCEDURE — 0UL70ZZ OCCLUSION OF BILATERAL FALLOPIAN TUBES, OPEN APPROACH: ICD-10-PCS | Performed by: OBSTETRICS & GYNECOLOGY

## 2017-11-08 PROCEDURE — 86900 BLOOD TYPING SEROLOGIC ABO: CPT | Performed by: OBSTETRICS & GYNECOLOGY

## 2017-11-08 PROCEDURE — 58611 LIGATE OVIDUCT(S) ADD-ON: CPT | Performed by: OBSTETRICS & GYNECOLOGY

## 2017-11-08 PROCEDURE — 88302 TISSUE EXAM BY PATHOLOGIST: CPT | Performed by: OBSTETRICS & GYNECOLOGY

## 2017-11-08 PROCEDURE — 25010000002 PHENYLEPHRINE PER 1 ML: Performed by: NURSE ANESTHETIST, CERTIFIED REGISTERED

## 2017-11-08 PROCEDURE — 88302 TISSUE EXAM BY PATHOLOGIST: CPT | Performed by: PATHOLOGY

## 2017-11-08 PROCEDURE — 94799 UNLISTED PULMONARY SVC/PX: CPT

## 2017-11-08 PROCEDURE — 86901 BLOOD TYPING SEROLOGIC RH(D): CPT | Performed by: OBSTETRICS & GYNECOLOGY

## 2017-11-08 PROCEDURE — 25010000002 MORPHINE PER 10 MG

## 2017-11-08 PROCEDURE — 25010000002 FENTANYL CITRATE (PF) 100 MCG/2ML SOLUTION: Performed by: NURSE ANESTHETIST, CERTIFIED REGISTERED

## 2017-11-08 PROCEDURE — 25010000003 CEFAZOLIN PER 500 MG: Performed by: NURSE ANESTHETIST, CERTIFIED REGISTERED

## 2017-11-08 RX ORDER — FENTANYL CITRATE 50 UG/ML
INJECTION, SOLUTION INTRAMUSCULAR; INTRAVENOUS AS NEEDED
Status: DISCONTINUED | OUTPATIENT
Start: 2017-11-08 | End: 2017-11-08 | Stop reason: SURG

## 2017-11-08 RX ORDER — LIDOCAINE HYDROCHLORIDE 10 MG/ML
5 INJECTION, SOLUTION INFILTRATION; PERINEURAL AS NEEDED
Status: DISCONTINUED | OUTPATIENT
Start: 2017-11-08 | End: 2017-11-08 | Stop reason: HOSPADM

## 2017-11-08 RX ORDER — OXYCODONE HYDROCHLORIDE AND ACETAMINOPHEN 5; 325 MG/1; MG/1
1 TABLET ORAL EVERY 4 HOURS PRN
Status: DISCONTINUED | OUTPATIENT
Start: 2017-11-08 | End: 2017-11-11 | Stop reason: HOSPADM

## 2017-11-08 RX ORDER — SODIUM CHLORIDE, SODIUM LACTATE, POTASSIUM CHLORIDE, CALCIUM CHLORIDE 600; 310; 30; 20 MG/100ML; MG/100ML; MG/100ML; MG/100ML
125 INJECTION, SOLUTION INTRAVENOUS CONTINUOUS
Status: DISCONTINUED | OUTPATIENT
Start: 2017-11-08 | End: 2017-11-08

## 2017-11-08 RX ORDER — EPHEDRINE SULFATE 50 MG/ML
INJECTION, SOLUTION INTRAVENOUS AS NEEDED
Status: DISCONTINUED | OUTPATIENT
Start: 2017-11-08 | End: 2017-11-08 | Stop reason: SURG

## 2017-11-08 RX ORDER — ONDANSETRON 4 MG/1
4 TABLET, FILM COATED ORAL EVERY 8 HOURS PRN
Status: DISCONTINUED | OUTPATIENT
Start: 2017-11-08 | End: 2017-11-11 | Stop reason: HOSPADM

## 2017-11-08 RX ORDER — IBUPROFEN 800 MG/1
800 TABLET ORAL EVERY 8 HOURS SCHEDULED
Status: DISCONTINUED | OUTPATIENT
Start: 2017-11-08 | End: 2017-11-11 | Stop reason: HOSPADM

## 2017-11-08 RX ORDER — CARBOPROST TROMETHAMINE 250 UG/ML
250 INJECTION, SOLUTION INTRAMUSCULAR AS NEEDED
Status: DISCONTINUED | OUTPATIENT
Start: 2017-11-08 | End: 2017-11-08 | Stop reason: HOSPADM

## 2017-11-08 RX ORDER — SODIUM CHLORIDE 0.9 % (FLUSH) 0.9 %
1-10 SYRINGE (ML) INJECTION AS NEEDED
Status: DISCONTINUED | OUTPATIENT
Start: 2017-11-08 | End: 2017-11-08 | Stop reason: HOSPADM

## 2017-11-08 RX ORDER — BISACODYL 10 MG
10 SUPPOSITORY, RECTAL RECTAL DAILY PRN
Status: DISCONTINUED | OUTPATIENT
Start: 2017-11-08 | End: 2017-11-11 | Stop reason: HOSPADM

## 2017-11-08 RX ORDER — PRENATAL VIT/IRON FUM/FOLIC AC 27MG-0.8MG
1 TABLET ORAL DAILY
Status: DISCONTINUED | OUTPATIENT
Start: 2017-11-08 | End: 2017-11-11 | Stop reason: HOSPADM

## 2017-11-08 RX ORDER — DIPHENHYDRAMINE HCL 25 MG
25 CAPSULE ORAL EVERY 4 HOURS PRN
Status: DISCONTINUED | OUTPATIENT
Start: 2017-11-08 | End: 2017-11-11 | Stop reason: HOSPADM

## 2017-11-08 RX ORDER — OXYTOCIN/RINGER'S LACTATE 20/1000 ML
PLASTIC BAG, INJECTION (ML) INTRAVENOUS
Status: DISPENSED
Start: 2017-11-08 | End: 2017-11-08

## 2017-11-08 RX ORDER — MISOPROSTOL 200 UG/1
800 TABLET ORAL AS NEEDED
Status: DISCONTINUED | OUTPATIENT
Start: 2017-11-08 | End: 2017-11-08 | Stop reason: HOSPADM

## 2017-11-08 RX ORDER — MORPHINE SULFATE 1 MG/ML
INJECTION INTRAVENOUS CONTINUOUS
Status: DISCONTINUED | OUTPATIENT
Start: 2017-11-08 | End: 2017-11-11 | Stop reason: HOSPADM

## 2017-11-08 RX ORDER — OXYCODONE HYDROCHLORIDE AND ACETAMINOPHEN 5; 325 MG/1; MG/1
2 TABLET ORAL EVERY 4 HOURS PRN
Status: DISCONTINUED | OUTPATIENT
Start: 2017-11-08 | End: 2017-11-11 | Stop reason: HOSPADM

## 2017-11-08 RX ORDER — ONDANSETRON 2 MG/ML
4 INJECTION INTRAMUSCULAR; INTRAVENOUS ONCE AS NEEDED
Status: ACTIVE | OUTPATIENT
Start: 2017-11-08 | End: 2017-11-09

## 2017-11-08 RX ORDER — OXYTOCIN/RINGER'S LACTATE 20/1000 ML
PLASTIC BAG, INJECTION (ML) INTRAVENOUS
Status: COMPLETED
Start: 2017-11-08 | End: 2017-11-08

## 2017-11-08 RX ORDER — METHYLERGONOVINE MALEATE 0.2 MG/ML
200 INJECTION INTRAVENOUS ONCE AS NEEDED
Status: DISCONTINUED | OUTPATIENT
Start: 2017-11-08 | End: 2017-11-08 | Stop reason: HOSPADM

## 2017-11-08 RX ORDER — SODIUM CHLORIDE, SODIUM LACTATE, POTASSIUM CHLORIDE, CALCIUM CHLORIDE 600; 310; 30; 20 MG/100ML; MG/100ML; MG/100ML; MG/100ML
INJECTION, SOLUTION INTRAVENOUS
Status: COMPLETED
Start: 2017-11-08 | End: 2017-11-08

## 2017-11-08 RX ORDER — NALOXONE HCL 0.4 MG/ML
0.2 VIAL (ML) INJECTION
Status: DISCONTINUED | OUTPATIENT
Start: 2017-11-08 | End: 2017-11-11 | Stop reason: HOSPADM

## 2017-11-08 RX ORDER — CEFAZOLIN SODIUM 1 G/3ML
INJECTION, POWDER, FOR SOLUTION INTRAMUSCULAR; INTRAVENOUS AS NEEDED
Status: DISCONTINUED | OUTPATIENT
Start: 2017-11-08 | End: 2017-11-08 | Stop reason: SURG

## 2017-11-08 RX ORDER — DIPHENHYDRAMINE HYDROCHLORIDE 50 MG/ML
25 INJECTION INTRAMUSCULAR; INTRAVENOUS EVERY 4 HOURS PRN
Status: DISCONTINUED | OUTPATIENT
Start: 2017-11-08 | End: 2017-11-11 | Stop reason: HOSPADM

## 2017-11-08 RX ORDER — SIMETHICONE 80 MG
80 TABLET,CHEWABLE ORAL 4 TIMES DAILY PRN
Status: DISCONTINUED | OUTPATIENT
Start: 2017-11-08 | End: 2017-11-11 | Stop reason: HOSPADM

## 2017-11-08 RX ORDER — OXYTOCIN/RINGER'S LACTATE 20/1000 ML
PLASTIC BAG, INJECTION (ML) INTRAVENOUS AS NEEDED
Status: DISCONTINUED | OUTPATIENT
Start: 2017-11-08 | End: 2017-11-08 | Stop reason: SURG

## 2017-11-08 RX ORDER — PROMETHAZINE HYDROCHLORIDE 12.5 MG/1
12.5 TABLET ORAL EVERY 4 HOURS PRN
Status: DISCONTINUED | OUTPATIENT
Start: 2017-11-08 | End: 2017-11-11 | Stop reason: HOSPADM

## 2017-11-08 RX ORDER — DOCUSATE SODIUM 100 MG/1
100 CAPSULE, LIQUID FILLED ORAL 2 TIMES DAILY PRN
Status: DISCONTINUED | OUTPATIENT
Start: 2017-11-08 | End: 2017-11-11 | Stop reason: HOSPADM

## 2017-11-08 RX ORDER — TRISODIUM CITRATE DIHYDRATE AND CITRIC ACID MONOHYDRATE 500; 334 MG/5ML; MG/5ML
30 SOLUTION ORAL ONCE
Status: COMPLETED | OUTPATIENT
Start: 2017-11-08 | End: 2017-11-08

## 2017-11-08 RX ORDER — MORPHINE SULFATE/0.9% NACL/PF 1 MG/ML
SYRINGE (ML) INJECTION
Status: COMPLETED
Start: 2017-11-08 | End: 2017-11-08

## 2017-11-08 RX ORDER — ONDANSETRON 2 MG/ML
INJECTION INTRAMUSCULAR; INTRAVENOUS AS NEEDED
Status: DISCONTINUED | OUTPATIENT
Start: 2017-11-08 | End: 2017-11-08 | Stop reason: SURG

## 2017-11-08 RX ORDER — NALOXONE HCL 0.4 MG/ML
0.1 VIAL (ML) INJECTION
Status: DISCONTINUED | OUTPATIENT
Start: 2017-11-08 | End: 2017-11-11 | Stop reason: HOSPADM

## 2017-11-08 RX ORDER — BUPIVACAINE HYDROCHLORIDE 7.5 MG/ML
INJECTION, SOLUTION EPIDURAL; RETROBULBAR AS NEEDED
Status: DISCONTINUED | OUTPATIENT
Start: 2017-11-08 | End: 2017-11-08 | Stop reason: SURG

## 2017-11-08 RX ORDER — FERROUS SULFATE TAB EC 324 MG (65 MG FE EQUIVALENT) 324 (65 FE) MG
324 TABLET DELAYED RESPONSE ORAL
Status: DISCONTINUED | OUTPATIENT
Start: 2017-11-08 | End: 2017-11-11 | Stop reason: HOSPADM

## 2017-11-08 RX ADMIN — EPHEDRINE SULFATE 10 MG: 50 INJECTION INTRAMUSCULAR; INTRAVENOUS; SUBCUTANEOUS at 07:59

## 2017-11-08 RX ADMIN — FERROUS SULFATE TAB EC 324 MG (65 MG FE EQUIVALENT) 324 MG: 324 (65 FE) TABLET DELAYED RESPONSE at 13:03

## 2017-11-08 RX ADMIN — PHENYLEPHRINE HYDROCHLORIDE 200 MCG: 10 INJECTION INTRAVENOUS at 07:27

## 2017-11-08 RX ADMIN — Medication 200 ML: at 08:15

## 2017-11-08 RX ADMIN — FENTANYL CITRATE 10 MCG: 50 INJECTION, SOLUTION INTRAMUSCULAR; INTRAVENOUS at 07:20

## 2017-11-08 RX ADMIN — EPHEDRINE SULFATE 5 MG: 50 INJECTION INTRAMUSCULAR; INTRAVENOUS; SUBCUTANEOUS at 07:44

## 2017-11-08 RX ADMIN — PRENATAL VIT W/ FE FUMARATE-FA TAB 27-0.8 MG 1 TABLET: 27-0.8 TAB at 13:03

## 2017-11-08 RX ADMIN — Medication: at 09:08

## 2017-11-08 RX ADMIN — EPHEDRINE SULFATE 5 MG: 50 INJECTION INTRAMUSCULAR; INTRAVENOUS; SUBCUTANEOUS at 07:54

## 2017-11-08 RX ADMIN — Medication 100 ML: at 08:05

## 2017-11-08 RX ADMIN — SODIUM CHLORIDE, POTASSIUM CHLORIDE, SODIUM LACTATE AND CALCIUM CHLORIDE 1000 ML: 600; 310; 30; 20 INJECTION, SOLUTION INTRAVENOUS at 14:50

## 2017-11-08 RX ADMIN — CEFAZOLIN SODIUM 2 G: 1 INJECTION, POWDER, FOR SOLUTION INTRAMUSCULAR; INTRAVENOUS at 07:23

## 2017-11-08 RX ADMIN — FERROUS SULFATE TAB EC 324 MG (65 MG FE EQUIVALENT) 324 MG: 324 (65 FE) TABLET DELAYED RESPONSE at 17:46

## 2017-11-08 RX ADMIN — EPHEDRINE SULFATE 10 MG: 50 INJECTION INTRAMUSCULAR; INTRAVENOUS; SUBCUTANEOUS at 07:37

## 2017-11-08 RX ADMIN — SIMETHICONE CHEW TAB 80 MG 80 MG: 80 TABLET ORAL at 17:46

## 2017-11-08 RX ADMIN — IBUPROFEN 800 MG: 800 TABLET ORAL at 21:42

## 2017-11-08 RX ADMIN — Medication 50 ML: at 07:49

## 2017-11-08 RX ADMIN — OXYCODONE HYDROCHLORIDE AND ACETAMINOPHEN 2 TABLET: 5; 325 TABLET ORAL at 19:51

## 2017-11-08 RX ADMIN — EPHEDRINE SULFATE 10 MG: 50 INJECTION INTRAMUSCULAR; INTRAVENOUS; SUBCUTANEOUS at 08:05

## 2017-11-08 RX ADMIN — IBUPROFEN 800 MG: 800 TABLET ORAL at 13:03

## 2017-11-08 RX ADMIN — PHENYLEPHRINE HYDROCHLORIDE 100 MCG: 10 INJECTION INTRAVENOUS at 07:38

## 2017-11-08 RX ADMIN — SIMETHICONE CHEW TAB 80 MG 80 MG: 80 TABLET ORAL at 21:42

## 2017-11-08 RX ADMIN — SODIUM CHLORIDE, POTASSIUM CHLORIDE, SODIUM LACTATE AND CALCIUM CHLORIDE 1000 ML: 600; 310; 30; 20 INJECTION, SOLUTION INTRAVENOUS at 05:42

## 2017-11-08 RX ADMIN — ONDANSETRON 8 MG: 2 INJECTION INTRAMUSCULAR; INTRAVENOUS at 07:10

## 2017-11-08 RX ADMIN — PHENYLEPHRINE HYDROCHLORIDE 100 MCG: 10 INJECTION INTRAVENOUS at 07:25

## 2017-11-08 RX ADMIN — PHENYLEPHRINE HYDROCHLORIDE 100 MCG: 10 INJECTION INTRAVENOUS at 07:22

## 2017-11-08 RX ADMIN — EPHEDRINE SULFATE 10 MG: 50 INJECTION INTRAMUSCULAR; INTRAVENOUS; SUBCUTANEOUS at 07:40

## 2017-11-08 RX ADMIN — Medication: at 08:57

## 2017-11-08 RX ADMIN — SODIUM CITRATE AND CITRIC ACID MONOHYDRATE 30 ML: 500; 334 SOLUTION ORAL at 07:08

## 2017-11-08 RX ADMIN — MORPHINE SULFATE: 1 INJECTION INTRAVENOUS at 08:57

## 2017-11-08 RX ADMIN — DOCUSATE SODIUM 100 MG: 100 CAPSULE, LIQUID FILLED ORAL at 17:46

## 2017-11-08 RX ADMIN — SODIUM CHLORIDE, POTASSIUM CHLORIDE, SODIUM LACTATE AND CALCIUM CHLORIDE: 600; 310; 30; 20 INJECTION, SOLUTION INTRAVENOUS at 07:00

## 2017-11-08 RX ADMIN — Medication 150 ML: at 07:55

## 2017-11-08 RX ADMIN — BUPIVACAINE HYDROCHLORIDE 1.6 ML: 7.5 INJECTION, SOLUTION EPIDURAL; RETROBULBAR at 07:20

## 2017-11-08 NOTE — PAYOR COMM NOTE
"Tanja Brice (22 y.o. Female)     Date of Birth Social Security Number Address Home Phone MRN    1995  [address] [facility] [address] [phone] [id number]    Quaker Marital Status          B[place]ai        Admission Date Admission Type Admitting Provider Attending Provider Department, Room/Bed    17 Elective Jarad Oliver MD Stevens, Joshua David, MD [facility] LABOR DELIVERY, L773/    Discharge Date Discharge Disposition Discharge Destination                      Attending Provider: Jarad Oliver MD     Allergies:  No Known Allergies    Isolation:  None   Infection:  None   Code Status:  FULL    Ht:  63\" (160 cm)   Wt:  219 lb (99.3 kg)    Admission Cmt:  None   Principal Problem:  None                Active Insurance as of 2017     Primary Coverage     Payor Plan Insurance Group Employer/Plan Group    [facility]      Payor Plan Address Payor Plan Phone Number Effective From Effective To    [address] [address]  3/1/2017     [place] [address]       Subscriber Name Subscriber Birth Date Member ID       TANJA BRICE 1995 [id number]                 Emergency Contacts      (Rel.) Home Phone Work Phone Mobile Phone    Gato Brice (Friend) [phone] -- [phone]        [facility]  [phone]  Phone  [phone]    Fax  Labor and Delivery Summary under separate cover.   Admit Inpatient       History & Physical      Jarad Oliver MD at 2017  6:54 AM          Tanja Brice  : 1995  MRN: [id number]  CSN: [id number]    History and Physical    Tanja Brice is a 22 y.o. year old  with an Estimated Date of Delivery: 11/15/17 presenting for 1LTCS.  She has a known posterior placenta.  She is planning for sterilization at the time of the .    It has been benign.    Obstetric " History       T2      L2     SAB0   TAB0   Ectopic0   Multiple0   Live Births2       # Outcome Date GA Lbr Alexis/2nd Weight Sex Delivery Anes PTL Lv   3 Current            2 Term 14 40w1d  9 lb (4.082 kg) F Vag-Spont None N RONY      Apgar1:  8                Apgar5: 9   1 Term 10/14/12 40w5d  10 lb 13 oz (4.905 kg) M Vag-Spont None N RONY        Past Medical History:   Diagnosis Date   • Anemia during pregnancy in third trimester 2017   • Anxiety    • Chest pain, unspecified    • Contact dermatitis due to poison ivy    • Exanthem due to herpes zoster    • H/O echocardiogram 12/15/2015    Normal LV function with Ef 60% without regional wall motion abnormalities. Normal Ev size with normal function. Normal diastolic function. No significant valvular regurgitation or stenosis   • Herpes zoster ophthalmicus     no ocular involvement      • History of chicken pox    • History of Holter monitoring 12/15/2015    Sinus mechanism with normal average heart rate with no evicence of chronotropic incompetence. Normal burden of ventricular and supraventricular ectopic event. Symptoms correlated only with sinus mechanism   • Migraine    • Obesity affecting pregnancy 2017   • Palpitations    • Shingles    • Temporomandibular joint disorder      Past Surgical History:   Procedure Laterality Date   • FOREIGN BODY REMOVAL Left 2017    splinter removed from left great toe. nerve block used.    • WISDOM TOOTH EXTRACTION         Current Facility-Administered Medications:   •  lactated ringers infusion, 125 mL/hr, Intravenous, Continuous, Jarad Oliver MD  •  lidocaine (XYLOCAINE) 1 % injection 5 mL, 5 mL, Intradermal, PRN, Jarad Oliver MD  •  Oxytocin-Lactated Ringers (PITOCIN) 20 UNIT/L in lactated Ringer's 1000 mL IVPB  - ADS Override Pull, , , ,   •  sodium chloride 0.9 % flush 1-10 mL, 1-10 mL, Intravenous, PRN, Jarad Oliver MD  Prior to Admission medications    Medication  "Sig Start Date End Date Taking? Authorizing Provider   ferrous sulfate 324 (65 Fe) MG tablet delayed-release EC tablet Take 1 tablet by mouth 3 (Three) Times a Day With Meals. 17   TERESA Zimmerman   Prenatal Vit-Fe Fumarate-FA (MYNATAL PLUS) tablet Take 1 tablet by mouth Daily. 17   TERESA Zimmerman         No Known Allergies  Smoking status: Never Smoker                                                              Smokeless status: Never Used                        Review of Systems    /85  Pulse 106  Temp 98.2 °F (36.8 °C) (Oral)   Resp 18  Ht 63\" (160 cm)  Wt 219 lb (99.3 kg)  LMP 2017 (Exact Date)  SpO2 97%  Breastfeeding? Yes  BMI 38.79 kg/m2  General: well developed; well nourished  no acute distress   Heart: Not performed.   Lungs: breathing is unlabored   Abdomen: soft, non-tender; no masses  no umbilical or inginual hernias are present  no hepato-splenomegaly TAUS confirms breech presentation.     Prenatal Labs  Lab Results   Component Value Date    HEPBSAG Negative 2017    ABO O 2017    RH Positive 2017    ABSCRN Negative 2017       Recent Labs  Lab Results   Component Value Date    HGB 9.5 (L) 2017    HCT 30.5 (L) 2017    WBC 6.30 2017     2017        Assessment   1. IUP with an Estimated Date of Delivery: 11/15/17  2. Planned  section due to malpresentation: breech  3. GBS bacteriuria earlier this pregnancy  4. Desires permanent sterilization     Plan   1. Primary    2.  The risks, benefits. and alternatives to  section were discussed.  The risks that were discussed included, but were not limited to, pain, infection, bleeding, hemorrhage; including need for transfusion to which she would have no objection; injury to the bowel, bladder, uterus, tubes, ovaries, or baby which could require further surgery or prolonged hospitalization. We reiterated the permanence of sterilization and the " failure rate.  The patient understands that we'll have leg pumps on her legs to try and reduce the risk of clot formation her legs.  She understands that we will have early ambulation to also reduce the risk of blood clot formation and to reduce the risk of pneumonia.  She will be given antibiotics prior to her surgery to help decrease the risk for infection.  All questions were answered.        This document has been electronically signed by Jarad Oliver MD on November 8, 2017 6:55 AM           Electronically signed by Jarad Oliver MD at 11/8/2017  7:06 AM        Hospital Medications (all)       Dose Frequency Start End    carboprost (HEMABATE) injection 250 mcg 250 mcg As Needed 11/8/2017     Sig - Route: Inject 1 mL into the shoulder, thigh, or buttocks As Needed (hemorrhage). - Intramuscular    lactated ringers bolus 1,000 mL 1,000 mL Once 11/8/2017 11/8/2017    Sig - Route: Infuse 1,000 mL into a venous catheter 1 (One) Time. - Intravenous    lactated ringers infusion 125 mL/hr Continuous 11/8/2017     Sig - Route: Infuse 125 mL/hr into a venous catheter Continuous. - Intravenous    lidocaine (XYLOCAINE) 1 % injection 5 mL 5 mL As Needed 11/8/2017     Sig - Route: Inject 5 mL into the skin As Needed (IV starts). - Intradermal    methylergonovine (METHERGINE) injection 200 mcg 200 mcg Once As Needed 11/8/2017     Sig - Route: Inject 1 mL into the shoulder, thigh, or buttocks 1 (One) Time As Needed (hemorrhage). - Intramuscular    misoprostol (CYTOTEC) tablet 800 mcg 800 mcg As Needed 11/8/2017     Sig - Route: Insert 4 tablets into the rectum As Needed (Postpartum Hemorrhage). - Rectal    Morphine sulfate PCA 1 mg/mL 30 mL syringe  Continuous 11/8/2017 11/22/2017    Sig - Route: Infuse  into a venous catheter Continuous. - Intravenous    naloxone (NARCAN) injection 0.1 mg 0.1 mg Every 5 Minutes PRN 11/8/2017     Sig - Route: Infuse 0.25 mL into a venous catheter Every 5 (Five) Minutes As Needed  for Opioid Reversal or Respiratory Depression (see administration instructions). - Intravenous    Oxytocin-Lactated Ringers (PITOCIN) 20 UNIT/L in lactated Ringer's 1000 mL IVPB  - ADS Override Pull   11/8/2017 11/8/2017    Notes to Pharmacy: CELSO FANG: cabinet override    Oxytocin-Lactated Ringers (PITOCIN) 20 UNIT/L in lactated Ringer's 1000 mL IVPB  - ADS Override Pull   11/8/2017 11/8/2017    Notes to Pharmacy: ANDREY BIGGS: cabinet override    Sod Citrate-Citric Acid (BICITRA) solution 30 mL 30 mL Once 11/8/2017 11/8/2017    Sig - Route: Take 30 mL by mouth 1 (One) Time. - Oral    sodium chloride 0.9 % flush 1-10 mL 1-10 mL As Needed 11/8/2017     Sig - Route: Infuse 1-10 mL into a venous catheter As Needed for Line Care. - Intravenous            Lab Results (last 24 hours)     Procedure Component Value Units Date/Time    CBC (No Diff) [[id number]]  (Abnormal) Collected:  11/08/17 0512    Specimen:  Blood Updated:  11/08/17 0516     WBC 6.30 10*3/mm3      RBC 4.03 10*6/mm3      Hemoglobin 9.5 (L) g/dL      Hematocrit 30.5 (L) %      MCV 75.7 (L) fL      MCH 23.6 (L) pg      MCHC 31.1 (L) g/dL      RDW 15.2 (H) %      RDW-SD 42.3 fl      MPV 9.3 fL      Platelets 216 10*3/mm3     Urine Drug Screen - Urine, Clean Catch [[id number]]  (Normal) Collected:  11/08/17 0512    Specimen:  Urine from Urine, Clean Catch Updated:  11/08/17 0537     Amphetamine Screen, Urine Negative     Barbiturates Screen, Urine Negative     Benzodiazepine Screen, Urine Negative     Cocaine Screen, Urine Negative     Methadone Screen, Urine Negative     Opiate Screen Negative     Oxycodone Screen, Urine Negative     THC, Screen, Urine Negative    Narrative:       Negative Thresholds For Drugs Screened in Urine:     Amphetamines          500 ng/ml  Barbiturates          200 ng/ml  Benzodiazepines       200 ng/ml  Cocaine               150 ng/ml  Methadone             300 ng/mL  Opiates               300 ng/mL  Oxycodone             100  ng/mL  THC                   20 ng/mL    The normal value for all drugs tested is negative. This report includes final unconfirmed screening results.  A positive result by this assay can be, at your request, sent to the Reference Lab for confirmation by gas chromatography. Unconfirmed results must not be used for non-medical purposes, such as employment or legal testing. Clinical consideration should be applied to any drug of abuse test result, particularly when unconfirmed results are used.    Extra Tubes [[id number]] Collected:  17    Specimen:  Blood from Blood, Venous Line Updated:  17    Narrative:       The following orders were created for panel order Extra Tubes.  Procedure                               Abnormality         Status                     ---------                               -----------         ------                     Gold Top - SST[[id number]]                                   Final result                 Please view results for these tests on the individual orders.    Gold Top - SST [[id number]] Collected:  17    Specimen:  Blood Updated:  17     Extra Tube Hold for add-ons.      Auto resulted.       Tissue Pathology Exam - Tissue, Placenta [[id number]] Collected:  17 0759    Specimen:  Tissue from Fallopian Tube, Left; Tissue from Fallopian Tube, Right Updated:  17 1032        Imaging Results (last 24 hours)     ** No results found for the last 24 hours. **           Operative/Procedure Notes (last 24 hours) (Notes from 2017 10:36 AM through 2017 10:36 AM)      Jarad Oliver MD at 2017  8:14 AM  Version 1 of 1         [facility]  Tanja Brice  : 1995  MRN: [id number]  CSN: [id number]     Section Operative Note    Pre-Operative Dx:   1. IUP at 39w0d weeks   2. Breech presentation  3. Desires permanent sterilization      Postoperative dx:    2. IUP at 39 wk  3. Breech  presentation  4. Desires permanent sterilization     Procedure: Primary  (LTCS) with BTL       Surgeon: Jarad Oliver MD   Assistant: Sunni Baca       Anesthesia: Spinal        EBL: 1100 mls.   IV Fluids: 1300 mls.       Antibiotics: cefazolin 2 gms     Infant:           Gender: male  infant    Weight: 8#11    Apgars:    @ 1 minute /        @ 5 minutes    Cord gases: Venous:  @BABYNOHDR(BRIEFLAB, PHCVEN, BECVEN)@     Arterial:  @BABYNOHDR(BRIEFLAB, PHCART, BECART)@     Procedure Details:   Prepped and draped in the normal sterile fashion. Dorsal supine position with a slight leftward tilt. Pfannenstiel incision made with scalpel and carried through to the underlying layer of fascia with the Bovie. Fascia was incised in the midline and this incision was extended bilaterally with sharp dissection. Superior aspect of fascia was elevated and the underlying layer of muscle dissected off with sharp and blunt dissection. Inferior aspect in similar fashion. Rectus muscles  in the midline. Peritoneum entered bluntly and this was extended superiorly and inferiorly. Hysterotomy was made with scalpel and extended bluntly. The infants feet were grasped and he was delivered stepwise in breech fashion being careful to reduce upper extremities before easily clearing the head. Nuchal cord x 1 was reduced. The cord was clamped and cut and infant was passed off to the awaiting nursing staff. Placenta was delivered spontaneously. The uterus was exteriorized and cleared of all clots and debris. Hysterotomy was closed with 0 Vicryl and additional figure of 8 sutures applied for hemostasis. The left fallopian tube was grasped with ami clamp and a window made in the mesosalpinx. The tube was ligated proximal and distal to the clamp and the middle segment was excised. The right tube in similar fashion was elevated with ami, a window made in the mesosalpinx, ligated proximal and distal with a segment  excised. Uterus was returned to the patient's abdomen and gutters cleared of all clot. Again hemostasis was noted. Fascia was reapproximated with vicryl and irrigated. Subcutaneous tissue was reapproximated with plain gut and the skin closed with Monocryl subcuticular stitch.   All counts correct.        Complications:   None      Disposition:   Mother to Mother Baby/Postpartum  in stable condition currently.   Baby to remains with mom  in stable condition currently.       Jarad Oliver MD  11/8/2017  8:14 AM       Electronically signed by Jarad Oliver MD at 11/8/2017  8:22 AM        Physician Progress Notes (last 24 hours) (Notes from 11/7/2017 10:36 AM through 11/8/2017 10:36 AM)     No notes of this type exist for this encounter.        Medical Student Notes (last 24 hours) (Notes from 11/7/2017 10:36 AM through 11/8/2017 10:36 AM)     No notes of this type exist for this encounter.        Consult Notes (last 24 hours) (Notes from 11/7/2017 10:36 AM through 11/8/2017 10:36 AM)     No notes of this type exist for this encounter.

## 2017-11-08 NOTE — PROGRESS NOTES
POSTPARTUM PROGRESS NOTE  NAME: Tanja Brice  : 1995  MRN: [id number]      LOS: 0 days     Chief Complaint: No Complaints    Subjective:     Interval History:  Pain well controlled with PCA pump & ibuprofen, (-) Flatus, No BM, lochia appropriate, zhao catheter in place, (-) ambulation.  Bilateral tubal ligation for birth control. Breastfeeding.     Review of Systems:   GEN: No fever/chills, no dizziness or lightheadedness  CVS: No chest pain, no palpitations  RESP: No shortness of breath  GI: No nausea or vomiting    Objective:     Vital Signs  Temp:  [97.6 °F (36.4 °C)-98.2 °F (36.8 °C)] 97.6 °F (36.4 °C)  Heart Rate:  [] 81  Resp:  [18-20] 18  BP: (114-131)/(56-85) 128/75    Physical Exam  GEN: A&O x3, NAD  CVS: RRR, normal S1/S2, no m/g/r  LUNGS: CTAB, no wheezes, no rhonchi  ABD: Soft, Nontender  Fundus: firm below umbilicus; Incision c/d/i  EXT: no edema, no calf tenderness bilaterally.    Medication Review    Current Facility-Administered Medications:   •  bisacodyl (DULCOLAX) suppository 10 mg, 10 mg, Rectal, Daily PRN, Jarad Oliver MD  •  diphenhydrAMINE (BENADRYL) capsule 25 mg, 25 mg, Oral, Q4H PRN **OR** diphenhydrAMINE (BENADRYL) injection 25 mg, 25 mg, Intravenous, Q4H PRN **OR** diphenhydrAMINE (BENADRYL) injection 25 mg, 25 mg, Intramuscular, Q4H PRN, Benedict Nieto CRNA  •  docusate sodium (COLACE) capsule 100 mg, 100 mg, Oral, BID PRN, Jarad Oliver MD  •  ferrous sulfate EC tablet 324 mg, 324 mg, Oral, TID With Meals, Jarad Oliver MD, 324 mg at 17 1303  •  ibuprofen (ADVIL,MOTRIN) tablet 800 mg, 800 mg, Oral, Q8H, Jraad Oliver MD, 800 mg at 17 1303  •  magnesium hydroxide (MILK OF MAGNESIA) suspension 2400 mg/10mL 10 mL, 10 mL, Oral, Daily PRN, Jarad Oliver MD  •  Morphine sulfate PCA 1 mg/mL 30 mL syringe, , Intravenous, Continuous, Jarad Oliver MD  •  naloxone (NARCAN) injection 0.1 mg, 0.1 mg,  Intravenous, Q5 Min PRN, Jarad Oliver MD  •  naloxone (NARCAN) injection 0.2 mg, 0.2 mg, Intravenous, Q15 Min PRN, Benedict Nieto CRNA  •  ondansetron (ZOFRAN) injection 4 mg, 4 mg, Intravenous, Once PRN, Benedict Nieto CRNA  •  ondansetron (ZOFRAN) tablet 4 mg, 4 mg, Oral, Q8H PRN, Jarad Oliver MD  •  oxyCODONE-acetaminophen (PERCOCET) 5-325 MG per tablet 1 tablet, 1 tablet, Oral, Q4H PRN, Jarad Oliver MD  •  oxyCODONE-acetaminophen (PERCOCET) 5-325 MG per tablet 2 tablet, 2 tablet, Oral, Q4H PRN, Jarad Oliver MD  •  Oxytocin-Lactated Ringers (PITOCIN) 20 UNIT/L in lactated Ringer's 1000 mL IVPB  - ADS Override Pull, , , ,   •  prenatal vitamin 27-0.8 tablet 1 tablet, 1 tablet, Oral, Daily, Jarad Oliver MD, 1 tablet at 11/08/17 1303  •  promethazine (PHENERGAN) tablet 12.5 mg, 12.5 mg, Oral, Q4H PRN, Jarad Oliver MD  •  simethicone (MYLICON) chewable tablet 80 mg, 80 mg, Oral, 4x Daily PRN, Jarad Oliver MD     Diagnostic Data    Lab Results (last 24 hours)     Procedure Component Value Units Date/Time    CBC (No Diff) [[id number]]  (Abnormal) Collected:  11/08/17 0512    Specimen:  Blood Updated:  11/08/17 0516     WBC 6.30 10*3/mm3      RBC 4.03 10*6/mm3      Hemoglobin 9.5 (L) g/dL      Hematocrit 30.5 (L) %      MCV 75.7 (L) fL      MCH 23.6 (L) pg      MCHC 31.1 (L) g/dL      RDW 15.2 (H) %      RDW-SD 42.3 fl      MPV 9.3 fL      Platelets 216 10*3/mm3     Urine Drug Screen - Urine, Clean Catch [[id number]]  (Normal) Collected:  11/08/17 0512    Specimen:  Urine from Urine, Clean Catch Updated:  11/08/17 0537     Amphetamine Screen, Urine Negative     Barbiturates Screen, Urine Negative     Benzodiazepine Screen, Urine Negative     Cocaine Screen, Urine Negative     Methadone Screen, Urine Negative     Opiate Screen Negative     Oxycodone Screen, Urine Negative     THC, Screen, Urine Negative    Narrative:       Negative Thresholds For Drugs  Screened in Urine:     Amphetamines          500 ng/ml  Barbiturates          200 ng/ml  Benzodiazepines       200 ng/ml  Cocaine               150 ng/ml  Methadone             300 ng/mL  Opiates               300 ng/mL  Oxycodone             100 ng/mL  THC                   20 ng/mL    The normal value for all drugs tested is negative. This report includes final unconfirmed screening results.  A positive result by this assay can be, at your request, sent to the Reference Lab for confirmation by gas chromatography. Unconfirmed results must not be used for non-medical purposes, such as employment or legal testing. Clinical consideration should be applied to any drug of abuse test result, particularly when unconfirmed results are used.    Extra Tubes [[id number]] Collected:  17    Specimen:  Blood from Blood, Venous Line Updated:  17    Narrative:       The following orders were created for panel order Extra Tubes.  Procedure                               Abnormality         Status                     ---------                               -----------         ------                     Gold Top - SST[[id number]]                                   Final result                 Please view results for these tests on the individual orders.    Gold Top - SST [[id number]] Collected:  17    Specimen:  Blood Updated:  17     Extra Tube Hold for add-ons.      Auto resulted.       Tissue Pathology Exam - Tissue, Placenta [[id number]] Collected:  17 0759    Specimen:  Tissue from Fallopian Tube, Left; Tissue from Fallopian Tube, Right Updated:  17 1116          I reviewed the patient's new clinical results.    Assessment/Plan:     Tanja Brice 22 y.o.  POD#0 s/p primary LTCS & BTL doing well.   1. Encourage ambulation  2. Continue routine postop care.    Plan for disposition: Discharge home on POD#2 to POD#4    Signature  Saroj Hilario Jr., M.D.  PGY1  Family  Practice Resident  [address] [place] [address]  Phone: [phone]  Fax: [phone]        This document has been electronically signed by Saroj Hilario Jr, MD on November 8, 2017 2:09 PM

## 2017-11-08 NOTE — ANESTHESIA PREPROCEDURE EVALUATION
Anesthesia Evaluation     Patient summary reviewed and Nursing notes reviewed   no history of anesthetic complications:  NPO Solid Status: > 8 hours  NPO Liquid Status: > 8 hours     Airway   Mallampati: II  TM distance: >3 FB  Neck ROM: full  no difficulty expected  Dental - normal exam     Pulmonary - negative pulmonary ROS and normal exam    breath sounds clear to auscultation  (-) shortness of breath  Cardiovascular - negative cardio ROS and normal exam  Exercise tolerance: good (4-7 METS)    Rhythm: regular  Rate: normal    (-) CAD, angina, orthopnea, ALVARENGA      Neuro/Psych  (+) psychiatric history Anxiety,    (-) CVA  GI/Hepatic/Renal/Endo    (+) obesity,      Musculoskeletal (-) negative ROS    Abdominal  - normal exam   Substance History - negative use     OB/GYN    (+) Pregnant,         Other - negative ROS                                       Anesthesia Plan    ASA 2     spinal     intravenous induction   Anesthetic plan and risks discussed with patient.

## 2017-11-08 NOTE — H&P
Tanja Brice  : 1995  MRN: [id number]  CSN: [id number]    History and Physical    Tanja Brice is a 22 y.o. year old  with an Estimated Date of Delivery: 11/15/17 presenting for 1LTCS.  She has a known posterior placenta.  She is planning for sterilization at the time of the .    It has been benign.    Obstetric History       T2      L2     SAB0   TAB0   Ectopic0   Multiple0   Live Births2       # Outcome Date GA Lbr Alexis/2nd Weight Sex Delivery Anes PTL Lv   3 Current            2 Term 14 40w1d  9 lb (4.082 kg) F Vag-Spont None N RONY      Apgar1:  8                Apgar5: 9   1 Term 10/14/12 40w5d  10 lb 13 oz (4.905 kg) M Vag-Spont None N RONY        Past Medical History:   Diagnosis Date   • Anemia during pregnancy in third trimester 2017   • Anxiety    • Chest pain, unspecified    • Contact dermatitis due to poison ivy    • Exanthem due to herpes zoster    • H/O echocardiogram 12/15/2015    Normal LV function with Ef 60% without regional wall motion abnormalities. Normal Ev size with normal function. Normal diastolic function. No significant valvular regurgitation or stenosis   • Herpes zoster ophthalmicus     no ocular involvement      • History of chicken pox    • History of Holter monitoring 12/15/2015    Sinus mechanism with normal average heart rate with no evicence of chronotropic incompetence. Normal burden of ventricular and supraventricular ectopic event. Symptoms correlated only with sinus mechanism   • Migraine    • Obesity affecting pregnancy 2017   • Palpitations    • Shingles    • Temporomandibular joint disorder      Past Surgical History:   Procedure Laterality Date   • FOREIGN BODY REMOVAL Left 2017    splinter removed from left great toe. nerve block used.    • WISDOM TOOTH EXTRACTION         Current Facility-Administered Medications:   •  lactated ringers infusion, 125 mL/hr, Intravenous, Continuous, Jarad Lau  "MD Benito  •  lidocaine (XYLOCAINE) 1 % injection 5 mL, 5 mL, Intradermal, PRN, Jarad Oliver MD  •  Oxytocin-Lactated Ringers (PITOCIN) 20 UNIT/L in lactated Ringer's 1000 mL IVPB  - ADS Override Pull, , , ,   •  sodium chloride 0.9 % flush 1-10 mL, 1-10 mL, Intravenous, PRN, Jarad Oliver MD  Prior to Admission medications    Medication Sig Start Date End Date Taking? Authorizing Provider   ferrous sulfate 324 (65 Fe) MG tablet delayed-release EC tablet Take 1 tablet by mouth 3 (Three) Times a Day With Meals. 17   TERESA Zimmerman   Prenatal Vit-Fe Fumarate-FA (MYNATAL PLUS) tablet Take 1 tablet by mouth Daily. 17   TERESA Zimmerman         No Known Allergies  Smoking status: Never Smoker                                                              Smokeless status: Never Used                        Review of Systems    /85  Pulse 106  Temp 98.2 °F (36.8 °C) (Oral)   Resp 18  Ht 63\" (160 cm)  Wt 219 lb (99.3 kg)  LMP 2017 (Exact Date)  SpO2 97%  Breastfeeding? Yes  BMI 38.79 kg/m2  General: well developed; well nourished  no acute distress   Heart: Not performed.   Lungs: breathing is unlabored   Abdomen: soft, non-tender; no masses  no umbilical or inginual hernias are present  no hepato-splenomegaly TAUS confirms breech presentation.     Prenatal Labs  Lab Results   Component Value Date    HEPBSAG Negative 2017    ABO O 2017    RH Positive 2017    ABSCRN Negative 2017       Recent Labs  Lab Results   Component Value Date    HGB 9.5 (L) 2017    HCT 30.5 (L) 2017    WBC 6.30 2017     2017        Assessment   1. IUP with an Estimated Date of Delivery: 11/15/17  2. Planned  section due to malpresentation: breech  3. GBS bacteriuria earlier this pregnancy  4. Desires permanent sterilization     Plan   1. Primary    2.  The risks, benefits. and alternatives to  section were discussed. "  The risks that were discussed included, but were not limited to, pain, infection, bleeding, hemorrhage; including need for transfusion to which she would have no objection; injury to the bowel, bladder, uterus, tubes, ovaries, or baby which could require further surgery or prolonged hospitalization. We reiterated the permanence of sterilization and the failure rate.  The patient understands that we'll have leg pumps on her legs to try and reduce the risk of clot formation her legs.  She understands that we will have early ambulation to also reduce the risk of blood clot formation and to reduce the risk of pneumonia.  She will be given antibiotics prior to her surgery to help decrease the risk for infection.  All questions were answered.        This document has been electronically signed by Jarad Oliver MD on November 8, 2017 6:55 AM

## 2017-11-08 NOTE — OP NOTE
[facility]  Tanja Brice  : 1995  MRN: [id number]  CSN: [id number]     Section Operative Note    Pre-Operative Dx:   1. IUP at 39w0d weeks   2. Breech presentation  3. Desires permanent sterilization      Postoperative dx:    1. IUP at 39 wk  2. Breech presentation  3. Desires permanent sterilization     Procedure: Primary  (LTCS) with BTL       Surgeon: Jarad Oliver MD   Assistant: Sunni Baca       Anesthesia: Spinal        EBL: 1100 mls.   IV Fluids: 1300 mls.       Antibiotics: cefazolin 2 gms     Infant:           Gender: male  infant    Weight: 8#11    Apgars:    @ 1 minute /        @ 5 minutes    Cord gases: Venous:  @BABYNOHDR(BRIEFLAB, PHCVEN, BECVEN)@     Arterial:  @BABYNOHDR(BRIEFLAB, PHCART, BECART)@     Procedure Details:   Prepped and draped in the normal sterile fashion. Dorsal supine position with a slight leftward tilt. Pfannenstiel incision made with scalpel and carried through to the underlying layer of fascia with the Bovie. Fascia was incised in the midline and this incision was extended bilaterally with sharp dissection. Superior aspect of fascia was elevated and the underlying layer of muscle dissected off with sharp and blunt dissection. Inferior aspect in similar fashion. Rectus muscles  in the midline. Peritoneum entered bluntly and this was extended superiorly and inferiorly. Hysterotomy was made with scalpel and extended bluntly. The infants feet were grasped and he was delivered stepwise in breech fashion being careful to reduce upper extremities before easily clearing the head. Nuchal cord x 1 was reduced. The cord was clamped and cut and infant was passed off to the awaiting nursing staff. Placenta was delivered spontaneously. The uterus was exteriorized and cleared of all clots and debris. Hysterotomy was closed with 0 Vicryl and additional figure of 8 sutures applied for hemostasis. The left fallopian tube was grasped with  ami clamp and a window made in the mesosalpinx. The tube was ligated proximal and distal to the clamp and the middle segment was excised. The right tube in similar fashion was elevated with ami, a window made in the mesosalpinx, ligated proximal and distal with a segment excised. Uterus was returned to the patient's abdomen and gutters cleared of all clot. Again hemostasis was noted. Fascia was reapproximated with vicryl and irrigated. Subcutaneous tissue was reapproximated with plain gut and the skin closed with Monocryl subcuticular stitch.   All counts correct.        Complications:   None      Disposition:   Mother to Mother Baby/Postpartum  in stable condition currently.   Baby to remains with mom  in stable condition currently.       Jarad Oliver MD  11/8/2017  8:14 AM

## 2017-11-08 NOTE — NURSING NOTE
Dr Oliver at bedside discussing risks and benefits of  with patient. All questions answered at this time. Bedside US done to confirm baby is still breech at this time.

## 2017-11-08 NOTE — PAYOR COMM NOTE
"Tanja Brice (22 y.o. Female)     Date of Birth Social Security Number Address Home Phone MRN    1995  [address] [address] [phone] [id number]    Uats[place] Marital Status          Yazdanism        Admission Date Admission Type Admitting Provider Attending Provider Department, Room/Bed    11/8/17 Elective Jarad Oliver MD Stevens, Joshua David, MD [facility] LABOR DELIVERY, L773/1    Discharge Date Discharge Disposition Discharge Destination                      Attending Provider: Jarad Oliver MD     Allergies:  No Known Allergies    Isolation:  None   Infection:  None   Code Status:  FULL    Ht:  63\" (160 cm)   Wt:  219 lb (99.3 kg)    Admission Cmt:  None   Principal Problem:  None                Active Insurance as of 11/8/2017     Primary Coverage     Payor Plan Insurance Group Employer/Plan Group    [facility] [place] [facility]      Payor Plan Address Payor Plan Phone Number Effective From Effective To    [address] [address]  3/1/2017     [place] [address]       Subscriber Name Subscriber Birth Date Member ID       TANJA BRICE 1995 [id number]                 Emergency Contacts      (Rel.) Home Phone Work Phone Mobile Phone    Gato Brice (Friend) [phone] -- [phone]        SHEEBA Silver  [facility]  [phone]   Phone  [phone]    Fax  :Labor and Delivery Summary      "

## 2017-11-08 NOTE — ANESTHESIA PROCEDURE NOTES
Spinal Block    Patient location during procedure: OB  Indication:procedure for pain  Performed By  CRNA: SPENCER CHAUDHRY  Preanesthetic Checklist  Completed: patient identified, surgical consent, pre-op evaluation, timeout performed, IV checked, risks and benefits discussed and monitors and equipment checked  Spinal Block Prep:  Patient Position:sitting  Sterile Tech:cap, gloves, gown, mask and sterile barriers  Prep:Betadine  Patient Monitoring:blood pressure monitoring, continuous pulse oximetry and EKG  Spinal Block Procedure  Approach:midline  Guidance:landmark technique  Location:L3-L4  Needle Type:Pencan  Needle Gauge:24 G  Placement of Spinal needle event:cerebrospinal fluid aspirated  Paresthesia: transient and right  Fluid Appearance:clear  Post Assessment  Patient Tolerance:patient tolerated the procedure well with no apparent complications  Complications no

## 2017-11-08 NOTE — ANESTHESIA POSTPROCEDURE EVALUATION
Patient: Tanja Brice    Procedure Summary     Date Anesthesia Start Anesthesia Stop Room / Location    17 0710 0840 [facility] LABOR DELIVERY 1 / [facility] LABOR DELIVERY       Procedure Diagnosis Surgeon Provider     SECTION PRIMARY (N/A Abdomen) No diagnosis on file. Jarad Oliver MD No responsible provider of record.          Anesthesia Type: spinal  Last vitals  BP   128/85 (17 0506)   Temp   98.2 °F (36.8 °C) (17 050)   Pulse   106 (17 0523)   Resp   18 (17 050)     SpO2   97 % (17)     Post Anesthesia Care and Evaluation    Patient location during evaluation: bedside  Patient participation: complete - patient participated  Level of consciousness: awake and alert  Pain score: 2  Pain management: adequate  Airway patency: patent  Anesthetic complications: No anesthetic complications  PONV Status: none  Cardiovascular status: acceptable  Respiratory status: acceptable  Hydration status: acceptable

## 2017-11-09 LAB
ANISOCYTOSIS BLD QL: NORMAL
BASOPHILS # BLD AUTO: 0.01 10*3/MM3 (ref 0–0.2)
BASOPHILS NFR BLD AUTO: 0.1 % (ref 0–2)
DACRYOCYTES BLD QL SMEAR: NORMAL
DEPRECATED RDW RBC AUTO: 43.3 FL (ref 36.4–46.3)
EOSINOPHIL # BLD AUTO: 0.02 10*3/MM3 (ref 0–0.7)
EOSINOPHIL NFR BLD AUTO: 0.3 % (ref 0–7)
ERYTHROCYTE [DISTWIDTH] IN BLOOD BY AUTOMATED COUNT: 15.4 % (ref 11.5–14.5)
HCT VFR BLD AUTO: 22.2 % (ref 35–45)
HGB BLD-MCNC: 7 G/DL (ref 12–15.5)
IMM GRANULOCYTES # BLD: 0.06 10*3/MM3 (ref 0–0.02)
IMM GRANULOCYTES NFR BLD: 0.8 % (ref 0–0.5)
LAB AP CASE REPORT: NORMAL
LYMPHOCYTES # BLD AUTO: 1.69 10*3/MM3 (ref 0.6–4.2)
LYMPHOCYTES NFR BLD AUTO: 23.4 % (ref 10–50)
Lab: NORMAL
MCH RBC QN AUTO: 24.1 PG (ref 26.5–34)
MCHC RBC AUTO-ENTMCNC: 31.5 G/DL (ref 31.4–36)
MCV RBC AUTO: 76.3 FL (ref 80–98)
MONOCYTES # BLD AUTO: 0.75 10*3/MM3 (ref 0–0.9)
MONOCYTES NFR BLD AUTO: 10.4 % (ref 0–12)
NEUTROPHILS # BLD AUTO: 4.69 10*3/MM3 (ref 2–8.6)
NEUTROPHILS NFR BLD AUTO: 65 % (ref 37–80)
OVALOCYTES BLD QL SMEAR: NORMAL
PATH REPORT.FINAL DX SPEC: NORMAL
PATH REPORT.GROSS SPEC: NORMAL
PLATELET # BLD AUTO: 167 10*3/MM3 (ref 150–450)
PMV BLD AUTO: 9.8 FL (ref 8–12)
POIKILOCYTOSIS BLD QL SMEAR: NORMAL
POLYCHROMASIA BLD QL SMEAR: NORMAL
RBC # BLD AUTO: 2.91 10*6/MM3 (ref 3.77–5.16)
SMALL PLATELETS BLD QL SMEAR: ADEQUATE
WBC MORPH BLD: NORMAL
WBC NRBC COR # BLD: 7.22 10*3/MM3 (ref 3.2–9.8)

## 2017-11-09 PROCEDURE — 85007 BL SMEAR W/DIFF WBC COUNT: CPT | Performed by: OBSTETRICS & GYNECOLOGY

## 2017-11-09 PROCEDURE — 85025 COMPLETE CBC W/AUTO DIFF WBC: CPT | Performed by: OBSTETRICS & GYNECOLOGY

## 2017-11-09 RX ADMIN — OXYCODONE HYDROCHLORIDE AND ACETAMINOPHEN 2 TABLET: 5; 325 TABLET ORAL at 11:18

## 2017-11-09 RX ADMIN — FERROUS SULFATE TAB EC 324 MG (65 MG FE EQUIVALENT) 324 MG: 324 (65 FE) TABLET DELAYED RESPONSE at 11:57

## 2017-11-09 RX ADMIN — IBUPROFEN 800 MG: 800 TABLET ORAL at 05:50

## 2017-11-09 RX ADMIN — IBUPROFEN 800 MG: 800 TABLET ORAL at 21:48

## 2017-11-09 RX ADMIN — FERROUS SULFATE TAB EC 324 MG (65 MG FE EQUIVALENT) 324 MG: 324 (65 FE) TABLET DELAYED RESPONSE at 17:15

## 2017-11-09 RX ADMIN — OXYCODONE HYDROCHLORIDE AND ACETAMINOPHEN 2 TABLET: 5; 325 TABLET ORAL at 03:49

## 2017-11-09 RX ADMIN — IBUPROFEN 800 MG: 800 TABLET ORAL at 14:35

## 2017-11-09 RX ADMIN — FERROUS SULFATE TAB EC 324 MG (65 MG FE EQUIVALENT) 324 MG: 324 (65 FE) TABLET DELAYED RESPONSE at 08:23

## 2017-11-09 RX ADMIN — PRENATAL VIT W/ FE FUMARATE-FA TAB 27-0.8 MG 1 TABLET: 27-0.8 TAB at 08:23

## 2017-11-09 RX ADMIN — DOCUSATE SODIUM 100 MG: 100 CAPSULE, LIQUID FILLED ORAL at 21:03

## 2017-11-09 NOTE — LACTATION NOTE
This note was copied from a baby's chart.  Mother reports breastfeeding is going well. This is mom's 3rd baby. I plan to follow up with her at home.

## 2017-11-09 NOTE — PROGRESS NOTES
POSTPARTUM PROGRESS NOTE  NAME: Tanja Brice  : 1995  MRN: [id number]      LOS: 1 day     Chief Complaint: No Complaints    Subjective:     Interval History:  Pain well controlled with medications, (+) Flatus, No BM, lochia appropriate, zhao catheter in place, (+) ambulation.  Bilateral tubal ligation for birth control. Breastfeeding.     Review of Systems:   GEN: No fever/chills, no dizziness or lightheadedness  CVS: No chest pain, no palpitations  RESP: No shortness of breath  GI: No nausea or vomiting    Objective:     Vital Signs  Temp:  [97.6 °F (36.4 °C)-98.7 °F (37.1 °C)] 98.2 °F (36.8 °C)  Heart Rate:  [] 84  Resp:  [18-20] 18  BP: (108-131)/(56-75) 109/69    Physical Exam  GEN: A&O x3, NAD  CVS: RRR, normal S1/S2, no m/g/r  LUNGS: CTAB, no wheezes, no rhonchi  ABD: Soft, Nontender  Fundus: firm below umbilicus; Incision c/d/i  EXT: no edema, no calf tenderness bilaterally.    Medication Review    Current Facility-Administered Medications:   •  bisacodyl (DULCOLAX) suppository 10 mg, 10 mg, Rectal, Daily PRN, Jarad Oliver MD  •  diphenhydrAMINE (BENADRYL) capsule 25 mg, 25 mg, Oral, Q4H PRN **OR** diphenhydrAMINE (BENADRYL) injection 25 mg, 25 mg, Intravenous, Q4H PRN **OR** diphenhydrAMINE (BENADRYL) injection 25 mg, 25 mg, Intramuscular, Q4H PRN, Benedict Nieto CRNA  •  docusate sodium (COLACE) capsule 100 mg, 100 mg, Oral, BID PRN, Jarad Oliver MD, 100 mg at 17  •  ferrous sulfate EC tablet 324 mg, 324 mg, Oral, TID With Meals, Jarad Oliver MD, 324 mg at 17  •  ibuprofen (ADVIL,MOTRIN) tablet 800 mg, 800 mg, Oral, Q8H, Jarad Oliver MD, 800 mg at 17 0550  •  magnesium hydroxide (MILK OF MAGNESIA) suspension 2400 mg/10mL 10 mL, 10 mL, Oral, Daily PRN, Jarad Olivre MD  •  Morphine sulfate PCA 1 mg/mL 30 mL syringe, , Intravenous, Continuous, Jarad Oliver MD  •  naloxone (NARCAN) injection 0.1 mg,  0.1 mg, Intravenous, Q5 Min PRN, Jarad Oliver MD  •  naloxone (NARCAN) injection 0.2 mg, 0.2 mg, Intravenous, Q15 Min PRN, Benedict Nieto CRNA  •  ondansetron (ZOFRAN) injection 4 mg, 4 mg, Intravenous, Once PRN, Benedict Nieto CRNA  •  ondansetron (ZOFRAN) tablet 4 mg, 4 mg, Oral, Q8H PRN, Jarad Oliver MD  •  oxyCODONE-acetaminophen (PERCOCET) 5-325 MG per tablet 1 tablet, 1 tablet, Oral, Q4H PRN, Jarad Oliver MD  •  oxyCODONE-acetaminophen (PERCOCET) 5-325 MG per tablet 2 tablet, 2 tablet, Oral, Q4H PRN, Jarad Oliver MD, 2 tablet at 11/09/17 0349  •  prenatal vitamin 27-0.8 tablet 1 tablet, 1 tablet, Oral, Daily, Jarad Oliver MD, 1 tablet at 11/08/17 1303  •  promethazine (PHENERGAN) tablet 12.5 mg, 12.5 mg, Oral, Q4H PRN, Jarad Oliver MD  •  simethicone (MYLICON) chewable tablet 80 mg, 80 mg, Oral, 4x Daily PRN, Jarad Oliver MD, 80 mg at 11/08/17 2142     Diagnostic Data    Lab Results (last 24 hours)     Procedure Component Value Units Date/Time    Tissue Pathology Exam - Tissue, Placenta [[id number]] Collected:  11/08/17 0759    Specimen:  Tissue from Fallopian Tube, Left; Tissue from Fallopian Tube, Right Updated:  11/08/17 1116    CBC Auto Differential [[id number]] Collected:  11/09/17 0500    Specimen:  Blood Updated:  11/09/17 0539    CBC & Differential [[id number]] Collected:  11/09/17 0500    Specimen:  Blood Updated:  11/09/17 0637    Narrative:       The following orders were created for panel order CBC & Differential.  Procedure                               Abnormality         Status                     ---------                               -----------         ------                     Scan Slide[[id number]]                                       In process                 CBC Auto Differential[[id number]]                            In process                   Please view results for these tests on the individual orders.    Scan Slide  [[id number]] Collected:  17 0500    Specimen:  Blood Updated:  17 0637          I reviewed the patient's new clinical results.    Assessment/Plan:     Tanja Brice 22 y.o.  POD#1 s/p primary LTCS & BTL doing well.   1. Encourage ambulation  2. Continue routine postop care.    Plan for disposition: Discharge home on POD#2 to POD#4    Signature  Saroj Hilario Jr., M.D.  PGY1  Family Practice Resident  [address] [place] [address]  Phone: [phone]  Fax: [phone]        This document has been electronically signed by Saroj Hilario Jr, MD on 2017 6:41 AM

## 2017-11-09 NOTE — PROGRESS NOTES
POSTPARTUM PROGRESS NOTE  NAME: Tanja Brice  : 1995  MRN: [id number]      LOS: 1 day     Chief Complaint: No Complaints    Subjective:     Interval History:  Pain well controlled with medications, (+) Flatus, No BM, lochia minimal, +voiding, (+) ambulation.  Bilateral tubal ligation for birth control. Breastfeeding.     H&H 9.5/30.5 antepartum & 7.0/22.2 postop.  She denies any fatigue, weakness, headache, or other signs of acute blood loss.  Will recheck H&H in the AM.     Review of Systems:   GEN: No fever/chills, no dizziness or lightheadedness  CVS: No chest pain, no palpitations  RESP: No shortness of breath  GI: No nausea or vomiting    Objective:     Vital Signs  Temp:  [97.7 °F (36.5 °C)-98.7 °F (37.1 °C)] 98.2 °F (36.8 °C)  Heart Rate:  [] 99  Resp:  [16-20] 16  BP: (108-132)/(61-71) 115/68    Physical Exam  GEN: A&O x3, NAD  CVS: RRR, normal S1/S2, no m/g/r  LUNGS: CTAB, no wheezes, no rhonchi  ABD: Soft, Nontender  Fundus: firm below umbilicus; Incision c/d/i  EXT: no edema, no calf tenderness bilaterally.    Medication Review    Current Facility-Administered Medications:   •  bisacodyl (DULCOLAX) suppository 10 mg, 10 mg, Rectal, Daily PRN, Jarad Oliver MD  •  diphenhydrAMINE (BENADRYL) capsule 25 mg, 25 mg, Oral, Q4H PRN **OR** diphenhydrAMINE (BENADRYL) injection 25 mg, 25 mg, Intravenous, Q4H PRN **OR** diphenhydrAMINE (BENADRYL) injection 25 mg, 25 mg, Intramuscular, Q4H PRN, Benedict Nieto CRNA  •  docusate sodium (COLACE) capsule 100 mg, 100 mg, Oral, BID PRN, Jarad Oliver MD, 100 mg at 17 3676  •  ferrous sulfate EC tablet 324 mg, 324 mg, Oral, TID With Meals, Jarad Oliver MD, 324 mg at 17 1157  •  ibuprofen (ADVIL,MOTRIN) tablet 800 mg, 800 mg, Oral, Q8H, Jarad Oliver MD, 800 mg at 17 1435  •  magnesium hydroxide (MILK OF MAGNESIA) suspension 2400 mg/10mL 10 mL, 10 mL, Oral, Daily PRN, Jarad Oliver MD  •   Morphine sulfate PCA 1 mg/mL 30 mL syringe, , Intravenous, Continuous, Jarad Oliver MD  •  naloxone (NARCAN) injection 0.1 mg, 0.1 mg, Intravenous, Q5 Min PRN, Jarad Oliver MD  •  naloxone (NARCAN) injection 0.2 mg, 0.2 mg, Intravenous, Q15 Min PRN, Benedict Nieto CRNA  •  ondansetron (ZOFRAN) tablet 4 mg, 4 mg, Oral, Q8H PRN, Jarad Oliver MD  •  oxyCODONE-acetaminophen (PERCOCET) 5-325 MG per tablet 1 tablet, 1 tablet, Oral, Q4H PRN, Jarad Oliver MD  •  oxyCODONE-acetaminophen (PERCOCET) 5-325 MG per tablet 2 tablet, 2 tablet, Oral, Q4H PRN, Jarad Oliver MD, 2 tablet at 11/09/17 1118  •  prenatal vitamin 27-0.8 tablet 1 tablet, 1 tablet, Oral, Daily, Jarad Oliver MD, 1 tablet at 11/09/17 0823  •  promethazine (PHENERGAN) tablet 12.5 mg, 12.5 mg, Oral, Q4H PRN, Jarad Oliver MD  •  simethicone (MYLICON) chewable tablet 80 mg, 80 mg, Oral, 4x Daily PRN, Jarad Oliver MD, 80 mg at 11/08/17 2142     Diagnostic Data    Lab Results (last 24 hours)     Procedure Component Value Units Date/Time    CBC Auto Differential [[id number]]  (Abnormal) Collected:  11/09/17 0500    Specimen:  Blood Updated:  11/09/17 0644     WBC 7.22 10*3/mm3      RBC 2.91 (L) 10*6/mm3      Hemoglobin 7.0 (L) g/dL       Test repeated to confirm results.         Hematocrit 22.2 (L) %      MCV 76.3 (L) fL      MCH 24.1 (L) pg      MCHC 31.5 g/dL      RDW 15.4 (H) %      RDW-SD 43.3 fl      MPV 9.8 fL      Platelets 167 10*3/mm3      Neutrophil % 65.0 %      Lymphocyte % 23.4 %      Monocyte % 10.4 %      Eosinophil % 0.3 %      Basophil % 0.1 %      Immature Grans % 0.8 (H) %      Neutrophils, Absolute 4.69 10*3/mm3      Lymphocytes, Absolute 1.69 10*3/mm3      Monocytes, Absolute 0.75 10*3/mm3      Eosinophils, Absolute 0.02 10*3/mm3      Basophils, Absolute 0.01 10*3/mm3      Immature Grans, Absolute 0.06 (H) 10*3/mm3     CBC & Differential [[id number]] Collected:  11/09/17  0500    Specimen:  Blood Updated:  11/09/17 0707    Narrative:       The following orders were created for panel order CBC & Differential.  Procedure                               Abnormality         Status                     ---------                               -----------         ------                     Scan Slide[[id number]]                                       Final result               CBC Auto Differential[[id number]]        Abnormal            Final result                 Please view results for these tests on the individual orders.    Scan Slide [[id number]] Collected:  11/09/17 0500    Specimen:  Blood Updated:  11/09/17 0707     Anisocytosis Slight/1+     Dacrocytes Slight/1+     Ovalocytes Slight/1+     Poikilocytes Slight/1+     Polychromasia Slight/1+     WBC Morphology Normal     Platelet Estimate Adequate    Tissue Pathology Exam - Tissue, Placenta [[id number]] Collected:  11/08/17 0759    Specimen:  Tissue from Fallopian Tube, Left; Tissue from Fallopian Tube, Right Updated:  11/09/17 0958     Case Report --     Surgical Pathology Report                         Case: [id number]                                  Authorizing Provider:  Jarad Oliver MD   Collected:           11/08/2017 07:59 AM          Ordering Location:     [facility]             Received:            11/08/2017 10:32 AM                                 [facility]                                                                                  DELIVERY                                                                     Pathologist:           Coy Hardy MD                                                         Specimens:   1) - Fallopian Tube, Left                                                                           2) - Fallopian Tube, Right                                                                  Final Diagnosis --     SEGMENTS OF FALLOPIAN TUBES (2).       Gross Description --     1.  The  first specimen consists of a segment of left fallopian tube measuring 1.0 x 0.6 cm.  Its external surface as well as cut surface are grossly unremarkable.  Bisected and all embedded as 1A.    2.  The second specimen consists of a segment of right fallopian tube measuring 1.0 x 0.6 cm.  All embedded as 2A.       Embedded Images --          I reviewed the patient's new clinical results.    Assessment/Plan:     Tanja Brice 22 y.o.  POD#1 s/p primary LTCS & BTL doing well.   1. Encourage ambulation  2. Continue routine postop care.  3. AM H&H    Plan for disposition: Discharge home on POD#2 to POD#4    Signature  Saroj Hilario Jr., M.D.  PGY1  Family Practice Resident  [address] [place] [address]  Phone: [phone]  Fax: [phone]        This document has been electronically signed by Saroj Hilario Jr, MD on 2017 2:56 PM

## 2017-11-09 NOTE — PLAN OF CARE
Problem: Patient Care Overview (Adult)  Goal: Plan of Care Review  Outcome: Ongoing (interventions implemented as appropriate)    17 1723   Coping/Psychosocial Response Interventions   Plan Of Care Reviewed With patient   Patient Care Overview   Progress improving   Outcome Evaluation   Outcome Summary/Follow up Plan VSS, ambulating in hallways, pain well managed with oral pain medication, breastfeeding well.        Goal: Adult Individualization and Mutuality  Outcome: Ongoing (interventions implemented as appropriate)  Goal: Discharge Needs Assessment  Outcome: Ongoing (interventions implemented as appropriate)    Problem: Postpartum ( Delivery) (Adult)  Goal: Signs and Symptoms of Listed Potential Problems Will be Absent or Manageable (Postpartum)  Outcome: Ongoing (interventions implemented as appropriate)    Problem: Breastfeeding (Adult,NICU,,Obstetrics,Pediatric)  Goal: Signs and Symptoms of Listed Potential Problems Will be Absent or Manageable (Breastfeeding)  Outcome: Ongoing (interventions implemented as appropriate)

## 2017-11-09 NOTE — MEDICAL STUDENT
POSTPARTUM PROGRESS NOTE  NAME: Tanja Brice  : 1995  MRN: [id number]      LOS: 1 day     Chief Complaint: No complaints    Subjective:     Interval History:  Pain well controlled with medications, (+) Flatus, - BM, lochia minimal, (+) voiding, (+) ambulation.  Had bilateral tubal ligation for birth control. Breastfeeding.      Review of Systems:   GEN: No fever/chills, no dizziness or lightheadedness  CVS: No chest pain, no palpitations  RESP: No shortness of breath  GI: No nausea or vomiting    Objective:     Vital Signs  Temp:  [97.6 °F (36.4 °C)-98.7 °F (37.1 °C)] 98.2 °F (36.8 °C)  Heart Rate:  [] 84  Resp:  [18-20] 18  BP: (108-131)/(56-75) 109/69    Physical Exam  GEN: A&O x3, NAD  CVS: RRR, S1/S2, no m/g/r  LUNGS: CTAB, no wheezes, no rhonchi  ABD: Soft, Nontender  Fundus: firm below umbilicus  EXT: no edema, no calf tenderness bilaterally.    Medication Review    Current Facility-Administered Medications:   •  bisacodyl (DULCOLAX) suppository 10 mg, 10 mg, Rectal, Daily PRN, Jarad Oliver MD  •  diphenhydrAMINE (BENADRYL) capsule 25 mg, 25 mg, Oral, Q4H PRN **OR** diphenhydrAMINE (BENADRYL) injection 25 mg, 25 mg, Intravenous, Q4H PRN **OR** diphenhydrAMINE (BENADRYL) injection 25 mg, 25 mg, Intramuscular, Q4H PRN, Benedict Nieto CRNA  •  docusate sodium (COLACE) capsule 100 mg, 100 mg, Oral, BID PRN, Jarad Oliver MD, 100 mg at 17  •  ferrous sulfate EC tablet 324 mg, 324 mg, Oral, TID With Meals, Jarad Oliver MD, 324 mg at 17  •  ibuprofen (ADVIL,MOTRIN) tablet 800 mg, 800 mg, Oral, Q8H, Jarad Oliver MD, 800 mg at 17 0550  •  magnesium hydroxide (MILK OF MAGNESIA) suspension 2400 mg/10mL 10 mL, 10 mL, Oral, Daily PRN, Jarad Oliver MD  •  Morphine sulfate PCA 1 mg/mL 30 mL syringe, , Intravenous, Continuous, Jarad Oliver MD  •  naloxone (NARCAN) injection 0.1 mg, 0.1 mg, Intravenous, Q5 Min PRN,  Jarad Oliver MD  •  naloxone (NARCAN) injection 0.2 mg, 0.2 mg, Intravenous, Q15 Min PRN, Benedict Nieto CRNA  •  ondansetron (ZOFRAN) injection 4 mg, 4 mg, Intravenous, Once PRN, Benedict Nieto CRNA  •  ondansetron (ZOFRAN) tablet 4 mg, 4 mg, Oral, Q8H PRN, Jarad Oliver MD  •  oxyCODONE-acetaminophen (PERCOCET) 5-325 MG per tablet 1 tablet, 1 tablet, Oral, Q4H PRN, Jarad Oliver MD  •  oxyCODONE-acetaminophen (PERCOCET) 5-325 MG per tablet 2 tablet, 2 tablet, Oral, Q4H PRN, Jarad Oliver MD, 2 tablet at 17 0349  •  prenatal vitamin 27-0.8 tablet 1 tablet, 1 tablet, Oral, Daily, Jarad Oliver MD, 1 tablet at 17 1303  •  promethazine (PHENERGAN) tablet 12.5 mg, 12.5 mg, Oral, Q4H PRN, Jarad Oliver MD  •  simethicone (MYLICON) chewable tablet 80 mg, 80 mg, Oral, 4x Daily PRN, Jarad Oliver MD, 80 mg at 17 2142     Diagnostic Data    Lab Results (last 24 hours)     Procedure Component Value Units Date/Time    Tissue Pathology Exam - Tissue, Placenta [[id number]] Collected:  17 0759    Specimen:  Tissue from Fallopian Tube, Left; Tissue from Fallopian Tube, Right Updated:  17 1116    CBC & Differential [[id number]] Collected:  17 0500    Specimen:  Blood Updated:  17    Narrative:       The following orders were created for panel order CBC & Differential.  Procedure                               Abnormality         Status                     ---------                               -----------         ------                     CBC Auto Differential[[id number]]                            In process                   Please view results for these tests on the individual orders.    CBC Auto Differential [[id number]] Collected:  17 0500    Specimen:  Blood Updated:  11/09/17 0539          I reviewed the patient's new clinical results.    Assessment/Plan:     Tanja Brice 22 y.o.  POD#1 s/p  primary LTCS and BTL no concerns  1. Encourage ambulation  2. Continue routine postpartum care.    Plan for disposition: Discharge home on POD#2 to #4    Signature        This document has been electronically signed by Sanford Prather on November 9, 2017 6:25 AM

## 2017-11-09 NOTE — PLAN OF CARE
Problem: Patient Care Overview (Adult)  Goal: Plan of Care Review  Outcome: Ongoing (interventions implemented as appropriate)    17 0257   Coping/Psychosocial Response Interventions   Plan Of Care Reviewed With patient   Patient Care Overview   Progress improving   Outcome Evaluation   Outcome Summary/Follow up Plan vss, ambulating in room, voiding, pain well managed with prn meds, s/l, exclusively breastfeeding well. Educated on hand pump use when infant was not eating well.        Goal: Adult Individualization and Mutuality  Outcome: Ongoing (interventions implemented as appropriate)  Goal: Discharge Needs Assessment  Outcome: Ongoing (interventions implemented as appropriate)    Problem: Postpartum ( Delivery) (Adult)  Goal: Signs and Symptoms of Listed Potential Problems Will be Absent or Manageable (Postpartum)  Outcome: Ongoing (interventions implemented as appropriate)    Problem: Breastfeeding (Adult,NICU,,Obstetrics,Pediatric)  Goal: Signs and Symptoms of Listed Potential Problems Will be Absent or Manageable (Breastfeeding)  Outcome: Ongoing (interventions implemented as appropriate)

## 2017-11-10 LAB
DEPRECATED RDW RBC AUTO: 44.7 FL (ref 36.4–46.3)
ERYTHROCYTE [DISTWIDTH] IN BLOOD BY AUTOMATED COUNT: 15.7 % (ref 11.5–14.5)
HCT VFR BLD AUTO: 21.2 % (ref 35–45)
HGB BLD-MCNC: 6.5 G/DL (ref 12–15.5)
HOLD SPECIMEN: NORMAL
MCH RBC QN AUTO: 24 PG (ref 26.5–34)
MCHC RBC AUTO-ENTMCNC: 30.7 G/DL (ref 31.4–36)
MCV RBC AUTO: 78.2 FL (ref 80–98)
PLATELET # BLD AUTO: 201 10*3/MM3 (ref 150–450)
PMV BLD AUTO: 8.8 FL (ref 8–12)
RBC # BLD AUTO: 2.71 10*6/MM3 (ref 3.77–5.16)
WBC NRBC COR # BLD: 6.2 10*3/MM3 (ref 3.2–9.8)

## 2017-11-10 PROCEDURE — 36430 TRANSFUSION BLD/BLD COMPNT: CPT

## 2017-11-10 PROCEDURE — 86900 BLOOD TYPING SEROLOGIC ABO: CPT

## 2017-11-10 PROCEDURE — 63710000001 DIPHENHYDRAMINE PER 50 MG: Performed by: OBSTETRICS & GYNECOLOGY

## 2017-11-10 PROCEDURE — P9016 RBC LEUKOCYTES REDUCED: HCPCS

## 2017-11-10 PROCEDURE — 85027 COMPLETE CBC AUTOMATED: CPT | Performed by: OBSTETRICS & GYNECOLOGY

## 2017-11-10 RX ORDER — SODIUM CHLORIDE 0.9 % (FLUSH) 0.9 %
SYRINGE (ML) INJECTION
Status: DISPENSED
Start: 2017-11-10 | End: 2017-11-10

## 2017-11-10 RX ORDER — DIPHENHYDRAMINE HCL 25 MG
25 CAPSULE ORAL ONCE
Status: COMPLETED | OUTPATIENT
Start: 2017-11-10 | End: 2017-11-10

## 2017-11-10 RX ORDER — ACETAMINOPHEN 325 MG/1
650 TABLET ORAL ONCE
Status: COMPLETED | OUTPATIENT
Start: 2017-11-10 | End: 2017-11-10

## 2017-11-10 RX ADMIN — IBUPROFEN 800 MG: 800 TABLET ORAL at 13:25

## 2017-11-10 RX ADMIN — MAGNESIUM HYDROXIDE 10 ML: 2400 SUSPENSION ORAL at 21:59

## 2017-11-10 RX ADMIN — ACETAMINOPHEN 650 MG: 325 TABLET ORAL at 09:09

## 2017-11-10 RX ADMIN — OXYCODONE HYDROCHLORIDE AND ACETAMINOPHEN 2 TABLET: 5; 325 TABLET ORAL at 14:56

## 2017-11-10 RX ADMIN — OXYCODONE HYDROCHLORIDE AND ACETAMINOPHEN 2 TABLET: 5; 325 TABLET ORAL at 01:14

## 2017-11-10 RX ADMIN — DOCUSATE SODIUM 100 MG: 100 CAPSULE, LIQUID FILLED ORAL at 15:49

## 2017-11-10 RX ADMIN — IBUPROFEN 800 MG: 800 TABLET ORAL at 05:14

## 2017-11-10 RX ADMIN — FERROUS SULFATE TAB EC 324 MG (65 MG FE EQUIVALENT) 324 MG: 324 (65 FE) TABLET DELAYED RESPONSE at 17:15

## 2017-11-10 RX ADMIN — FERROUS SULFATE TAB EC 324 MG (65 MG FE EQUIVALENT) 324 MG: 324 (65 FE) TABLET DELAYED RESPONSE at 12:04

## 2017-11-10 RX ADMIN — PRENATAL VIT W/ FE FUMARATE-FA TAB 27-0.8 MG 1 TABLET: 27-0.8 TAB at 09:06

## 2017-11-10 RX ADMIN — IBUPROFEN 800 MG: 800 TABLET ORAL at 21:53

## 2017-11-10 RX ADMIN — DIPHENHYDRAMINE HYDROCHLORIDE 25 MG: 25 CAPSULE ORAL at 09:10

## 2017-11-10 RX ADMIN — FERROUS SULFATE TAB EC 324 MG (65 MG FE EQUIVALENT) 324 MG: 324 (65 FE) TABLET DELAYED RESPONSE at 09:06

## 2017-11-10 NOTE — PROGRESS NOTES
POSTPARTUM PROGRESS NOTE  NAME: Tanja Brice  : 1995  MRN: [id number]      LOS: 2 days     Chief Complaint: Abdominal discomfort    Subjective:     Interval History:  Pain well controlled with medications, (+) Flatus, - BM, lochia minimal, (+) voiding, (+) ambulation.  Bilateral tubal ligation for birth control. Breastfeeding.     Review of Systems:   GEN: No fever/chills, no dizziness or lightheadedness  CVS: No chest pain, no palpitations  RESP: No shortness of breath  GI: No nausea or vomiting    Objective:     Vital Signs  Temp:  [98.1 °F (36.7 °C)-99 °F (37.2 °C)] 98.3 °F (36.8 °C)  Heart Rate:  [] 85  Resp:  [16-18] 18  BP: (115-145)/(65-86) 127/77    Physical Exam  GEN: A&O x3, NAD  CVS: RRR, S1/S2, no m/g/r  LUNGS: CTAB, no wheezes, no rhonchi  ABD: Soft, Nontender  Fundus: firm below umbilicus. No shifting dullness appreciated  EXT: no edema, no calf tenderness bilaterally.    Medication Review    Current Facility-Administered Medications:   •  bisacodyl (DULCOLAX) suppository 10 mg, 10 mg, Rectal, Daily PRN, Jarad Oliver MD  •  diphenhydrAMINE (BENADRYL) capsule 25 mg, 25 mg, Oral, Q4H PRN **OR** diphenhydrAMINE (BENADRYL) injection 25 mg, 25 mg, Intravenous, Q4H PRN **OR** diphenhydrAMINE (BENADRYL) injection 25 mg, 25 mg, Intramuscular, Q4H PRN, Benedict Nieto CRNA  •  docusate sodium (COLACE) capsule 100 mg, 100 mg, Oral, BID PRN, Jarad Oliver MD, 100 mg at 173  •  ferrous sulfate EC tablet 324 mg, 324 mg, Oral, TID With Meals, Jarad Oliver MD, 324 mg at 11/10/17 1204  •  ibuprofen (ADVIL,MOTRIN) tablet 800 mg, 800 mg, Oral, Q8H, Jarad Oliver MD, 800 mg at 11/10/17 1325  •  magnesium hydroxide (MILK OF MAGNESIA) suspension 2400 mg/10mL 10 mL, 10 mL, Oral, Daily PRN, Jarad Oliver MD  •  Morphine sulfate PCA 1 mg/mL 30 mL syringe, , Intravenous, Continuous, Jarad Oliver MD  •  naloxone (NARCAN) injection 0.1 mg,  0.1 mg, Intravenous, Q5 Min PRN, Jarad Oliver MD  •  naloxone (NARCAN) injection 0.2 mg, 0.2 mg, Intravenous, Q15 Min PRN, Benedict Nieto CRNA  •  ondansetron (ZOFRAN) tablet 4 mg, 4 mg, Oral, Q8H PRN, Jarad Oliver MD  •  oxyCODONE-acetaminophen (PERCOCET) 5-325 MG per tablet 1 tablet, 1 tablet, Oral, Q4H PRN, Jarad Oliver MD  •  oxyCODONE-acetaminophen (PERCOCET) 5-325 MG per tablet 2 tablet, 2 tablet, Oral, Q4H PRN, Jarad Oliver MD, 2 tablet at 11/10/17 0114  •  prenatal vitamin 27-0.8 tablet 1 tablet, 1 tablet, Oral, Daily, Jarad Oliver MD, 1 tablet at 11/10/17 0906  •  promethazine (PHENERGAN) tablet 12.5 mg, 12.5 mg, Oral, Q4H PRN, Jaard Oliver MD  •  simethicone (MYLICON) chewable tablet 80 mg, 80 mg, Oral, 4x Daily PRN, Jarad Oliver MD, 80 mg at 11/08/17 2142  •  sodium chloride 0.9 % flush  - ADS Override Pull, , , ,      Diagnostic Data    Lab Results (last 24 hours)     Procedure Component Value Units Date/Time    Extra Tubes [[id number]] Collected:  11/10/17 0459    Specimen:  Blood from Blood, Venous Line Updated:  11/10/17 0601    Narrative:       The following orders were created for panel order Extra Tubes.  Procedure                               Abnormality         Status                     ---------                               -----------         ------                     Gold [facility] - [facility][[id number]]                                   Final result                 Please view results for these tests on the individual orders.    Gold Top - [facility] [[id number]] Collected:  11/10/17 0459    Specimen:  Blood Updated:  11/10/17 0601     Extra Tube Hold for add-ons.      Auto resulted.       CBC (No Diff) [[id number]]  (Abnormal) Collected:  11/10/17 0459    Specimen:  Blood Updated:  11/10/17 0607     WBC 6.20 10*3/mm3      RBC 2.71 (L) 10*6/mm3      Hemoglobin 6.5 (L) g/dL      Hematocrit 21.2 (L) %      MCV 78.2 (L) fL      MCH 24.0 (L) pg       MCHC 30.7 (L) g/dL      RDW 15.7 (H) %      RDW-SD 44.7 fl      MPV 8.8 fL      Platelets 201 10*3/mm3           I reviewed the patient's new clinical results.    Assessment/Plan:     Tanja Brice 22 y.o.  POD# 2 s/p primary LTCS and BTL   1. Encourage ambulation  2. Continue routine postpartum care.  3. Monitor for post-transfusion reactions    Plan for disposition: Discharge home following H&H stabilization    Signature        This document has been electronically signed by Sanford Prather on November 10, 2017 1:31 PM

## 2017-11-10 NOTE — PROGRESS NOTES
NAME: Tanja Brice  : 1995  MRN: [id number]       LOS: 2 days      Chief Complaint: Abdominal discomfort     Subjective:      Interval History:  Pain well controlled with medications, (+) Flatus, - BM, lochia minimal, (+) voiding, (+) ambulation. Bilateral tubal ligation for birth control. Breastfeeding.      H&H 9.5/30.5 antepartum & 6.5/21.2 today. Denies fatigue, weakness, shortness of breath.        Review of Systems:   GEN: No fever/chills, no dizziness or lightheadedness  CVS: No chest pain, no palpitations  RESP: No shortness of breath  GI: No nausea or vomiting     Objective:      Vital Signs  Temp:  [97.7 °F (36.5 °C)-98.8 °F (37.1 °C)] 98.2 °F (36.8 °C)  Heart Rate:  [] 83  Resp:  [16-18] 18  BP: (115-145)/(68-86) 145/86     Physical Exam  GEN: A&O x3, NAD  CVS: RRR, S1/S2, no m/g/r  LUNGS: CTAB, no wheezes, no rhonchi  ABD: Soft, Nontender  Fundus: firm below umbilicus  EXT: no edema, no calf tenderness bilaterally.     Medication Review     Current Facility-Administered Medications:   •  bisacodyl (DULCOLAX) suppository 10 mg, 10 mg, Rectal, Daily PRN, Jarad Oliver MD  •  diphenhydrAMINE (BENADRYL) capsule 25 mg, 25 mg, Oral, Q4H PRN **OR** diphenhydrAMINE (BENADRYL) injection 25 mg, 25 mg, Intravenous, Q4H PRN **OR** diphenhydrAMINE (BENADRYL) injection 25 mg, 25 mg, Intramuscular, Q4H PRN, Benedict Nieto CRNA  •  docusate sodium (COLACE) capsule 100 mg, 100 mg, Oral, BID PRN, Jarad Oliver MD, 100 mg at 17  •  ferrous sulfate EC tablet 324 mg, 324 mg, Oral, TID With Meals, Jarad Oliver MD, 324 mg at 17 1715  •  ibuprofen (ADVIL,MOTRIN) tablet 800 mg, 800 mg, Oral, Q8H, Jarad Oliver MD, 800 mg at 11/10/17 0514  •  magnesium hydroxide (MILK OF MAGNESIA) suspension 2400 mg/10mL 10 mL, 10 mL, Oral, Daily PRN, Jarad Oliver MD  •  Morphine sulfate PCA 1 mg/mL 30 mL syringe, , Intravenous, Continuous, Jarad Oliver,  MD  •  naloxone (NARCAN) injection 0.1 mg, 0.1 mg, Intravenous, Q5 Min PRN, Jarad Oliver MD  •  naloxone (NARCAN) injection 0.2 mg, 0.2 mg, Intravenous, Q15 Min PRN, Benedict Nieto CRNA  •  ondansetron (ZOFRAN) tablet 4 mg, 4 mg, Oral, Q8H PRN, Jarad Oliver MD  •  oxyCODONE-acetaminophen (PERCOCET) 5-325 MG per tablet 1 tablet, 1 tablet, Oral, Q4H PRN, Jarad Oliver MD  •  oxyCODONE-acetaminophen (PERCOCET) 5-325 MG per tablet 2 tablet, 2 tablet, Oral, Q4H PRN, Jarad Oliver MD, 2 tablet at 11/10/17 0114  •  prenatal vitamin 27-0.8 tablet 1 tablet, 1 tablet, Oral, Daily, Jarad Oliver MD, 1 tablet at 11/09/17 0823  •  promethazine (PHENERGAN) tablet 12.5 mg, 12.5 mg, Oral, Q4H PRN, Jarad Oliver MD  •  simethicone (MYLICON) chewable tablet 80 mg, 80 mg, Oral, 4x Daily PRN, Jarad Oliver MD, 80 mg at 11/08/17 2142                          Diagnostic Data    Lab Results (last 24 hours)                Procedure Component Value Units Date/Time                 CBC Auto Differential [[id number]]  (Abnormal) Collected:  11/09/17 0500      Specimen:  Blood Updated:  11/09/17 0644        WBC 7.22 10*3/mm3          RBC 2.91 (L) 10*6/mm3          Hemoglobin 7.0 (L) g/dL            Test repeated to confirm results.             Hematocrit 22.2 (L) %          MCV 76.3 (L) fL          MCH 24.1 (L) pg          MCHC 31.5 g/dL          RDW 15.4 (H) %          RDW-SD 43.3 fl          MPV 9.8 fL          Platelets 167 10*3/mm3          Neutrophil % 65.0 %          Lymphocyte % 23.4 %          Monocyte % 10.4 %          Eosinophil % 0.3 %          Basophil % 0.1 %          Immature Grans % 0.8 (H) %          Neutrophils, Absolute 4.69 10*3/mm3          Lymphocytes, Absolute 1.69 10*3/mm3          Monocytes, Absolute 0.75 10*3/mm3          Eosinophils, Absolute 0.02 10*3/mm3          Basophils, Absolute 0.01 10*3/mm3          Immature Grans, Absolute 0.06 (H) 10*3/mm3        CBC  & Differential [[id number]] Collected:  11/09/17 0500      Specimen:  Blood Updated:  11/09/17 0707      Narrative:         The following orders were created for panel order CBC & Differential.  Procedure                               Abnormality         Status                     ---------                               -----------         ------                     Scan Slide[[id number]]                                       Final result               CBC Auto Differential[[id number]]        Abnormal            Final result                  Please view results for these tests on the individual orders.      Scan Slide [[id number]] Collected:  11/09/17 0500      Specimen:  Blood Updated:  11/09/17 0707        Anisocytosis Slight/1+        Dacrocytes Slight/1+        Ovalocytes Slight/1+        Poikilocytes Slight/1+        Polychromasia Slight/1+        WBC Morphology Normal        Platelet Estimate Adequate      Tissue Pathology Exam - Tissue, Placenta [[id number]] Collected:  11/08/17 0759      Specimen:  Tissue from Fallopian Tube, Left; Tissue from Fallopian Tube, Right Updated:  11/09/17 0958        Case Report --        Surgical Pathology Report                         Case: [id number]                                  Authorizing Provider:  Jarad Oliver MD   Collected:           11/08/2017 07:59 AM          Ordering Location:     [facility]             Received:            11/08/2017 10:32 AM                                 [facility]                                                                                  DEL[facility]                                                                     Pathologist:           Coy Hardy MD                                                         Specimens:   1) - Fallopian Tube, Left                                                                           2) - Fallopian Tube, Right                                                                   Final  Diagnosis --        SEGMENTS OF FALLOPIAN TUBES (2).       Gross Description --        1.  The first specimen consists of a segment of left fallopian tube measuring 1.0 x 0.6 cm.  Its external surface as well as cut surface are grossly unremarkable.  Bisected and all embedded as 1A.     2.  The second specimen consists of a segment of right fallopian tube measuring 1.0 x 0.6 cm.  All embedded as 2A.       Embedded Images --      Extra Tubes [[id number]] Collected:  11/10/17 0459      Specimen:  Blood from Blood, Venous Line Updated:  11/10/17 0601      Narrative:         The following orders were created for panel order Extra Tubes.  Procedure                               Abnormality         Status                     ---------                               -----------         ------                     Gold Top - [facility][[id number]]                                   Final result                  Please view results for these tests on the individual orders.      Gold Top - [facility] [[id number]] Collected:  11/10/17 0459      Specimen:  Blood Updated:  11/10/17 0601        Extra Tube Hold for add-ons.          Auto resulted.          CBC (No Diff) [[id number]]  (Abnormal) Collected:  11/10/17 0459      Specimen:  Blood Updated:  11/10/17 0607        WBC 6.20 10*3/mm3          RBC 2.71 (L) 10*6/mm3          Hemoglobin 6.5 (L) g/dL          Hematocrit 21.2 (L) %          MCV 78.2 (L) fL          MCH 24.0 (L) pg          MCHC 30.7 (L) g/dL          RDW 15.7 (H) %          RDW-SD 44.7 fl          MPV 8.8 fL          Platelets 201 10*3/mm3               I reviewed the patient's new clinical results.     Assessment/Plan:      Tanja Brice 22 y.o.  POD# 2 s/p primary LTCS and BTL no issue  1. Encourage ambulation  2. Continue routine postpartum care.  3. Continue to monitor H&H     Plan for disposition: Discharge home POD#3 - POD#4 when stable.       This document has been electronically signed by Saroj Hilario Jr, MD  on November 10, 2017 7:07 AM

## 2017-11-10 NOTE — MEDICAL STUDENT
POSTPARTUM PROGRESS NOTE  NAME: Tanja Brice  : 1995  MRN: [id number]      LOS: 2 days     Chief Complaint: Abdominal discomfort    Subjective:     Interval History:  Pain well controlled with medications, (+) Flatus, - BM, lochia minimal, (+) voiding, (+) ambulation. Bilateral tubal ligation for birth control. Breastfeeding.     H&H 9.5/30.5 antepartum & 6.5/21.2 today. Denies fatigue, weakness, shortness of breath. Consider transfusion and close monitoring    Review of Systems:   GEN: No fever/chills, no dizziness or lightheadedness  CVS: No chest pain, no palpitations  RESP: No shortness of breath  GI: No nausea or vomiting    Objective:     Vital Signs  Temp:  [97.7 °F (36.5 °C)-98.8 °F (37.1 °C)] 98.2 °F (36.8 °C)  Heart Rate:  [] 83  Resp:  [16-18] 18  BP: (115-145)/(68-86) 145/86    Physical Exam  GEN: A&O x3, NAD  CVS: RRR, S1/S2, no m/g/r  LUNGS: CTAB, no wheezes, no rhonchi  ABD: Soft, Nontender  Fundus: firm below umbilicus  EXT: no edema, no calf tenderness bilaterally.    Medication Review    Current Facility-Administered Medications:   •  bisacodyl (DULCOLAX) suppository 10 mg, 10 mg, Rectal, Daily PRN, Jarad Oliver MD  •  diphenhydrAMINE (BENADRYL) capsule 25 mg, 25 mg, Oral, Q4H PRN **OR** diphenhydrAMINE (BENADRYL) injection 25 mg, 25 mg, Intravenous, Q4H PRN **OR** diphenhydrAMINE (BENADRYL) injection 25 mg, 25 mg, Intramuscular, Q4H PRN, Benedict Nieto CRNA  •  docusate sodium (COLACE) capsule 100 mg, 100 mg, Oral, BID PRN, Jarad Oliver MD, 100 mg at 17 1233  •  ferrous sulfate EC tablet 324 mg, 324 mg, Oral, TID With Meals, Jarad Oliver MD, 324 mg at 17 1715  •  ibuprofen (ADVIL,MOTRIN) tablet 800 mg, 800 mg, Oral, Q8H, Jarad Oliver MD, 800 mg at 11/10/17 0514  •  magnesium hydroxide (MILK OF MAGNESIA) suspension 2400 mg/10mL 10 mL, 10 mL, Oral, Daily PRN, Jarad Oliver MD  •  Morphine sulfate PCA 1 mg/mL 30 mL  syringe, , Intravenous, Continuous, Jarad Oliver MD  •  naloxone (NARCAN) injection 0.1 mg, 0.1 mg, Intravenous, Q5 Min PRN, Jarad Oilver MD  •  naloxone (NARCAN) injection 0.2 mg, 0.2 mg, Intravenous, Q15 Min PRN, Benedict Nieto CRNA  •  ondansetron (ZOFRAN) tablet 4 mg, 4 mg, Oral, Q8H PRN, Jarad Oliver MD  •  oxyCODONE-acetaminophen (PERCOCET) 5-325 MG per tablet 1 tablet, 1 tablet, Oral, Q4H PRN, Jarad Oliver MD  •  oxyCODONE-acetaminophen (PERCOCET) 5-325 MG per tablet 2 tablet, 2 tablet, Oral, Q4H PRN, Jarad Oliver MD, 2 tablet at 11/10/17 0114  •  prenatal vitamin 27-0.8 tablet 1 tablet, 1 tablet, Oral, Daily, Jarad Oliver MD, 1 tablet at 11/09/17 0823  •  promethazine (PHENERGAN) tablet 12.5 mg, 12.5 mg, Oral, Q4H PRN, Jarad Oliver MD  •  simethicone (MYLICON) chewable tablet 80 mg, 80 mg, Oral, 4x Daily PRN, Jarad Oliver MD, 80 mg at 11/08/17 2142     Diagnostic Data    Lab Results (last 24 hours)     Procedure Component Value Units Date/Time    CBC Auto Differential [[id number]]  (Abnormal) Collected:  11/09/17 0500    Specimen:  Blood Updated:  11/09/17 0644     WBC 7.22 10*3/mm3      RBC 2.91 (L) 10*6/mm3      Hemoglobin 7.0 (L) g/dL       Test repeated to confirm results.         Hematocrit 22.2 (L) %      MCV 76.3 (L) fL      MCH 24.1 (L) pg      MCHC 31.5 g/dL      RDW 15.4 (H) %      RDW-SD 43.3 fl      MPV 9.8 fL      Platelets 167 10*3/mm3      Neutrophil % 65.0 %      Lymphocyte % 23.4 %      Monocyte % 10.4 %      Eosinophil % 0.3 %      Basophil % 0.1 %      Immature Grans % 0.8 (H) %      Neutrophils, Absolute 4.69 10*3/mm3      Lymphocytes, Absolute 1.69 10*3/mm3      Monocytes, Absolute 0.75 10*3/mm3      Eosinophils, Absolute 0.02 10*3/mm3      Basophils, Absolute 0.01 10*3/mm3      Immature Grans, Absolute 0.06 (H) 10*3/mm3     CBC & Differential [[id number]] Collected:  11/09/17 0500    Specimen:  Blood Updated:   11/09/17 0707    Narrative:       The following orders were created for panel order CBC & Differential.  Procedure                               Abnormality         Status                     ---------                               -----------         ------                     Scan Slide[[id number]]                                       Final result               CBC Auto Differential[[id number]]        Abnormal            Final result                 Please view results for these tests on the individual orders.    Scan Slide [[id number]] Collected:  11/09/17 0500    Specimen:  Blood Updated:  11/09/17 0707     Anisocytosis Slight/1+     Dacrocytes Slight/1+     Ovalocytes Slight/1+     Poikilocytes Slight/1+     Polychromasia Slight/1+     WBC Morphology Normal     Platelet Estimate Adequate    Tissue Pathology Exam - Tissue, Placenta [[id number]] Collected:  11/08/17 0759    Specimen:  Tissue from Fallopian Tube, Left; Tissue from Fallopian Tube, Right Updated:  11/09/17 0958     Case Report --     Surgical Pathology Report                         Case: [id number]                                  Authorizing Provider:  Jarad Oliver MD   Collected:           11/08/2017 07:59 AM          Ordering Location:     [facility]             Received:            11/08/2017 10:32 AM                                 [facility]                                                                                  DELIVERY                                                                     Pathologist:           Coy Hardy MD                                                         Specimens:   1) - Fallopian Tube, Left                                                                           2) - Fallopian Tube, Right                                                                  Final Diagnosis --     SEGMENTS OF FALLOPIAN TUBES (2).       Gross Description --     1.  The first specimen consists of a segment  of left fallopian tube measuring 1.0 x 0.6 cm.  Its external surface as well as cut surface are grossly unremarkable.  Bisected and all embedded as 1A.    2.  The second specimen consists of a segment of right fallopian tube measuring 1.0 x 0.6 cm.  All embedded as 2A.       Embedded Images --    Extra Tubes [[id number]] Collected:  11/10/17 0459    Specimen:  Blood from Blood, Venous Line Updated:  11/10/17 0601    Narrative:       The following orders were created for panel order Extra Tubes.  Procedure                               Abnormality         Status                     ---------                               -----------         ------                     Gold Top - SST[[id number]]                                   Final result                 Please view results for these tests on the individual orders.    Gold Top - SST [[id number]] Collected:  11/10/17 0459    Specimen:  Blood Updated:  11/10/17 0601     Extra Tube Hold for add-ons.      Auto resulted.       CBC (No Diff) [[id number]]  (Abnormal) Collected:  11/10/17 0459    Specimen:  Blood Updated:  11/10/17 0607     WBC 6.20 10*3/mm3      RBC 2.71 (L) 10*6/mm3      Hemoglobin 6.5 (L) g/dL      Hematocrit 21.2 (L) %      MCV 78.2 (L) fL      MCH 24.0 (L) pg      MCHC 30.7 (L) g/dL      RDW 15.7 (H) %      RDW-SD 44.7 fl      MPV 8.8 fL      Platelets 201 10*3/mm3           I reviewed the patient's new clinical results.    Assessment/Plan:     Tanja Brice 22 y.o.  POD# 2 s/p primary LTCS and BTL no issue  1. Encourage ambulation  2. Continue routine postpartum care.  3. Consider transfusion of one unit pRBC with successive H&H    Plan for disposition: Discharge home when H&H stabilized    Signature        This document has been electronically signed by Sanford Prather on November 10, 2017 6:35 AM

## 2017-11-10 NOTE — PLAN OF CARE
Problem: Patient Care Overview (Adult)  Goal: Plan of Care Review  Outcome: Ongoing (interventions implemented as appropriate)    11/10/17 0436   Coping/Psychosocial Response Interventions   Plan Of Care Reviewed With patient   Patient Care Overview   Progress improving   Outcome Evaluation   Outcome Summary/Follow up Plan pain controlled, ambulating, dressing removed, breastfeeding well       Goal: Adult Individualization and Mutuality  Outcome: Ongoing (interventions implemented as appropriate)  Goal: Discharge Needs Assessment  Outcome: Ongoing (interventions implemented as appropriate)    Problem: Postpartum ( Delivery) (Adult)  Goal: Signs and Symptoms of Listed Potential Problems Will be Absent or Manageable (Postpartum)  Outcome: Ongoing (interventions implemented as appropriate)    Problem: Breastfeeding (Adult,NICU,,Obstetrics,Pediatric)  Goal: Signs and Symptoms of Listed Potential Problems Will be Absent or Manageable (Breastfeeding)  Outcome: Ongoing (interventions implemented as appropriate)

## 2017-11-11 VITALS
DIASTOLIC BLOOD PRESSURE: 72 MMHG | WEIGHT: 219 LBS | HEIGHT: 63 IN | BODY MASS INDEX: 38.8 KG/M2 | RESPIRATION RATE: 20 BRPM | OXYGEN SATURATION: 99 % | TEMPERATURE: 98.3 F | SYSTOLIC BLOOD PRESSURE: 127 MMHG | HEART RATE: 95 BPM

## 2017-11-11 LAB
ABO + RH BLD: NORMAL
ABO + RH BLD: NORMAL
BH BB BLOOD EXPIRATION DATE: NORMAL
BH BB BLOOD EXPIRATION DATE: NORMAL
BH BB BLOOD TYPE BARCODE: 5100
BH BB BLOOD TYPE BARCODE: 5100
BH BB DISPENSE STATUS: NORMAL
BH BB DISPENSE STATUS: NORMAL
BH BB PRODUCT CODE: NORMAL
BH BB PRODUCT CODE: NORMAL
BH BB UNIT NUMBER: NORMAL
BH BB UNIT NUMBER: NORMAL
DEPRECATED RDW RBC AUTO: 44.1 FL (ref 36.4–46.3)
ERYTHROCYTE [DISTWIDTH] IN BLOOD BY AUTOMATED COUNT: 15.4 % (ref 11.5–14.5)
HCT VFR BLD AUTO: 26.1 % (ref 35–45)
HGB BLD-MCNC: 8.2 G/DL (ref 12–15.5)
MCH RBC QN AUTO: 24.7 PG (ref 26.5–34)
MCHC RBC AUTO-ENTMCNC: 31.4 G/DL (ref 31.4–36)
MCV RBC AUTO: 78.6 FL (ref 80–98)
PLATELET # BLD AUTO: 210 10*3/MM3 (ref 150–450)
PMV BLD AUTO: 9.4 FL (ref 8–12)
RBC # BLD AUTO: 3.32 10*6/MM3 (ref 3.77–5.16)
UNIT  ABO: NORMAL
UNIT  ABO: NORMAL
UNIT  RH: NORMAL
UNIT  RH: NORMAL
WBC NRBC COR # BLD: 7.59 10*3/MM3 (ref 3.2–9.8)

## 2017-11-11 PROCEDURE — 85027 COMPLETE CBC AUTOMATED: CPT | Performed by: OBSTETRICS & GYNECOLOGY

## 2017-11-11 RX ORDER — OXYCODONE HYDROCHLORIDE AND ACETAMINOPHEN 5; 325 MG/1; MG/1
1 TABLET ORAL EVERY 4 HOURS PRN
Qty: 20 TABLET | Refills: 0 | Status: SHIPPED | OUTPATIENT
Start: 2017-11-11 | End: 2017-11-15

## 2017-11-11 RX ADMIN — FERROUS SULFATE TAB EC 324 MG (65 MG FE EQUIVALENT) 324 MG: 324 (65 FE) TABLET DELAYED RESPONSE at 08:36

## 2017-11-11 RX ADMIN — OXYCODONE HYDROCHLORIDE AND ACETAMINOPHEN 2 TABLET: 5; 325 TABLET ORAL at 01:51

## 2017-11-11 RX ADMIN — PRENATAL VIT W/ FE FUMARATE-FA TAB 27-0.8 MG 1 TABLET: 27-0.8 TAB at 08:36

## 2017-11-11 RX ADMIN — IBUPROFEN 800 MG: 800 TABLET ORAL at 05:41

## 2017-11-11 NOTE — PROGRESS NOTES
NAME: Tanja Brice  : 1995  MRN: [id number]       LOS: 2 days      Chief Complaint: Abdominal discomfort     Subjective:      Interval History:  Pain well controlled with medications, (+) Flatus, - BM, lochia minimal, (+) voiding, (+) ambulation. Bilateral tubal ligation for birth control. Breastfeeding.      Patient is s/p 2 units PRBC's.  H&H 9.5/30.5 antepartum & 8.2/26.1 today. Denies fatigue, weakness, shortness of breath.        Review of Systems:   GEN: No fever/chills, no dizziness or lightheadedness  CVS: No chest pain, no palpitations  RESP: No shortness of breath  GI: No nausea or vomiting     Objective:      Vital Signs  Temp:  [97.7 °F (36.5 °C)-98.8 °F (37.1 °C)] 98.2 °F (36.8 °C)  Heart Rate:  [] 83  Resp:  [16-18] 18  BP: (115-145)/(68-86) 145/86     Physical Exam  GEN: A&O x3, NAD  CVS: RRR, S1/S2, no m/g/r  LUNGS: CTAB, no wheezes, no rhonchi  ABD: Soft, Nontender  Fundus: firm below umbilicus  EXT: no edema, no calf tenderness bilaterally.     Medication Review     Current Facility-Administered Medications:   •  bisacodyl (DULCOLAX) suppository 10 mg, 10 mg, Rectal, Daily PRN, Jarad Oliver MD  •  diphenhydrAMINE (BENADRYL) capsule 25 mg, 25 mg, Oral, Q4H PRN **OR** diphenhydrAMINE (BENADRYL) injection 25 mg, 25 mg, Intravenous, Q4H PRN **OR** diphenhydrAMINE (BENADRYL) injection 25 mg, 25 mg, Intramuscular, Q4H PRN, Benedict Nieto CRNA  •  docusate sodium (COLACE) capsule 100 mg, 100 mg, Oral, BID PRN, Jarad Oliver MD, 100 mg at 17 4838  •  ferrous sulfate EC tablet 324 mg, 324 mg, Oral, TID With Meals, Jarad Oliver MD, 324 mg at 17 1715  •  ibuprofen (ADVIL,MOTRIN) tablet 800 mg, 800 mg, Oral, Q8H, Jarad Oliver MD, 800 mg at 11/10/17 0514  •  magnesium hydroxide (MILK OF MAGNESIA) suspension 2400 mg/10mL 10 mL, 10 mL, Oral, Daily PRN, Jarad Oliver MD  •  Morphine sulfate PCA 1 mg/mL 30 mL syringe, , Intravenous,  Continuous, Jarad Oliver MD  •  naloxone (NARCAN) injection 0.1 mg, 0.1 mg, Intravenous, Q5 Min PRN, Jarad Oliver MD  •  naloxone (NARCAN) injection 0.2 mg, 0.2 mg, Intravenous, Q15 Min PRN, Benedict Nieto CRNA  •  ondansetron (ZOFRAN) tablet 4 mg, 4 mg, Oral, Q8H PRN, Jarad Oliver MD  •  oxyCODONE-acetaminophen (PERCOCET) 5-325 MG per tablet 1 tablet, 1 tablet, Oral, Q4H PRN, Jarad Oliver MD  •  oxyCODONE-acetaminophen (PERCOCET) 5-325 MG per tablet 2 tablet, 2 tablet, Oral, Q4H PRN, Jarad Oliver MD, 2 tablet at 11/10/17 0114  •  prenatal vitamin 27-0.8 tablet 1 tablet, 1 tablet, Oral, Daily, Jarad Oliver MD, 1 tablet at 11/09/17 0823  •  promethazine (PHENERGAN) tablet 12.5 mg, 12.5 mg, Oral, Q4H PRN, Jarad Oliver MD  •  simethicone (MYLICON) chewable tablet 80 mg, 80 mg, Oral, 4x Daily PRN, Jarad Oliver MD, 80 mg at 11/08/17 2142                          Diagnostic Data    Lab Results (last 24 hours)                Procedure Component Value Units Date/Time                 CBC Auto Differential [[id number]]  (Abnormal) Collected:  11/09/17 0500      Specimen:  Blood Updated:  11/09/17 0644        WBC 7.22 10*3/mm3          RBC 2.91 (L) 10*6/mm3          Hemoglobin 7.0 (L) g/dL            Test repeated to confirm results.             Hematocrit 22.2 (L) %          MCV 76.3 (L) fL          MCH 24.1 (L) pg          MCHC 31.5 g/dL          RDW 15.4 (H) %          RDW-SD 43.3 fl          MPV 9.8 fL          Platelets 167 10*3/mm3          Neutrophil % 65.0 %          Lymphocyte % 23.4 %          Monocyte % 10.4 %          Eosinophil % 0.3 %          Basophil % 0.1 %          Immature Grans % 0.8 (H) %          Neutrophils, Absolute 4.69 10*3/mm3          Lymphocytes, Absolute 1.69 10*3/mm3          Monocytes, Absolute 0.75 10*3/mm3          Eosinophils, Absolute 0.02 10*3/mm3          Basophils, Absolute 0.01 10*3/mm3          Immature Grans,  Absolute 0.06 (H) 10*3/mm3        CBC & Differential [[id number]] Collected:  11/09/17 0500      Specimen:  Blood Updated:  11/09/17 0707      Narrative:         The following orders were created for panel order CBC & Differential.  Procedure                               Abnormality         Status                     ---------                               -----------         ------                     Scan Slide[[id number]]                                       Final result               CBC Auto Differential[[id number]]        Abnormal            Final result                  Please view results for these tests on the individual orders.      Scan Slide [[id number]] Collected:  11/09/17 0500      Specimen:  Blood Updated:  11/09/17 0707        Anisocytosis Slight/1+        Dacrocytes Slight/1+        Ovalocytes Slight/1+        Poikilocytes Slight/1+        Polychromasia Slight/1+        WBC Morphology Normal        Platelet Estimate Adequate      Tissue Pathology Exam - Tissue, Placenta [[id number]] Collected:  11/08/17 0759      Specimen:  Tissue from Fallopian Tube, Left; Tissue from Fallopian Tube, Right Updated:  11/09/17 0958        Case Report --        Surgical Pathology Report                         Case: [id number]                                  Authorizing Provider:  Jarad Oliver MD   Collected:           11/08/2017 07:59 AM          Ordering Location:     [facility]             Received:            11/08/2017 10:32 AM                                 [facility]                                                                                  DELIVERY                                                                     Pathologist:           Coy Hardy MD                                                         Specimens:   1) - Fallopian Tube, Left                                                                           2) - Fallopian Tube, Right                                                                    Final Diagnosis --        SEGMENTS OF FALLOPIAN TUBES (2).       Gross Description --        1.  The first specimen consists of a segment of left fallopian tube measuring 1.0 x 0.6 cm.  Its external surface as well as cut surface are grossly unremarkable.  Bisected and all embedded as 1A.     2.  The second specimen consists of a segment of right fallopian tube measuring 1.0 x 0.6 cm.  All embedded as 2A.       Embedded Images --      Extra Tubes [[id number]] Collected:  11/10/17 0459      Specimen:  Blood from Blood, Venous Line Updated:  11/10/17 0601      Narrative:         The following orders were created for panel order Extra Tubes.  Procedure                               Abnormality         Status                     ---------                               -----------         ------                     Gold Top - SST[[id number]]                                   Final result                  Please view results for these tests on the individual orders.      Gold Top - SST [[id number]] Collected:  11/10/17 0459      Specimen:  Blood Updated:  11/10/17 0601        Extra Tube Hold for add-ons.          Auto resulted.          CBC (No Diff) [[id number]]  (Abnormal) Collected:  11/10/17 0459      Specimen:  Blood Updated:  11/10/17 0607        WBC 6.20 10*3/mm3          RBC 2.71 (L) 10*6/mm3          Hemoglobin 6.5 (L) g/dL          Hematocrit 21.2 (L) %          MCV 78.2 (L) fL          MCH 24.0 (L) pg          MCHC 30.7 (L) g/dL          RDW 15.7 (H) %          RDW-SD 44.7 fl          MPV 8.8 fL          Platelets 201 10*3/mm3               I reviewed the patient's new clinical results.     Assessment/Plan:      Tanja Brice 22 y.o.  POD# 3 s/p primary LTCS and BTL doing well  1. Encourage ambulation  2. Continue routine postpartum care.  3. Continue to monitor H&H     Plan for disposition: Discharge home POD#3 - POD#4 when stable.       This document has been  electronically signed by Saroj Hilario Jr, MD on November 11, 2017 7:48 AM

## 2017-11-11 NOTE — PLAN OF CARE
Problem: Patient Care Overview (Adult)  Goal: Plan of Care Review  Outcome: Ongoing (interventions implemented as appropriate)    11/11/17 0418   Coping/Psychosocial Response Interventions   Plan Of Care Reviewed With patient   Patient Care Overview   Progress improving       Goal: Adult Individualization and Mutuality  Outcome: Ongoing (interventions implemented as appropriate)  Goal: Discharge Needs Assessment  Outcome: Ongoing (interventions implemented as appropriate)

## 2017-11-11 NOTE — DISCHARGE SUMMARY
OBSTETRICS DISCHARGE SUMMARY    NAME: Tanja Brice     ADMITTED: 2017  : 1995     DISCHARGED: 17  MRN: [id number]    ADMISSION DIAGNOSES:  1. Intrauterine pregnancy at 39w0d GA  2. Breech Presentation  3. Desires Permanent Sterilization DISCHARGE DIAGNOSES:  1. S/P Primary Low Transverse  Section w/BTL     PROCEDURES: , Low Transverse   DELIVERING PHYSICIAN: Jarad Oliver MD    HISTORY AND HOSPITAL COURSE:    Patient is a 22 y.o. female  who presented at 39w0d GA for 1LTCS.  Estimated Date of Delivery: 11/15/17. Her pregnancy was complicated by breech presentation. Please see H&P for full details.     She was admitted and taken for primary LTCS.  She had a LTCS delivery of a viable male infant, [id number], Apgars 9/9. No immediate complications were encountered.  Please see procedure note for full details.    Her postpartum course was complicated by blood loss anemia and need for transfusion of 2 units PRBCs.  Antepartem H/H was 9.5/30.5, postpartum H/H 6.5/21.1.  H/H now stable at 8.2/26.1 s/p transfusion 2 units prbc's.  She was ambulating well, voiding without difficulty and lochia was within normal limits.   She is breastfeeding without difficulty.  She was stable for discharge on POD#3.      DISCHARGE CONDITION: Stable    DISPOSITION: Home    DISCHARGE MEDICATIONS   Tanja Brice   Home Medication Instructions MARTHA:[id number]    Printed on:17 0904   Medication Information                      ferrous sulfate 324 (65 Fe) MG tablet delayed-release EC tablet  Take 1 tablet by mouth 3 (Three) Times a Day With Meals.             Prenatal Vit-Fe Fumarate-FA (MYNATAL PLUS) tablet  Take 1 tablet by mouth Daily.                 INSTRUCTIONS:  Activity: as tolerated  Diet: as tolerated  Special instructions: Precautions and instructions were discussed with her including but not limited to maintaining a regular diet at home, practicing local  hygiene, pelvic rest and signs and symptoms to report including heavy vaginal bleeding, frequent passage of clots, foul odor of lochia, increased pain, fever or any other concerns.    FOLLOW UP:  MD Jarad Ramos MD is the attending at time of discharge, he is aware of the patient's status and agrees with the above discharge summary.  Saroj Hilario Jr., M.D.  PGY1  Family Practice Resident  [address] [place] [address]  Phone: [phone]  Fax: [phone]      This document has been electronically signed by Saroj Hilario Jr, MD on November 11, 2017 9:04 AM

## 2017-11-15 ENCOUNTER — OFFICE VISIT (OUTPATIENT)
Dept: OBSTETRICS AND GYNECOLOGY | Facility: CLINIC | Age: 22
End: 2017-11-15

## 2017-11-15 ENCOUNTER — APPOINTMENT (OUTPATIENT)
Dept: LAB | Facility: HOSPITAL | Age: 22
End: 2017-11-15

## 2017-11-15 VITALS
WEIGHT: 201 LBS | SYSTOLIC BLOOD PRESSURE: 118 MMHG | DIASTOLIC BLOOD PRESSURE: 70 MMHG | BODY MASS INDEX: 35.61 KG/M2 | HEIGHT: 63 IN

## 2017-11-15 DIAGNOSIS — Z09 SURGICAL FOLLOW-UP CARE: Primary | ICD-10-CM

## 2017-11-15 DIAGNOSIS — R79.89 ABNORMAL COMPLETE BLOOD COUNT: ICD-10-CM

## 2017-11-15 LAB
DEPRECATED RDW RBC AUTO: 48.4 FL (ref 36.4–46.3)
ERYTHROCYTE [DISTWIDTH] IN BLOOD BY AUTOMATED COUNT: 16.8 % (ref 11.5–14.5)
HCT VFR BLD AUTO: 36.3 % (ref 35–45)
HGB BLD-MCNC: 11.2 G/DL (ref 12–15.5)
MCH RBC QN AUTO: 24.9 PG (ref 26.5–34)
MCHC RBC AUTO-ENTMCNC: 30.9 G/DL (ref 31.4–36)
MCV RBC AUTO: 80.7 FL (ref 80–98)
PLATELET # BLD AUTO: 358 10*3/MM3 (ref 150–450)
PMV BLD AUTO: 9.6 FL (ref 8–12)
RBC # BLD AUTO: 4.5 10*6/MM3 (ref 3.77–5.16)
WBC NRBC COR # BLD: 8.84 10*3/MM3 (ref 3.2–9.8)

## 2017-11-15 PROCEDURE — 36415 COLL VENOUS BLD VENIPUNCTURE: CPT | Performed by: OBSTETRICS & GYNECOLOGY

## 2017-11-15 PROCEDURE — 85027 COMPLETE CBC AUTOMATED: CPT | Performed by: OBSTETRICS & GYNECOLOGY

## 2017-11-15 PROCEDURE — 99212 OFFICE O/P EST SF 10 MIN: CPT | Performed by: OBSTETRICS & GYNECOLOGY

## 2017-11-15 NOTE — PROGRESS NOTES
Subjective   Tanja Brice is a 22 y.o. female.     HPI Comments: Patient presents today for f/u 1 wk s/p 1LTCS/BTL  Pain is well controlled  Light lochia  Breast feeding  Taking Fe supplement  Not feeling down or depressed        Review of Systems    Objective   Physical Exam    Assessment/Plan   Tanja was seen today for post-op.    Diagnoses and all orders for this visit:    Surgical follow-up care    Abnormal complete blood count  -     CBC (No Diff)

## 2017-12-13 ENCOUNTER — OFFICE VISIT (OUTPATIENT)
Dept: OBSTETRICS AND GYNECOLOGY | Facility: CLINIC | Age: 22
End: 2017-12-13

## 2017-12-13 VITALS
WEIGHT: 197 LBS | HEIGHT: 63 IN | BODY MASS INDEX: 34.91 KG/M2 | DIASTOLIC BLOOD PRESSURE: 70 MMHG | SYSTOLIC BLOOD PRESSURE: 118 MMHG

## 2017-12-13 DIAGNOSIS — N89.8 VAGINAL DISCHARGE: ICD-10-CM

## 2017-12-13 LAB
CANDIDA ALBICANS: NEGATIVE
GARDNERELLA VAGINALIS: NEGATIVE
TRICHOMONAS VAGINALIS PCR: NEGATIVE

## 2017-12-13 PROCEDURE — 87480 CANDIDA DNA DIR PROBE: CPT | Performed by: OBSTETRICS & GYNECOLOGY

## 2017-12-13 PROCEDURE — 87660 TRICHOMONAS VAGIN DIR PROBE: CPT | Performed by: OBSTETRICS & GYNECOLOGY

## 2017-12-13 PROCEDURE — 87510 GARDNER VAG DNA DIR PROBE: CPT | Performed by: OBSTETRICS & GYNECOLOGY

## 2017-12-13 PROCEDURE — 99024 POSTOP FOLLOW-UP VISIT: CPT | Performed by: OBSTETRICS & GYNECOLOGY

## 2017-12-13 NOTE — PROGRESS NOTES
Subjective   Tanja Brice is a 22 y.o. female.     HPI Comments: Patient presents today for f/u 6 wk s/p 1LTCS/BTL  Pain well controlled  VB has resolved  Breast feeding  Not feeling down or depressed          Review of Systems   Gastrointestinal: Negative for abdominal pain.   Genitourinary: Negative for vaginal bleeding.   Psychiatric/Behavioral: Negative for dysphoric mood.   All other systems reviewed and are negative.      Objective   Physical Exam   Constitutional: She is oriented to person, place, and time. She appears well-developed and well-nourished. No distress.   HENT:   Head: Normocephalic and atraumatic.   Right Ear: External ear normal.   Left Ear: External ear normal.   Nose: Nose normal.   Mouth/Throat: Oropharynx is clear and moist. No oropharyngeal exudate.   Eyes: Conjunctivae and EOM are normal. Pupils are equal, round, and reactive to light. Right eye exhibits no discharge. Left eye exhibits no discharge. No scleral icterus.   Neck: Normal range of motion. Neck supple. No tracheal deviation present. No thyromegaly present.   Cardiovascular: Normal rate, regular rhythm, normal heart sounds and intact distal pulses.  Exam reveals no gallop and no friction rub.    No murmur heard.  Pulmonary/Chest: Effort normal and breath sounds normal. No respiratory distress. She has no wheezes. She has no rales. She exhibits no mass, no tenderness, no laceration, no deformity and no retraction. Right breast exhibits nipple discharge (milk). Right breast exhibits no inverted nipple, no mass, no skin change and no tenderness. Left breast exhibits nipple discharge (milk). Left breast exhibits no inverted nipple, no mass, no skin change and no tenderness. Breasts are symmetrical. There is no breast swelling.   Abdominal: Soft. She exhibits no distension and no mass. There is no tenderness. There is no rebound and no guarding. No hernia.   Genitourinary: Vagina normal and uterus normal. No breast  tenderness, discharge or bleeding. Pelvic exam was performed with patient supine. No labial fusion. There is no rash, tenderness, lesion or injury on the right labia. There is no rash, tenderness, lesion or injury on the left labia. Uterus is not deviated, not enlarged, not fixed and not tender. Cervix exhibits no motion tenderness, no discharge and no friability. Right adnexum displays no mass, no tenderness and no fullness. Left adnexum displays no mass, no tenderness and no fullness. No erythema, tenderness or bleeding in the vagina. No foreign body in the vagina. No signs of injury around the vagina. No vaginal discharge found.   Musculoskeletal: Normal range of motion. She exhibits no edema or deformity.   Neurological: She is alert and oriented to person, place, and time. No cranial nerve deficit. Coordination normal.   Skin: Skin is warm and dry. No rash noted. She is not diaphoretic. No erythema. No pallor.   Psychiatric: She has a normal mood and affect. Her behavior is normal. Judgment and thought content normal.   Vitals reviewed.      Assessment/Plan   Tanja was seen today for follow-up.    Diagnoses and all orders for this visit:    Routine postpartum follow-up    Vaginal discharge  -     Gardnerella vaginalis, Trichomonas vaginalis, Candida albicans, PCR - Swab, Vagina         F/u 1 year and PRN  Vaginitis panel, call if abnormal

## 2018-01-25 ENCOUNTER — HOSPITAL ENCOUNTER (EMERGENCY)
Facility: HOSPITAL | Age: 23
Discharge: HOME | End: 2018-01-26
Attending: FAMILY MEDICINE | Admitting: FAMILY MEDICINE

## 2018-01-25 DIAGNOSIS — K80.20 CALCULUS OF GALLBLADDER WITHOUT CHOLECYSTITIS WITHOUT OBSTRUCTION: Primary | ICD-10-CM

## 2018-01-25 DIAGNOSIS — N20.0 NEPHROLITHIASIS: ICD-10-CM

## 2018-01-25 DIAGNOSIS — R10.30 LOWER ABDOMINAL PAIN: ICD-10-CM

## 2018-01-25 PROCEDURE — 85025 COMPLETE CBC W/AUTO DIFF WBC: CPT | Performed by: FAMILY MEDICINE

## 2018-01-25 PROCEDURE — 81003 URINALYSIS AUTO W/O SCOPE: CPT | Performed by: FAMILY MEDICINE

## 2018-01-25 PROCEDURE — 99284 EMERGENCY DEPT VISIT MOD MDM: CPT

## 2018-01-25 PROCEDURE — 80053 COMPREHEN METABOLIC PANEL: CPT | Performed by: FAMILY MEDICINE

## 2018-01-25 PROCEDURE — 84703 CHORIONIC GONADOTROPIN ASSAY: CPT | Performed by: FAMILY MEDICINE

## 2018-01-25 RX ORDER — PNV,CALCIUM 72/IRON/FOLIC ACID 27 MG-1 MG
1 TABLET ORAL DAILY
Refills: 12 | COMMUNITY
Start: 2017-11-27 | End: 2018-04-16 | Stop reason: HOSPADM

## 2018-01-25 RX ORDER — FERROUS SULFATE 325(65) MG
325 TABLET ORAL
COMMUNITY
End: 2018-04-16 | Stop reason: HOSPADM

## 2018-01-25 RX ORDER — SODIUM CHLORIDE 0.9 % (FLUSH) 0.9 %
10 SYRINGE (ML) INJECTION AS NEEDED
Status: DISCONTINUED | OUTPATIENT
Start: 2018-01-25 | End: 2018-01-26 | Stop reason: HOSPADM

## 2018-01-26 ENCOUNTER — APPOINTMENT (OUTPATIENT)
Dept: CT IMAGING | Facility: HOSPITAL | Age: 23
End: 2018-01-26

## 2018-01-26 VITALS
HEART RATE: 72 BPM | WEIGHT: 200 LBS | DIASTOLIC BLOOD PRESSURE: 76 MMHG | SYSTOLIC BLOOD PRESSURE: 130 MMHG | BODY MASS INDEX: 35.44 KG/M2 | TEMPERATURE: 98.6 F | OXYGEN SATURATION: 95 % | HEIGHT: 63 IN | RESPIRATION RATE: 18 BRPM

## 2018-01-26 LAB
ALBUMIN SERPL-MCNC: 4.3 G/DL (ref 3.4–4.8)
ALBUMIN/GLOB SERPL: 1.3 G/DL (ref 1.1–1.8)
ALP SERPL-CCNC: 72 U/L (ref 38–126)
ALT SERPL W P-5'-P-CCNC: 53 U/L (ref 9–52)
ANION GAP SERPL CALCULATED.3IONS-SCNC: 8 MMOL/L (ref 5–15)
AST SERPL-CCNC: 37 U/L (ref 14–36)
BASOPHILS # BLD AUTO: 0.01 10*3/MM3 (ref 0–0.2)
BASOPHILS NFR BLD AUTO: 0.1 % (ref 0–2)
BILIRUB SERPL-MCNC: 0.2 MG/DL (ref 0.2–1.3)
BILIRUB UR QL STRIP: NEGATIVE
BUN BLD-MCNC: 12 MG/DL (ref 7–21)
BUN/CREAT SERPL: 20.7 (ref 7–25)
CALCIUM SPEC-SCNC: 9.2 MG/DL (ref 8.4–10.2)
CHLORIDE SERPL-SCNC: 101 MMOL/L (ref 95–110)
CLARITY UR: CLEAR
CO2 SERPL-SCNC: 26 MMOL/L (ref 22–31)
COLOR UR: YELLOW
CREAT BLD-MCNC: 0.58 MG/DL (ref 0.5–1)
DEPRECATED RDW RBC AUTO: 45.9 FL (ref 36.4–46.3)
EOSINOPHIL # BLD AUTO: 0.04 10*3/MM3 (ref 0–0.7)
EOSINOPHIL NFR BLD AUTO: 0.6 % (ref 0–7)
ERYTHROCYTE [DISTWIDTH] IN BLOOD BY AUTOMATED COUNT: 15.4 % (ref 11.5–14.5)
GFR SERPL CREATININE-BSD FRML MDRD: 128 ML/MIN/1.73 (ref 71–165)
GLOBULIN UR ELPH-MCNC: 3.3 GM/DL (ref 2.3–3.5)
GLUCOSE BLD-MCNC: 130 MG/DL (ref 60–100)
GLUCOSE UR STRIP-MCNC: NEGATIVE MG/DL
HCG SERPL QL: NEGATIVE
HCT VFR BLD AUTO: 37 % (ref 35–45)
HGB BLD-MCNC: 12.1 G/DL (ref 12–15.5)
HGB UR QL STRIP.AUTO: NEGATIVE
HOLD SPECIMEN: NORMAL
HOLD SPECIMEN: NORMAL
IMM GRANULOCYTES # BLD: 0.02 10*3/MM3 (ref 0–0.02)
IMM GRANULOCYTES NFR BLD: 0.3 % (ref 0–0.5)
KETONES UR QL STRIP: NEGATIVE
LEUKOCYTE ESTERASE UR QL STRIP.AUTO: NEGATIVE
LYMPHOCYTES # BLD AUTO: 2.32 10*3/MM3 (ref 0.6–4.2)
LYMPHOCYTES NFR BLD AUTO: 34.8 % (ref 10–50)
MCH RBC QN AUTO: 26.7 PG (ref 26.5–34)
MCHC RBC AUTO-ENTMCNC: 32.7 G/DL (ref 31.4–36)
MCV RBC AUTO: 81.7 FL (ref 80–98)
MONOCYTES # BLD AUTO: 0.58 10*3/MM3 (ref 0–0.9)
MONOCYTES NFR BLD AUTO: 8.7 % (ref 0–12)
NEUTROPHILS # BLD AUTO: 3.7 10*3/MM3 (ref 2–8.6)
NEUTROPHILS NFR BLD AUTO: 55.5 % (ref 37–80)
NITRITE UR QL STRIP: NEGATIVE
PH UR STRIP.AUTO: 8.5 [PH] (ref 5–9)
PLATELET # BLD AUTO: 258 10*3/MM3 (ref 150–450)
PMV BLD AUTO: 9.4 FL (ref 8–12)
POTASSIUM BLD-SCNC: 3.8 MMOL/L (ref 3.5–5.1)
PROT SERPL-MCNC: 7.6 G/DL (ref 6.3–8.6)
PROT UR QL STRIP: ABNORMAL
RBC # BLD AUTO: 4.53 10*6/MM3 (ref 3.77–5.16)
SODIUM BLD-SCNC: 135 MMOL/L (ref 137–145)
SP GR UR STRIP: 1.01 (ref 1–1.03)
UROBILINOGEN UR QL STRIP: ABNORMAL
WBC NRBC COR # BLD: 6.67 10*3/MM3 (ref 3.2–9.8)
WHOLE BLOOD HOLD SPECIMEN: NORMAL
WHOLE BLOOD HOLD SPECIMEN: NORMAL

## 2018-01-26 PROCEDURE — 96361 HYDRATE IV INFUSION ADD-ON: CPT

## 2018-01-26 PROCEDURE — 25010000002 MORPHINE PER 10 MG: Performed by: FAMILY MEDICINE

## 2018-01-26 PROCEDURE — 25010000002 ONDANSETRON PER 1 MG: Performed by: FAMILY MEDICINE

## 2018-01-26 PROCEDURE — 96375 TX/PRO/DX INJ NEW DRUG ADDON: CPT

## 2018-01-26 PROCEDURE — 96374 THER/PROPH/DIAG INJ IV PUSH: CPT

## 2018-01-26 PROCEDURE — 74177 CT ABD & PELVIS W/CONTRAST: CPT

## 2018-01-26 PROCEDURE — 0 IOPAMIDOL 61 % SOLUTION: Performed by: FAMILY MEDICINE

## 2018-01-26 RX ORDER — ONDANSETRON 2 MG/ML
4 INJECTION INTRAMUSCULAR; INTRAVENOUS ONCE
Status: COMPLETED | OUTPATIENT
Start: 2018-01-26 | End: 2018-01-26

## 2018-01-26 RX ADMIN — IOPAMIDOL 100 ML: 612 INJECTION, SOLUTION INTRAVENOUS at 00:46

## 2018-01-26 RX ADMIN — MORPHINE SULFATE 4 MG: 4 INJECTION, SOLUTION INTRAMUSCULAR; INTRAVENOUS at 00:12

## 2018-01-26 RX ADMIN — SODIUM CHLORIDE 1000 ML: 9 INJECTION, SOLUTION INTRAVENOUS at 00:10

## 2018-01-26 RX ADMIN — Medication 10 ML: at 00:10

## 2018-01-26 RX ADMIN — ONDANSETRON 4 MG: 2 INJECTION INTRAMUSCULAR; INTRAVENOUS at 00:11

## 2018-01-26 NOTE — ED PROVIDER NOTES
Subjective   Patient is a 22 y.o. female presenting with abdominal pain.   History provided by:  Patient   used: No    Abdominal Pain   Pain location:  RLQ  Pain quality: sharp    Pain radiates to:  Does not radiate  Pain severity:  Moderate  Onset quality:  Gradual  Timing:  Constant  Progression:  Worsening  Chronicity:  New      Review of Systems   Gastrointestinal: Positive for abdominal pain.   All other systems reviewed and are negative.      Past Medical History:   Diagnosis Date   • Anemia during pregnancy in third trimester 2017   • Anxiety    • Chest pain, unspecified    • Contact dermatitis due to poison ivy    • Exanthem due to herpes zoster    • H/O echocardiogram 12/15/2015    Normal LV function with Ef 60% without regional wall motion abnormalities. Normal Ev size with normal function. Normal diastolic function. No significant valvular regurgitation or stenosis   • Herpes zoster ophthalmicus     no ocular involvement      • History of chicken pox    • History of Holter monitoring 12/15/2015    Sinus mechanism with normal average heart rate with no evicence of chronotropic incompetence. Normal burden of ventricular and supraventricular ectopic event. Symptoms correlated only with sinus mechanism   • Migraine    • Obesity affecting pregnancy 2017   • Palpitations    • Shingles    • Temporomandibular joint disorder        No Known Allergies    Past Surgical History:   Procedure Laterality Date   •  SECTION N/A 2017    Procedure:  SECTION PRIMARY;  Surgeon: Jarad Oliver MD;  Location: [facility] LABOR DELIVERY;  Service:    • FOREIGN BODY REMOVAL Left 2017    splinter removed from left great toe. nerve block used.    • TUBAL ABDOMINAL LIGATION     • WISDOM TOOTH EXTRACTION         Family History   Problem Relation Age of Onset   • Adopted: Yes   • Hyperthyroidism Mother        Social History     Social History   • Marital status:       "Spouse name: N/A   • Number of children: N/A   • Years of education: N/A     Social History Main Topics   • Smoking status: Never Smoker   • Smokeless tobacco: Never Used   • Alcohol use No   • Drug use: No   • Sexual activity: Yes     Partners: Male     Other Topics Concern   • None     Social History Narrative       /76 (BP Location: Right arm, Patient Position: Sitting)  Pulse 71  Temp 98.8 °F (37.1 °C) (Oral)   Resp 18  Ht 160 cm (63\")  Wt 90.7 kg (200 lb)  SpO2 99%  BMI 35.43 kg/m2  Objective   Physical Exam   Constitutional: She is oriented to person, place, and time. She appears well-developed and well-nourished.   HENT:   Head: Normocephalic.   Right Ear: External ear normal.   Left Ear: External ear normal.   Nose: Nose normal.   Mouth/Throat: Oropharynx is clear and moist.   Neck: Normal range of motion. Neck supple.   Cardiovascular: Normal rate, regular rhythm, normal heart sounds and intact distal pulses.    Pulmonary/Chest: Effort normal and breath sounds normal.   Abdominal: Soft. Bowel sounds are normal. There is tenderness.       Neurological: She is alert and oriented to person, place, and time.   Skin: Skin is warm.   Psychiatric: She has a normal mood and affect. Her behavior is normal. Judgment and thought content normal.   Nursing note and vitals reviewed.      Procedures         ED Course  ED Course        Labs Reviewed   COMPREHENSIVE METABOLIC PANEL - Abnormal; Notable for the following:        Result Value    Glucose 130 (*)     Sodium 135 (*)     ALT (SGPT) 53 (*)     AST (SGOT) 37 (*)     All other components within normal limits   URINALYSIS W/ CULTURE IF INDICATED - Abnormal; Notable for the following:     Protein, UA Trace (*)     All other components within normal limits    Narrative:     Urine microscopic not indicated.   CBC WITH AUTO DIFFERENTIAL - Abnormal; Notable for the following:     RDW 15.4 (*)     All other components within normal limits   HCG, SERUM, " QUALITATIVE - Normal   RAINBOW DRAW    Narrative:     The following orders were created for panel order [place] Draw.  Procedure                               Abnormality         Status                     ---------                               -----------         ------                     Light Blue Top[[id number]]                                   Final result               Green Top (Gel)[[id number]]                                  Final result               Lavender Top[[id number]]                                     Final result               Gold Top - SST[[id number]]                                   Final result                 Please view results for these tests on the individual orders.   CBC AND DIFFERENTIAL    Narrative:     The following orders were created for panel order CBC & Differential.  Procedure                               Abnormality         Status                     ---------                               -----------         ------                     CBC Auto Differential[[id number]]        Abnormal            Final result                 Please view results for these tests on the individual orders.   LIGHT BLUE TOP   GREEN TOP   LAVENDER TOP   GOLD TOP - SST       CT Abdomen Pelvis With Contrast   Final Result   1. No obvious acute abnormality.   2. Cholelithiasis   3. Right nephrolithiasis      Electronically signed by:  Kel Judd MD  1/26/2018 1:08 AM   [employer] Workstation: [id number]                [facility]    Final diagnoses:   Calculus of gallbladder without cholecystitis without obstruction   Nephrolithiasis   Lower abdominal pain            Kash Haque MD  01/26/18 0117

## 2018-02-17 ENCOUNTER — HOSPITAL ENCOUNTER (EMERGENCY)
Facility: HOSPITAL | Age: 23
Discharge: HOME | End: 2018-02-18
Attending: FAMILY MEDICINE | Admitting: FAMILY MEDICINE

## 2018-02-17 DIAGNOSIS — N39.0 URINARY TRACT INFECTION WITH HEMATURIA, SITE UNSPECIFIED: Primary | ICD-10-CM

## 2018-02-17 DIAGNOSIS — R31.9 URINARY TRACT INFECTION WITH HEMATURIA, SITE UNSPECIFIED: Primary | ICD-10-CM

## 2018-02-17 LAB
ALBUMIN SERPL-MCNC: 4.4 G/DL (ref 3.4–4.8)
ALBUMIN/GLOB SERPL: 1.3 G/DL (ref 1.1–1.8)
ALP SERPL-CCNC: 67 U/L (ref 38–126)
ALT SERPL W P-5'-P-CCNC: 61 U/L (ref 9–52)
ANION GAP SERPL CALCULATED.3IONS-SCNC: 13 MMOL/L (ref 5–15)
AST SERPL-CCNC: 40 U/L (ref 14–36)
BACTERIA UR QL AUTO: ABNORMAL /HPF
BILIRUB SERPL-MCNC: <0.1 MG/DL (ref 0.2–1.3)
BILIRUB UR QL STRIP: NEGATIVE
BUN BLD-MCNC: 11 MG/DL (ref 7–21)
BUN/CREAT SERPL: 16.4 (ref 7–25)
CALCIUM SPEC-SCNC: 9.4 MG/DL (ref 8.4–10.2)
CHLORIDE SERPL-SCNC: 103 MMOL/L (ref 95–110)
CLARITY UR: ABNORMAL
CO2 SERPL-SCNC: 28 MMOL/L (ref 22–31)
COLOR UR: YELLOW
CREAT BLD-MCNC: 0.67 MG/DL (ref 0.5–1)
GFR SERPL CREATININE-BSD FRML MDRD: 108 ML/MIN/1.73 (ref 60–165)
GLOBULIN UR ELPH-MCNC: 3.4 GM/DL (ref 2.3–3.5)
GLUCOSE BLD-MCNC: 91 MG/DL (ref 60–100)
GLUCOSE UR STRIP-MCNC: NEGATIVE MG/DL
HGB UR QL STRIP.AUTO: ABNORMAL
HYALINE CASTS UR QL AUTO: ABNORMAL /LPF
KETONES UR QL STRIP: NEGATIVE
LEUKOCYTE ESTERASE UR QL STRIP.AUTO: ABNORMAL
LIPASE SERPL-CCNC: 66 U/L (ref 23–300)
NITRITE UR QL STRIP: NEGATIVE
PH UR STRIP.AUTO: 6 [PH] (ref 5–9)
POTASSIUM BLD-SCNC: 3.9 MMOL/L (ref 3.5–5.1)
PROT SERPL-MCNC: 7.8 G/DL (ref 6.3–8.6)
PROT UR QL STRIP: ABNORMAL
RBC # UR: ABNORMAL /HPF
REF LAB TEST METHOD: ABNORMAL
SODIUM BLD-SCNC: 144 MMOL/L (ref 137–145)
SP GR UR STRIP: 1.02 (ref 1–1.03)
SQUAMOUS #/AREA URNS HPF: ABNORMAL /HPF
UROBILINOGEN UR QL STRIP: ABNORMAL
WBC UR QL AUTO: ABNORMAL /HPF

## 2018-02-17 PROCEDURE — 87077 CULTURE AEROBIC IDENTIFY: CPT | Performed by: FAMILY MEDICINE

## 2018-02-17 PROCEDURE — 25010000002 MORPHINE PER 10 MG: Performed by: FAMILY MEDICINE

## 2018-02-17 PROCEDURE — 96361 HYDRATE IV INFUSION ADD-ON: CPT

## 2018-02-17 PROCEDURE — 87086 URINE CULTURE/COLONY COUNT: CPT | Performed by: FAMILY MEDICINE

## 2018-02-17 PROCEDURE — 81001 URINALYSIS AUTO W/SCOPE: CPT | Performed by: FAMILY MEDICINE

## 2018-02-17 PROCEDURE — 96374 THER/PROPH/DIAG INJ IV PUSH: CPT

## 2018-02-17 PROCEDURE — 85025 COMPLETE CBC W/AUTO DIFF WBC: CPT | Performed by: FAMILY MEDICINE

## 2018-02-17 PROCEDURE — 25010000002 ONDANSETRON PER 1 MG: Performed by: FAMILY MEDICINE

## 2018-02-17 PROCEDURE — 83690 ASSAY OF LIPASE: CPT | Performed by: FAMILY MEDICINE

## 2018-02-17 PROCEDURE — 87186 SC STD MICRODIL/AGAR DIL: CPT | Performed by: FAMILY MEDICINE

## 2018-02-17 PROCEDURE — 96375 TX/PRO/DX INJ NEW DRUG ADDON: CPT

## 2018-02-17 PROCEDURE — 80053 COMPREHEN METABOLIC PANEL: CPT | Performed by: FAMILY MEDICINE

## 2018-02-17 PROCEDURE — 99284 EMERGENCY DEPT VISIT MOD MDM: CPT

## 2018-02-17 RX ORDER — ONDANSETRON 2 MG/ML
4 INJECTION INTRAMUSCULAR; INTRAVENOUS ONCE
Status: COMPLETED | OUTPATIENT
Start: 2018-02-17 | End: 2018-02-17

## 2018-02-17 RX ADMIN — MORPHINE SULFATE 4 MG: 4 INJECTION, SOLUTION INTRAMUSCULAR; INTRAVENOUS at 22:58

## 2018-02-17 RX ADMIN — SODIUM CHLORIDE 1000 ML: 9 INJECTION, SOLUTION INTRAVENOUS at 22:57

## 2018-02-17 RX ADMIN — ONDANSETRON 4 MG: 2 INJECTION INTRAMUSCULAR; INTRAVENOUS at 22:58

## 2018-02-17 RX ADMIN — SODIUM CHLORIDE 1000 ML: 900 INJECTION, SOLUTION INTRAVENOUS at 22:58

## 2018-02-18 VITALS
HEART RATE: 95 BPM | WEIGHT: 200 LBS | BODY MASS INDEX: 35.44 KG/M2 | RESPIRATION RATE: 18 BRPM | DIASTOLIC BLOOD PRESSURE: 78 MMHG | TEMPERATURE: 98.5 F | SYSTOLIC BLOOD PRESSURE: 127 MMHG | OXYGEN SATURATION: 100 % | HEIGHT: 63 IN

## 2018-02-18 LAB
BASOPHILS # BLD AUTO: 0.02 10*3/MM3 (ref 0–0.2)
BASOPHILS NFR BLD AUTO: 0.2 % (ref 0–2)
DEPRECATED RDW RBC AUTO: 41.2 FL (ref 36.4–46.3)
EOSINOPHIL # BLD AUTO: 0.07 10*3/MM3 (ref 0–0.7)
EOSINOPHIL NFR BLD AUTO: 0.8 % (ref 0–7)
ERYTHROCYTE [DISTWIDTH] IN BLOOD BY AUTOMATED COUNT: 13.9 % (ref 11.5–14.5)
HCT VFR BLD AUTO: 37.6 % (ref 35–45)
HGB BLD-MCNC: 12.5 G/DL (ref 12–15.5)
HOLD SPECIMEN: NORMAL
IMM GRANULOCYTES # BLD: 0.03 10*3/MM3 (ref 0–0.02)
IMM GRANULOCYTES NFR BLD: 0.3 % (ref 0–0.5)
LYMPHOCYTES # BLD AUTO: 2.53 10*3/MM3 (ref 0.6–4.2)
LYMPHOCYTES NFR BLD AUTO: 28.8 % (ref 10–50)
MCH RBC QN AUTO: 27 PG (ref 26.5–34)
MCHC RBC AUTO-ENTMCNC: 33.2 G/DL (ref 31.4–36)
MCV RBC AUTO: 81.2 FL (ref 80–98)
MONOCYTES # BLD AUTO: 0.89 10*3/MM3 (ref 0–0.9)
MONOCYTES NFR BLD AUTO: 10.1 % (ref 0–12)
NEUTROPHILS # BLD AUTO: 5.26 10*3/MM3 (ref 2–8.6)
NEUTROPHILS NFR BLD AUTO: 59.8 % (ref 37–80)
NRBC BLD MANUAL-RTO: 0 /100 WBC (ref 0–0)
PLATELET # BLD AUTO: 243 10*3/MM3 (ref 150–450)
PMV BLD AUTO: 9.3 FL (ref 8–12)
RBC # BLD AUTO: 4.63 10*6/MM3 (ref 3.77–5.16)
WBC NRBC COR # BLD: 8.8 10*3/MM3 (ref 3.2–9.8)

## 2018-02-18 RX ORDER — SULFAMETHOXAZOLE AND TRIMETHOPRIM 800; 160 MG/1; MG/1
1 TABLET ORAL ONCE
Status: COMPLETED | OUTPATIENT
Start: 2018-02-18 | End: 2018-02-18

## 2018-02-18 RX ORDER — HYDROCODONE BITARTRATE AND ACETAMINOPHEN 5; 325 MG/1; MG/1
1 TABLET ORAL EVERY 6 HOURS PRN
Qty: 15 TABLET | Refills: 0 | Status: SHIPPED | OUTPATIENT
Start: 2018-02-18 | End: 2018-04-16 | Stop reason: HOSPADM

## 2018-02-18 RX ORDER — SULFAMETHOXAZOLE AND TRIMETHOPRIM 800; 160 MG/1; MG/1
1 TABLET ORAL 2 TIMES DAILY
Qty: 14 TABLET | Refills: 0 | Status: SHIPPED | OUTPATIENT
Start: 2018-02-18 | End: 2018-02-25

## 2018-02-18 RX ORDER — ONDANSETRON 4 MG/1
4 TABLET, ORALLY DISINTEGRATING ORAL EVERY 6 HOURS PRN
Qty: 10 TABLET | Refills: 0 | Status: SHIPPED | OUTPATIENT
Start: 2018-02-18 | End: 2018-04-16 | Stop reason: HOSPADM

## 2018-02-18 RX ADMIN — SULFAMETHOXAZOLE AND TRIMETHOPRIM 160 MG: 800; 160 TABLET ORAL at 00:22

## 2018-02-18 NOTE — ED PROVIDER NOTES
Subjective   Patient is a 22 y.o. female presenting with female genitourinary complaint.   Female  Problem   Primary symptoms include dysuria.  Primary symptoms include no discharge, no genital rash, no vaginal bleeding. There has been no fever. This is a new problem. The current episode started 1 to 2 hours ago. The problem occurs constantly. The problem has been gradually worsening. The symptoms occur during urination. She is not pregnant. LMP: 2/4/18. Pertinent negatives include no diaphoresis, no abdominal pain, no diarrhea, no nausea, no vomiting, no frequency, no light-headedness and no dizziness.       Review of Systems   Constitutional: Negative for appetite change, chills, diaphoresis, fatigue and fever.   HENT: Negative for congestion, ear discharge, ear pain, nosebleeds, rhinorrhea, sinus pressure, sore throat and trouble swallowing.    Eyes: Negative for discharge and redness.   Respiratory: Negative for apnea, cough, chest tightness, shortness of breath and wheezing.    Cardiovascular: Negative for chest pain.   Gastrointestinal: Negative for abdominal pain, diarrhea, nausea and vomiting.   Endocrine: Negative for polyuria.   Genitourinary: Positive for dysuria. Negative for frequency, urgency and vaginal bleeding.   Musculoskeletal: Negative for myalgias and neck pain.   Skin: Negative for color change and rash.   Allergic/Immunologic: Negative for immunocompromised state.   Neurological: Negative for dizziness, seizures, syncope, weakness, light-headedness and headaches.   Hematological: Negative for adenopathy. Does not bruise/bleed easily.   Psychiatric/Behavioral: Negative for behavioral problems and confusion.   All other systems reviewed and are negative.      Past Medical History:   Diagnosis Date   • Anemia during pregnancy in third trimester 9/19/2017   • Anxiety    • Chest pain, unspecified    • Contact dermatitis due to poison ivy    • Exanthem due to herpes zoster    • H/O echocardiogram  12/15/2015    Normal LV function with Ef 60% without regional wall motion abnormalities. Normal Ev size with normal function. Normal diastolic function. No significant valvular regurgitation or stenosis   • Herpes zoster ophthalmicus     no ocular involvement      • History of chicken pox    • History of Holter monitoring 12/15/2015    Sinus mechanism with normal average heart rate with no evicence of chronotropic incompetence. Normal burden of ventricular and supraventricular ectopic event. Symptoms correlated only with sinus mechanism   • Migraine    • Obesity affecting pregnancy 2017   • Palpitations    • Shingles    • Temporomandibular joint disorder        No Known Allergies    Past Surgical History:   Procedure Laterality Date   •  SECTION N/A 2017    Procedure:  SECTION PRIMARY;  Surgeon: Jarad Oliver MD;  Location: [facility] LABOR DELIVERY;  Service:    • FOREIGN BODY REMOVAL Left 2017    splinter removed from left great toe. nerve block used.    • TUBAL ABDOMINAL LIGATION     • WISDOM TOOTH EXTRACTION         Family History   Problem Relation Age of Onset   • Adopted: Yes   • Hyperthyroidism Mother        Social History     Social History   • Marital status:      Spouse name: N/A   • Number of children: N/A   • Years of education: N/A     Social History Main Topics   • Smoking status: Never Smoker   • Smokeless tobacco: Never Used   • Alcohol use No   • Drug use: No   • Sexual activity: Yes     Partners: Male     Other Topics Concern   • None     Social History Narrative           Objective   Physical Exam   Constitutional: She is oriented to person, place, and time. She appears well-developed and well-nourished.   HENT:   Head: Normocephalic and atraumatic.   Nose: Nose normal.   Mouth/Throat: Oropharynx is clear and moist.   Eyes: Conjunctivae and EOM are normal. Pupils are equal, round, and reactive to light. Right eye exhibits no discharge. Left eye exhibits  no discharge. No scleral icterus.   Neck: Normal range of motion. Neck supple. No tracheal deviation present.   Cardiovascular: Normal rate, regular rhythm and normal heart sounds.    No murmur heard.  Pulmonary/Chest: Effort normal and breath sounds normal. No stridor. No respiratory distress. She has no wheezes. She has no rales.   Abdominal: Soft. Bowel sounds are normal. She exhibits no distension and no mass. There is no tenderness. There is CVA tenderness (right). There is no rebound and no guarding.   Musculoskeletal: She exhibits no edema.   Neurological: She is alert and oriented to person, place, and time. Coordination normal.   Skin: Skin is warm and dry. No rash noted. No erythema.   Psychiatric: She has a normal mood and affect. Her behavior is normal. Thought content normal.   Nursing note and vitals reviewed.      Procedures         ED Course  ED Course          Labs Reviewed   URINALYSIS W/ CULTURE IF INDICATED - Abnormal; Notable for the following:        Result Value    Appearance, UA Cloudy (*)     Blood, UA Large (3+) (*)     Protein, UA >=300 mg/dL (3+) (*)     Leuk Esterase, UA Moderate (2+) (*)     All other components within normal limits   URINALYSIS, MICROSCOPIC ONLY - Abnormal; Notable for the following:     RBC, UA 31-50 (*)     WBC, UA Too Numerous to Count (*)     Bacteria, UA 2+ (*)     Squamous Epithelial Cells, UA 3-5 (*)     All other components within normal limits   COMPREHENSIVE METABOLIC PANEL - Abnormal; Notable for the following:     ALT (SGPT) 61 (*)     AST (SGOT) 40 (*)     Total Bilirubin <0.1 (*)     All other components within normal limits   CBC WITH AUTO DIFFERENTIAL - Abnormal; Notable for the following:     Immature Grans, Absolute 0.03 (*)     All other components within normal limits   LIPASE - Normal   URINE CULTURE   RAINBOW DRAW    Narrative:     The following orders were created for panel order [place] Draw.  Procedure                               Abnormality          Status                     ---------                               -----------         ------                     Light Blue Top[[id number]]                                                              Gold Top - [facility][[id number]]                                                                Please view results for these tests on the individual orders.   CBC AND DIFFERENTIAL    Narrative:     The following orders were created for panel order CBC & Differential.  Procedure                               Abnormality         Status                     ---------                               -----------         ------                     CBC Auto Differential[[id number]]        Abnormal            Final result                 Please view results for these tests on the individual orders.   LIGHT BLUE TOP   GOLD TOP - SST       No orders to display                 MDM    Final diagnoses:   Urinary tract infection with hematuria, site unspecified            Gregg Causey MD  02/18/18 0016

## 2018-02-19 LAB
BACTERIA SPEC AEROBE CULT: ABNORMAL
BACTERIA SPEC AEROBE CULT: ABNORMAL

## 2018-04-11 ENCOUNTER — HOSPITAL ENCOUNTER (EMERGENCY)
Facility: HOSPITAL | Age: 23
Discharge: LEFT BEFORE DISCHARGE | End: 2018-04-11

## 2018-04-16 ENCOUNTER — TELEPHONE (OUTPATIENT)
Dept: OBSTETRICS AND GYNECOLOGY | Facility: CLINIC | Age: 23
End: 2018-04-16

## 2018-04-16 ENCOUNTER — OFFICE VISIT (OUTPATIENT)
Dept: OBSTETRICS AND GYNECOLOGY | Facility: CLINIC | Age: 23
End: 2018-04-16

## 2018-04-16 ENCOUNTER — LAB (OUTPATIENT)
Dept: LAB | Facility: HOSPITAL | Age: 23
End: 2018-04-16

## 2018-04-16 VITALS
BODY MASS INDEX: 37.21 KG/M2 | HEIGHT: 63 IN | DIASTOLIC BLOOD PRESSURE: 91 MMHG | WEIGHT: 210 LBS | SYSTOLIC BLOOD PRESSURE: 131 MMHG

## 2018-04-16 DIAGNOSIS — D50.0 BLOOD LOSS ANEMIA: ICD-10-CM

## 2018-04-16 DIAGNOSIS — N91.2 AMENORRHEA: ICD-10-CM

## 2018-04-16 DIAGNOSIS — Z32.00 PREGNANCY EXAMINATION OR TEST, PREGNANCY UNCONFIRMED: ICD-10-CM

## 2018-04-16 DIAGNOSIS — N91.2 AMENORRHEA: Primary | ICD-10-CM

## 2018-04-16 LAB
B-HCG UR QL: NEGATIVE
DEPRECATED RDW RBC AUTO: 39.1 FL (ref 36.4–46.3)
ERYTHROCYTE [DISTWIDTH] IN BLOOD BY AUTOMATED COUNT: 13 % (ref 11.5–14.5)
HCG SERPL QL: NEGATIVE
HCT VFR BLD AUTO: 39.5 % (ref 35–45)
HGB BLD-MCNC: 13.2 G/DL (ref 12–15.5)
INTERNAL NEGATIVE CONTROL: POSITIVE
INTERNAL POSITIVE CONTROL: POSITIVE
Lab: NORMAL
MCH RBC QN AUTO: 27.5 PG (ref 26.5–34)
MCHC RBC AUTO-ENTMCNC: 33.4 G/DL (ref 31.4–36)
MCV RBC AUTO: 82.3 FL (ref 80–98)
PLATELET # BLD AUTO: 279 10*3/MM3 (ref 150–450)
PMV BLD AUTO: 10.1 FL (ref 8–12)
RBC # BLD AUTO: 4.8 10*6/MM3 (ref 3.77–5.16)
TSH SERPL DL<=0.05 MIU/L-ACNC: 1.26 MIU/ML (ref 0.46–4.68)
WBC NRBC COR # BLD: 6.53 10*3/MM3 (ref 3.2–9.8)

## 2018-04-16 PROCEDURE — 81025 URINE PREGNANCY TEST: CPT | Performed by: OBSTETRICS & GYNECOLOGY

## 2018-04-16 PROCEDURE — 85027 COMPLETE CBC AUTOMATED: CPT

## 2018-04-16 PROCEDURE — 36415 COLL VENOUS BLD VENIPUNCTURE: CPT | Performed by: OBSTETRICS & GYNECOLOGY

## 2018-04-16 PROCEDURE — 84703 CHORIONIC GONADOTROPIN ASSAY: CPT | Performed by: OBSTETRICS & GYNECOLOGY

## 2018-04-16 PROCEDURE — 84443 ASSAY THYROID STIM HORMONE: CPT

## 2018-04-16 PROCEDURE — 99213 OFFICE O/P EST LOW 20 MIN: CPT | Performed by: OBSTETRICS & GYNECOLOGY

## 2018-04-16 PROCEDURE — 84146 ASSAY OF PROLACTIN: CPT

## 2018-04-16 NOTE — PROGRESS NOTES
Subjective   Tanja Brice is a 22 y.o. female.     Patient presents today to discuss a short interval secondary amenorrhea  Patient underwent LTCS/BTL 5 mo ago   After 2 mo of breastfeeding she reports 3 periods at regular intervals but is now several days late and has had positive and negative results on home pregnancy tests  LMP: 3/7    Notes a small amount of cramping  We discussed potential next steps including labs for confirmation regarding pregnancy status. Discussed negative UPT in office.  Patient would like to proceed with serum HCG, TSH, prolactin and requests CBC as she has discontinued iron supplementation and notes some fatigue.      Possible Pregnancy   Associated symptoms include fatigue.       The following portions of the patient's history were reviewed and updated as appropriate: allergies, current medications, past family history, past medical history, past social history, past surgical history and problem list.      Review of Systems   Constitutional: Positive for fatigue.   Genitourinary: Negative for vaginal bleeding.       Objective   Physical Exam   Constitutional: She is oriented to person, place, and time. She appears well-developed and well-nourished. No distress.   HENT:   Head: Normocephalic and atraumatic.   Right Ear: External ear normal.   Left Ear: External ear normal.   Nose: Nose normal.   Mouth/Throat: Oropharynx is clear and moist.   Neurological: She is alert and oriented to person, place, and time.   Skin: She is not diaphoretic.   Psychiatric: She has a normal mood and affect. Her behavior is normal. Judgment and thought content normal.   Vitals reviewed.      Assessment/Plan   Tanja was seen today for possible pregnancy.    Diagnoses and all orders for this visit:    Amenorrhea  -     CBC (No Diff); Future  -     hCG, Serum, Qualitative  -     Prolactin; Future  -     TSH; Future    Pregnancy examination or test, pregnancy unconfirmed  -     POC Pregnancy,  Urine  -     hCG, Serum, Qualitative    Blood loss anemia  -     CBC (No Diff); Future         CBC, TSH, HCG, Prolactin  F/u 1 mo

## 2018-04-16 NOTE — TELEPHONE ENCOUNTER
----- Message from Jarad Oliver MD sent at 4/16/2018  1:18 PM CDT -----  Please call patient to notify of negative pregnancy test based on blood sample and normal blood count. We are still waiting for her remaining results  This patient was seen today and was informed of her test results.

## 2018-04-17 LAB — PROLACTIN SERPL-MCNC: 16.1 NG/ML (ref 4.8–23.3)

## 2018-04-18 ENCOUNTER — TELEPHONE (OUTPATIENT)
Dept: OBSTETRICS AND GYNECOLOGY | Facility: CLINIC | Age: 23
End: 2018-04-18

## 2018-04-18 NOTE — TELEPHONE ENCOUNTER
----- Message from Griselda Pride sent at 4/18/2018  8:13 AM CDT -----  Contact: [phone]  Pt called and was wanting to know about her lab results. She would like a call back please.    Thanks    Called and spoke with the pt.  I informed her that all of her labs are WNL.  (TSH, prolactin, CBC) and that her serum HCG was negative.  She has a f/u appt in 4 weeks.  Pt verbalized understanding.

## 2020-07-01 ENCOUNTER — OFFICE VISIT (OUTPATIENT)
Dept: FAMILY MEDICINE CLINIC | Facility: CLINIC | Age: 25
End: 2020-07-01

## 2020-07-01 ENCOUNTER — LAB (OUTPATIENT)
Dept: LAB | Facility: HOSPITAL | Age: 25
End: 2020-07-01

## 2020-07-01 VITALS
HEIGHT: 63 IN | HEART RATE: 78 BPM | WEIGHT: 202.4 LBS | DIASTOLIC BLOOD PRESSURE: 80 MMHG | SYSTOLIC BLOOD PRESSURE: 118 MMHG | BODY MASS INDEX: 35.86 KG/M2 | OXYGEN SATURATION: 99 %

## 2020-07-01 DIAGNOSIS — K80.20 CALCULUS OF GALLBLADDER WITHOUT CHOLECYSTITIS WITHOUT OBSTRUCTION: ICD-10-CM

## 2020-07-01 DIAGNOSIS — Z00.00 ANNUAL PHYSICAL EXAM: ICD-10-CM

## 2020-07-01 DIAGNOSIS — Z76.89 ENCOUNTER TO ESTABLISH CARE: ICD-10-CM

## 2020-07-01 DIAGNOSIS — K80.50 RECURRENT BILIARY COLIC: ICD-10-CM

## 2020-07-01 DIAGNOSIS — N30.01 ACUTE CYSTITIS WITH HEMATURIA: Primary | ICD-10-CM

## 2020-07-01 LAB
ALBUMIN SERPL-MCNC: 4.6 G/DL (ref 3.5–5.2)
ALBUMIN/GLOB SERPL: 1.5 G/DL
ALP SERPL-CCNC: 67 U/L (ref 39–117)
ALT SERPL W P-5'-P-CCNC: 23 U/L (ref 1–33)
ANION GAP SERPL CALCULATED.3IONS-SCNC: 13.8 MMOL/L (ref 5–15)
AST SERPL-CCNC: 20 U/L (ref 1–32)
BASOPHILS # BLD AUTO: 0.03 10*3/MM3 (ref 0–0.2)
BASOPHILS NFR BLD AUTO: 0.5 % (ref 0–1.5)
BILIRUB BLD-MCNC: ABNORMAL MG/DL
BILIRUB SERPL-MCNC: 0.3 MG/DL (ref 0.2–1.2)
BUN SERPL-MCNC: 9 MG/DL (ref 6–20)
BUN/CREAT SERPL: 10.7 (ref 7–25)
CALCIUM SPEC-SCNC: 9.7 MG/DL (ref 8.6–10.5)
CHLORIDE SERPL-SCNC: 107 MMOL/L (ref 98–107)
CLARITY, POC: ABNORMAL
CO2 SERPL-SCNC: 22.2 MMOL/L (ref 22–29)
COLOR UR: ABNORMAL
CREAT SERPL-MCNC: 0.84 MG/DL (ref 0.57–1)
DEPRECATED RDW RBC AUTO: 39.9 FL (ref 37–54)
EOSINOPHIL # BLD AUTO: 0 10*3/MM3 (ref 0–0.4)
EOSINOPHIL NFR BLD AUTO: 0 % (ref 0.3–6.2)
ERYTHROCYTE [DISTWIDTH] IN BLOOD BY AUTOMATED COUNT: 13.4 % (ref 12.3–15.4)
GFR SERPL CREATININE-BSD FRML MDRD: 81 ML/MIN/1.73
GLOBULIN UR ELPH-MCNC: 3.1 GM/DL
GLUCOSE SERPL-MCNC: 96 MG/DL (ref 65–99)
GLUCOSE UR STRIP-MCNC: NEGATIVE MG/DL
HCT VFR BLD AUTO: 37 % (ref 34–46.6)
HGB BLD-MCNC: 12.8 G/DL (ref 12–15.9)
IMM GRANULOCYTES # BLD AUTO: 0.03 10*3/MM3 (ref 0–0.05)
IMM GRANULOCYTES NFR BLD AUTO: 0.5 % (ref 0–0.5)
KETONES UR QL: ABNORMAL
LEUKOCYTE EST, POC: ABNORMAL
LYMPHOCYTES # BLD AUTO: 2.17 10*3/MM3 (ref 0.7–3.1)
LYMPHOCYTES NFR BLD AUTO: 34.7 % (ref 19.6–45.3)
MCH RBC QN AUTO: 28.4 PG (ref 26.6–33)
MCHC RBC AUTO-ENTMCNC: 34.6 G/DL (ref 31.5–35.7)
MCV RBC AUTO: 82.2 FL (ref 79–97)
MONOCYTES # BLD AUTO: 0.47 10*3/MM3 (ref 0.1–0.9)
MONOCYTES NFR BLD AUTO: 7.5 % (ref 5–12)
NEUTROPHILS NFR BLD AUTO: 3.56 10*3/MM3 (ref 1.7–7)
NEUTROPHILS NFR BLD AUTO: 56.8 % (ref 42.7–76)
NITRITE UR-MCNC: POSITIVE MG/ML
NRBC BLD AUTO-RTO: 0 /100 WBC (ref 0–0.2)
PH UR: 5 [PH] (ref 5–8)
PLATELET # BLD AUTO: 297 10*3/MM3 (ref 140–450)
PMV BLD AUTO: 10.9 FL (ref 6–12)
POTASSIUM SERPL-SCNC: 4.6 MMOL/L (ref 3.5–5.2)
PROT SERPL-MCNC: 7.7 G/DL (ref 6–8.5)
PROT UR STRIP-MCNC: ABNORMAL MG/DL
RBC # BLD AUTO: 4.5 10*6/MM3 (ref 3.77–5.28)
RBC # UR STRIP: ABNORMAL /UL
SODIUM SERPL-SCNC: 143 MMOL/L (ref 136–145)
SP GR UR: 1.02 (ref 1–1.03)
TSH SERPL DL<=0.05 MIU/L-ACNC: 2.41 UIU/ML (ref 0.27–4.2)
UROBILINOGEN UR QL: NORMAL
WBC # BLD AUTO: 6.26 10*3/MM3 (ref 3.4–10.8)

## 2020-07-01 PROCEDURE — 87086 URINE CULTURE/COLONY COUNT: CPT | Performed by: FAMILY MEDICINE

## 2020-07-01 PROCEDURE — 87186 SC STD MICRODIL/AGAR DIL: CPT | Performed by: FAMILY MEDICINE

## 2020-07-01 PROCEDURE — 99213 OFFICE O/P EST LOW 20 MIN: CPT | Performed by: FAMILY MEDICINE

## 2020-07-01 PROCEDURE — 36415 COLL VENOUS BLD VENIPUNCTURE: CPT

## 2020-07-01 PROCEDURE — 84443 ASSAY THYROID STIM HORMONE: CPT

## 2020-07-01 PROCEDURE — 87077 CULTURE AEROBIC IDENTIFY: CPT | Performed by: FAMILY MEDICINE

## 2020-07-01 PROCEDURE — 80053 COMPREHEN METABOLIC PANEL: CPT

## 2020-07-01 PROCEDURE — 81002 URINALYSIS NONAUTO W/O SCOPE: CPT | Performed by: FAMILY MEDICINE

## 2020-07-01 PROCEDURE — 85025 COMPLETE CBC W/AUTO DIFF WBC: CPT

## 2020-07-01 RX ORDER — NITROFURANTOIN 25; 75 MG/1; MG/1
100 CAPSULE ORAL 2 TIMES DAILY
Qty: 10 CAPSULE | Refills: 0 | Status: SHIPPED | OUTPATIENT
Start: 2020-07-01 | End: 2020-07-06

## 2020-07-01 NOTE — PROGRESS NOTES
Subjective   Chief Complaint   Patient presents with   • Blood in Urine     Tanja Brice is a 25 y.o. year old presenting to [facility] care as new patient.      Additional Concerns:    Patient has concerns today due to cloudy and odorous urine.  She notes that she has had issues with urinary tract infections since having a  in 2017.  Shortly after her , she had a bad kidney infection, and has had multiple UTIs since this time.  A lot of times she notices that her urine is cloudy.  She will drink a lot of water and cranberry juice, and symptoms resolve.  He does not typically have dysuria, urinary frequency or urgency.  Last time she was treated with antibiotics for UTI was about a year ago.  Patient notes that she has urine testing done at work, and each year they tell her that she needs to be evaluated for blood in her urine.  Patient denies any fever or chills today.    She does intermittently get gas pains.  Thinks it is related to specific foods that she eats.  Severe nausea.  Notes that it usually gets worse at nighttime.  Pain in the abdomen usually radiates up into the right shoulder blade.  Patient says that episodes of pain can last anywhere from 30 minutes up to 3 hours.  Pain is severe when it does occur.  Patient used to struggle with alternating constipation/diarrhea, however this is improved and she started taking probiotics.      Past Medical History:   Diagnosis Date   • Anemia during pregnancy in third trimester 2017   • Anxiety    • Chest pain, unspecified    • Contact dermatitis due to poison ivy    • Exanthem due to herpes zoster    • H/O echocardiogram 12/15/2015    Normal LV function with Ef 60% without regional wall motion abnormalities. Normal Ev size with normal function. Normal diastolic function. No significant valvular regurgitation or stenosis   • Herpes zoster ophthalmicus     no ocular involvement      • History of chicken pox    • History of Holter  monitoring 12/15/2015    Sinus mechanism with normal average heart rate with no evicence of chronotropic incompetence. Normal burden of ventricular and supraventricular ectopic event. Symptoms correlated only with sinus mechanism   • Migraine    • Obesity affecting pregnancy 2017   • Palpitations    • Shingles    • Temporomandibular joint disorder        Past Surgical History:   Procedure Laterality Date   • FOREIGN BODY REMOVAL Left 2017    splinter removed from left great toe. nerve block used.    •  SECTION N/A 2017    Procedure:  SECTION PRIMARY;  Surgeon: Jarad Oliver MD;  Location: [facility] LABOR DELIVERY;  Service:    • TUBAL ABDOMINAL LIGATION     • WISDOM TOOTH EXTRACTION         Medications:  Current Outpatient Medications on File Prior to Visit   Medication Sig Dispense Refill   • Probiotic Product (PROBIOTIC DAILY PO) Take  by mouth Daily.       No current facility-administered medications on file prior to visit.        No Known Allergies    Family History   Adopted: Yes   Problem Relation Age of Onset   • Hyperthyroidism Mother    • Esophageal cancer Mother    • Hyperthyroidism Sister    • ADD / ADHD Brother    • ADD / ADHD Brother        Social History     Socioeconomic History   • Marital status:      Spouse name: Not on file   • Number of children: Not on file   • Years of education: Not on file   • Highest education level: Not on file   Tobacco Use   • Smoking status: Never Smoker   • Smokeless tobacco: Never Used   Substance and Sexual Activity   • Alcohol use: Yes     Comment: occasionally   • Drug use: No   • Sexual activity: Yes     Partners: Male       LMP: 2020  Menstrual Periods: Regular  Current birth control: tubal ligation  She is sexually active.  In the past 12 months there has not been new sexual partners.     Exercises regularly: yes.  Wears seat belt:yes.  Concerns about domestic violence: no.    Health Maintenance   Topic Date Due  "  • TDAP/TD VACCINES (1 - Tdap) 04/21/2004   • PAP SMEAR  05/01/2020   • INFLUENZA VACCINE  08/01/2020       Problem list was reviewed and updated as appropriate.      Review of Systems   Constitutional: Negative for appetite change and unexpected weight change.   HENT: Negative for voice change.    Eyes: Negative for visual disturbance.   Respiratory: Negative for chest tightness and shortness of breath.    Cardiovascular: Negative for chest pain and leg swelling.   Gastrointestinal: Positive for abdominal pain (intermittently). Negative for constipation and diarrhea.   Endocrine: Negative for cold intolerance and heat intolerance.   Genitourinary: Negative for difficulty urinating.   Musculoskeletal: Negative for back pain and myalgias.   Skin: Negative for rash.   Neurological: Negative for numbness and headaches.   Psychiatric/Behavioral: Negative for dysphoric mood and sleep disturbance.         Objective     /80   Pulse 78   Ht 160 cm (63\")   Wt 91.8 kg (202 lb 6.4 oz)   LMP 06/01/2020   SpO2 99%   BMI 35.85 kg/m²   Physical Exam   Constitutional: She is oriented to person, place, and time. She appears well-developed and well-nourished. No distress.   HENT:   Head: Normocephalic and atraumatic.   Nose: Nose normal.   Mouth/Throat: Oropharynx is clear and moist.   Eyes: Pupils are equal, round, and reactive to light. Conjunctivae and EOM are normal.   Neck: Normal range of motion. Neck supple. No thyromegaly present.   Cardiovascular: Normal rate, regular rhythm and normal heart sounds.   No murmur heard.  Pulmonary/Chest: Effort normal and breath sounds normal. No respiratory distress. She has no wheezes. She has no rales.   Abdominal: Soft. Bowel sounds are normal. She exhibits no distension. There is tenderness in the left lower quadrant. There is no CVA tenderness and negative Kebede's sign.   Musculoskeletal: Normal range of motion. She exhibits no edema or tenderness.   Lymphadenopathy:     " She has no cervical adenopathy.   Neurological: She is alert and oriented to person, place, and time.   Skin: Skin is warm and dry. No rash noted.   Psychiatric: She has a normal mood and affect.   Vitals reviewed.      Lab Review   Urinalysis, urine culture, CBC and CMP from 2/17/2018 reviewed    Imaging  CT of abdomen/pelvis report from 1/26/2018 reviewed         Assessment/Plan   Tanja was seen today for blood in urine.    Diagnoses and all orders for this visit:    Acute cystitis with hematuria  Symptoms consistent with UTI  Urinalysis showed blood, leukocyte esterase and nitrites  We will send urine for culture and call patient with results when available  Start treatment with Macrobid 100 mg twice daily for 5 days  Referral placed to urology given recurrence of UTI symptoms and persistent hematuria  Will ensure no abnormal kidney function on CMP today  -     POCT urinalysis dipstick, manual  -     Ambulatory Referral to Urology  -     Urine Culture - Urine, Urine, Clean Catch  -     nitrofurantoin, macrocrystal-monohydrate, (Macrobid) 100 MG capsule; Take 1 capsule by mouth 2 (Two) Times a Day for 5 days.    Calculus of gallbladder without cholecystitis without obstruction  CT scan from 2018 shows cholelithiasis  Patient having abdominal pain symptoms with radiation to the right shoulder blade consistent with biliary colic  Will refer to general surgery for consideration of cholecystectomy    Annual physical exam  Annual labs checked today  We will call patient with results when available  Patient due for Pap smear, will plan to complete at next visit  -     CBC & Differential; Future  -     Comprehensive Metabolic Panel; Future  -     TSH; Future    Encounter to establish care           Return in about 6 weeks (around 8/12/2020) for Recheck with pap smear.        This document has been electronically signed by Brooke Denise MD on July 1, 2020 08:55

## 2020-07-02 ENCOUNTER — TELEPHONE (OUTPATIENT)
Dept: FAMILY MEDICINE CLINIC | Facility: CLINIC | Age: 25
End: 2020-07-02

## 2020-07-02 NOTE — TELEPHONE ENCOUNTER
Spoke with patient.  Informed of lab results.  Let her know that I would call once urine culture sensitivities are back.  Patient says okay to leave message on voicemail if she is not available.  Thanks, SUZY Denise

## 2020-07-02 NOTE — TELEPHONE ENCOUNTER
Called to speak with patient about lab results.  No answer so left voicemail for patient to return call.  CMP, CBC and TSH were normal.  Urine culture showed greater than 100,000 gram-negative bacilli.  Sensitivities still pending.  Patient currently taking Macrobid.  Will speak with patient when she returns call.  Brandon Denies

## 2020-07-03 ENCOUNTER — TELEPHONE (OUTPATIENT)
Dept: FAMILY MEDICINE CLINIC | Facility: CLINIC | Age: 25
End: 2020-07-03

## 2020-07-03 DIAGNOSIS — N30.01 ACUTE CYSTITIS WITH HEMATURIA: Primary | ICD-10-CM

## 2020-07-03 LAB — BACTERIA SPEC AEROBE CULT: ABNORMAL

## 2020-07-03 RX ORDER — SULFAMETHOXAZOLE AND TRIMETHOPRIM 800; 160 MG/1; MG/1
1 TABLET ORAL 2 TIMES DAILY
Qty: 10 TABLET | Refills: 0 | Status: SHIPPED | OUTPATIENT
Start: 2020-07-03 | End: 2020-07-08

## 2020-07-03 NOTE — TELEPHONE ENCOUNTER
Called to speak with patient about urine culture results.  E. Coli has intermediate resistance to Macrobid.  Patient should start Bactrim DS, twice daily for 5 days.  Discontinue Macrobid.  She voiced understanding.  Brandon Denise

## 2020-07-17 ENCOUNTER — CONSULT (OUTPATIENT)
Dept: SURGERY | Facility: CLINIC | Age: 25
End: 2020-07-17

## 2020-07-17 VITALS
SYSTOLIC BLOOD PRESSURE: 116 MMHG | TEMPERATURE: 99.6 F | DIASTOLIC BLOOD PRESSURE: 74 MMHG | WEIGHT: 206.6 LBS | HEIGHT: 63 IN | BODY MASS INDEX: 36.61 KG/M2 | HEART RATE: 95 BPM

## 2020-07-17 DIAGNOSIS — K80.20 CALCULUS OF GALLBLADDER WITHOUT CHOLECYSTITIS WITHOUT OBSTRUCTION: Primary | ICD-10-CM

## 2020-07-17 PROCEDURE — 99203 OFFICE O/P NEW LOW 30 MIN: CPT | Performed by: SURGERY

## 2020-07-17 RX ORDER — SODIUM CHLORIDE, SODIUM LACTATE, POTASSIUM CHLORIDE, CALCIUM CHLORIDE 600; 310; 30; 20 MG/100ML; MG/100ML; MG/100ML; MG/100ML
100 INJECTION, SOLUTION INTRAVENOUS CONTINUOUS
Status: CANCELLED | OUTPATIENT
Start: 2020-08-13

## 2020-07-17 RX ORDER — BUPIVACAINE HCL/0.9 % NACL/PF 0.1 %
2 PLASTIC BAG, INJECTION (ML) EPIDURAL ONCE
Status: CANCELLED | OUTPATIENT
Start: 2020-08-13 | End: 2020-07-17

## 2020-07-17 NOTE — PROGRESS NOTES
Chief Complaint   Patient presents with   • Abdominal Pain     Ck abdominal pain        HPI  This woman is 25 years old presents for evaluation of epigastric and right upper quadrant abdominal pain.  She has had no fever, chills, night sweats, jaundice.  She has had some nausea and some minor vomiting.  Initial diagnosis was made in 2018 but she was asymptomatic until now.  Original imaging was done for evaluation of kidney stones.  Imaging demonstrates the following:    Study Result     Exam: CT abdomen pelvis with contrast     INDICATION: Abdominal pain     TECHNIQUE: Routine CT abdomen pelvis with contrast. Sagittal  coronal reconstructions were obtained. CT technique performed  using "[employer]     FINDINGS: The imaged portions lower lungs are clear.     The liver, spleen, pancreas and adrenal glands are unremarkable.  There are small stones in gallbladder. There is a dilated calyx  in the right kidney measuring 3.5 x 1.7 cm. There is a small  nonobstructing calculus in the right kidney. No hydronephrosis or  ureteral calculi.     Aorta is normal in caliber. No significant adenopathy. No bowel  obstruction or abnormal bowel wall thickening. Normal appendix.  No abnormal stranding in the mesentery.     The bladder and uterus are unremarkable. No adnexal mass. There  is a trace amount free fluid in the cul-de-sac.     The bony structures are intact.     IMPRESSION:  1. No obvious acute abnormality.  2. Cholelithiasis  3. Right nephrolithiasis     Electronically signed by:  Kel Judd MD  1/26/2018 1:08 AM  CST Workstation: [id number]       Past Medical History:   Diagnosis Date   • Anemia during pregnancy in third trimester 9/19/2017   • Anxiety    • Chest pain, unspecified    • Contact dermatitis due to poison ivy    • Exanthem due to herpes zoster    • H/O echocardiogram 12/15/2015    Normal LV function with Ef 60% without regional wall motion abnormalities. Normal Ev size with normal function. Normal  diastolic function. No significant valvular regurgitation or stenosis   • Herpes zoster ophthalmicus     no ocular involvement      • History of chicken pox    • History of Holter monitoring 12/15/2015    Sinus mechanism with normal average heart rate with no evicence of chronotropic incompetence. Normal burden of ventricular and supraventricular ectopic event. Symptoms correlated only with sinus mechanism   • Migraine    • Obesity affecting pregnancy 2017   • Palpitations    • Shingles    • Temporomandibular joint disorder        Past Surgical History:   Procedure Laterality Date   •  SECTION N/A 2017    Procedure:  SECTION PRIMARY;  Surgeon: Jarad Oliver MD;  Location: [facility] LABOR DELIVERY;  Service:    • FOREIGN BODY REMOVAL Left 2017    splinter removed from left great toe. nerve block used.    • TUBAL ABDOMINAL LIGATION     • WISDOM TOOTH EXTRACTION           Current Outpatient Medications:   •  Probiotic Product (PROBIOTIC DAILY PO), Take  by mouth Daily., Disp: , Rfl:     No Known Allergies    Family History   Adopted: Yes   Problem Relation Age of Onset   • Hyperthyroidism Mother    • Esophageal cancer Mother    • Hyperthyroidism Sister    • ADD / ADHD Brother    • ADD / ADHD Brother        Social History     Socioeconomic History   • Marital status:      Spouse name: Not on file   • Number of children: Not on file   • Years of education: Not on file   • Highest education level: Not on file   Tobacco Use   • Smoking status: Never Smoker   • Smokeless tobacco: Never Used   Substance and Sexual Activity   • Alcohol use: Yes     Comment: occasionally   • Drug use: No   • Sexual activity: Yes     Partners: Male       Review of Systems   Constitutional: Negative.    HENT: Negative.    Eyes: Negative.    Respiratory: Negative.    Cardiovascular: Negative.    Gastrointestinal: Positive for abdominal pain, constipation, diarrhea and nausea.         Heartburn,Intermittent back pain   Endocrine: Negative.    Genitourinary: Negative.    Musculoskeletal: Positive for back pain.   Skin: Negative.    Allergic/Immunologic: Negative.    Neurological: Negative.    Hematological: Negative.    Psychiatric/Behavioral: Negative.        Physical Exam   Constitutional: She is oriented to person, place, and time. She appears well-developed and well-nourished. No distress.   HENT:   Head: Normocephalic and atraumatic.   Mouth/Throat: No oropharyngeal exudate.   Eyes: Pupils are equal, round, and reactive to light. EOM are normal. No scleral icterus.   Neck: Normal range of motion. Neck supple. No JVD present. No tracheal deviation present. No thyromegaly present.   Cardiovascular: Normal rate, regular rhythm and normal heart sounds.   Pulmonary/Chest: Effort normal and breath sounds normal. No stridor. No respiratory distress. She has no wheezes. She has no rales.   Abdominal: Soft. Bowel sounds are normal. She exhibits no distension and no mass. There is tenderness. There is no rebound and no guarding. No hernia.   Musculoskeletal: Normal range of motion. She exhibits no edema, tenderness or deformity.   Lymphadenopathy:     She has no cervical adenopathy.     She has no axillary adenopathy.   Neurological: She is alert and oriented to person, place, and time.   Skin: Skin is warm and dry. No rash noted. She is not diaphoretic. No erythema. No pallor.   Psychiatric: She has a normal mood and affect. Her behavior is normal. Judgment normal.   Vitals reviewed.        ASSESSMENT    Tnaja was seen today for abdominal pain.    Diagnoses and all orders for this visit:    Calculus of gallbladder without cholecystitis without obstruction  -     Case Request; Standing  -     Comprehensive Metabolic Panel; Future  -     Case Request    Other orders  -     Follow anesthesia standing orders.; Future  -     Provide NPO Instructions to Patient; Future  -     Chlorhexidine Skin Prep;  Future        PLAN    1.  Laparoscopic possible open cholecystectomy with cholangiogram is planned    The following were discussed with the patient/family:    What are the indications that have led your doctor to the opinion that an operation is necessary?    * Symptoms and studies indicate that there is gallbladder disease causing pain the abdomen.    What, if any, alternative treatments are available for your condition?    * Watching and waiting with no surgery  * Removing the gallbladder through one large incision made in the abdomen.    What will be the likely result if you don't have the operation?    * Pain, infection, inflammation, and/or stones may continue or get worse    What are the basic procedures involved in the operation?    * The surgery may be done laparoscopically. Laparoscopic surgery is done using a scope and hollow tube(s) called ports. These are inserted through small cuts in the abdomen. A scope is a thin, lighted instrument with a camera attached. Tools are passed through the ports. Carbon dioxide gas is pumped into the abdomen to create workspace.   If the surgery cannot be performed with a scope, it may be done through a larger cut. This occurs 2% of the time.  The gall bladder will be removed after it is  from the liver, bile duct, and surrounding arteries. An x-ray of the bile ducts is usually performed with contrast dye injected into the ducts.  If stones are found, another procedure may be required to remove them.  A drain may rarely be inserted to keep fluid from building up in the treatment area.     What are the risks?    * Exposure to radiation. Pregnant women and women of childbearing age should talk with their doctor about this.  * Pain, numbness, swelling, weakness or scarring where tissue is cut.  * The gas used in laparoscopic procedures to inflate the abdomen can become trapped in tissues. Gas in the bloodstream can dangerously affect blood flow and  heart  function.  * The procedure may not cure or relieve your condition or symptoms. They may come back and even worsen.  * You may need additional tests or treatment.  * Bleeding. You may need blood transfusions or other treatments. This may be discovered during the procedure, or later.  * Embolism. An embolism is an object that moves through your body in your bloodstream. It can be an air bubble, a blood clot, a piece of fat or other material. It can block a blood vessel. This can lead to stroke, pulmonary embolism (blockage of the main artery of the lung), or injury to organs or extremities.  * Reactions to dye used for imaging. These may include hives, swelling of the face and/or throat, difficulty breathing, and kidney failure.  * Retained stones in bile ducts.  * Wound infection, poor healing or reopening. Blood or clear fluid can also collect at the wound site(s).  * Damage to the bile ducts or nearby structures. This may be discovered during the procedure, or later.  * Incisional hernia. Weak scar tissue may allow tissue to bulge through the cut.  * The instrument(s) placed in your abdomen can cause injuries to nearby structures.  * Death.    How is the operation expected to improve your health or quality of life?    * Operation is expected to decrease pain and nausea/vomiting associated with gallstones.  * This procedure may relieve or prevent infection, inflammation, and/or pain from stones or blockage of bile ducts.    Is hospitalization necessary and, if so, how long can you expect to be hospitalized?    * Most often, the operation is performed on an outpatient basis. Occasionally hospitalization is necessary for pain or nausea control    What can you expect during your recovery period?    * The gas used in laparoscopic procedures to inflate the abdomen can become trapped    in tissues. Shoulder pain may occur for a few days after surgery.   * Nausea and pain control are obtained with oral medication.  * If  you are on metformin, it will need to be held for 48 hours after surgery    When can you expect to resume normal activities?    * Normal activity can be resumed in 10-14 days if the procedure is performed laparoscopically. Open operations require no lifting or straining for 6 weeks.     Are there likely to be residual effects from the operation?    * Diarrhea often occurs, mostly temporary. Occasionally there is still minor food intolerance.    All questions were answered. The patient agrees to operation.                    This document has been electronically signed by Tristin Brownlee MD on July 19, 2020 10:09

## 2020-07-17 NOTE — PATIENT INSTRUCTIONS

## 2020-07-24 ENCOUNTER — TELEPHONE (OUTPATIENT)
Dept: FAMILY MEDICINE CLINIC | Facility: CLINIC | Age: 25
End: 2020-07-24

## 2020-07-24 PROCEDURE — 87086 URINE CULTURE/COLONY COUNT: CPT | Performed by: NURSE PRACTITIONER

## 2020-07-24 PROCEDURE — 87186 SC STD MICRODIL/AGAR DIL: CPT | Performed by: NURSE PRACTITIONER

## 2020-07-24 NOTE — TELEPHONE ENCOUNTER
PT called to reschedule her upcoming appointment, moved to 8/18/20. States she was also told to call back if her UTI returned. Reports completing Macrobid, but the infection returned. PT visited  urgent care this morning and was prescribed Cirpo 500 MG to combat symptoms of frequent urination with burning sensation, cloudy urine, nausea, and chills with a low grade fever.     States she just wanted to make Dr. Denise aware. Please call Tanja with any questions or concerns: [phone].

## 2020-08-10 ENCOUNTER — LAB (OUTPATIENT)
Dept: LAB | Facility: HOSPITAL | Age: 25
End: 2020-08-10

## 2020-08-10 DIAGNOSIS — Z01.818 PREOP TESTING: Primary | ICD-10-CM

## 2020-08-10 PROCEDURE — C9803 HOPD COVID-19 SPEC COLLECT: HCPCS

## 2020-08-10 PROCEDURE — U0003 INFECTIOUS AGENT DETECTION BY NUCLEIC ACID (DNA OR RNA); SEVERE ACUTE RESPIRATORY SYNDROME CORONAVIRUS 2 (SARS-COV-2) (CORONAVIRUS DISEASE [COVID-19]), AMPLIFIED PROBE TECHNIQUE, MAKING USE OF HIGH THROUGHPUT TECHNOLOGIES AS DESCRIBED BY CMS-2020-01-R: HCPCS

## 2020-08-11 LAB
COVID LABCORP PRIORITY: NORMAL
SARS-COV-2 RNA RESP QL NAA+PROBE: NOT DETECTED

## 2020-08-13 ENCOUNTER — HOSPITAL ENCOUNTER (OUTPATIENT)
Facility: HOSPITAL | Age: 25
Setting detail: HOSPITAL OUTPATIENT SURGERY
Discharge: HOME | End: 2020-08-13
Attending: SURGERY | Admitting: SURGERY

## 2020-08-13 ENCOUNTER — APPOINTMENT (OUTPATIENT)
Dept: GENERAL RADIOLOGY | Facility: HOSPITAL | Age: 25
End: 2020-08-13

## 2020-08-13 ENCOUNTER — ANESTHESIA EVENT (OUTPATIENT)
Dept: PERIOP | Facility: HOSPITAL | Age: 25
End: 2020-08-13

## 2020-08-13 ENCOUNTER — ANESTHESIA (OUTPATIENT)
Dept: PERIOP | Facility: HOSPITAL | Age: 25
End: 2020-08-13

## 2020-08-13 VITALS
HEART RATE: 82 BPM | BODY MASS INDEX: 36.29 KG/M2 | RESPIRATION RATE: 18 BRPM | HEIGHT: 63 IN | TEMPERATURE: 98 F | OXYGEN SATURATION: 98 % | WEIGHT: 204.81 LBS | DIASTOLIC BLOOD PRESSURE: 83 MMHG | SYSTOLIC BLOOD PRESSURE: 133 MMHG

## 2020-08-13 DIAGNOSIS — K80.20 CALCULUS OF GALLBLADDER WITHOUT CHOLECYSTITIS WITHOUT OBSTRUCTION: Primary | ICD-10-CM

## 2020-08-13 LAB
ALBUMIN SERPL-MCNC: 4.1 G/DL (ref 3.5–5.2)
ALBUMIN/GLOB SERPL: 1.5 G/DL
ALP SERPL-CCNC: 64 U/L (ref 39–117)
ALT SERPL W P-5'-P-CCNC: 26 U/L (ref 1–33)
ANION GAP SERPL CALCULATED.3IONS-SCNC: 12 MMOL/L (ref 5–15)
AST SERPL-CCNC: 26 U/L (ref 1–32)
BILIRUB SERPL-MCNC: 0.3 MG/DL (ref 0–1.2)
BUN SERPL-MCNC: 8 MG/DL (ref 6–20)
BUN/CREAT SERPL: 11.4 (ref 7–25)
CALCIUM SPEC-SCNC: 8.9 MG/DL (ref 8.6–10.5)
CHLORIDE SERPL-SCNC: 104 MMOL/L (ref 98–107)
CO2 SERPL-SCNC: 24 MMOL/L (ref 22–29)
CREAT SERPL-MCNC: 0.7 MG/DL (ref 0.57–1)
GFR SERPL CREATININE-BSD FRML MDRD: 100 ML/MIN/1.73
GLOBULIN UR ELPH-MCNC: 2.7 GM/DL
GLUCOSE SERPL-MCNC: 96 MG/DL (ref 65–99)
POTASSIUM SERPL-SCNC: 4.3 MMOL/L (ref 3.5–5.2)
PROT SERPL-MCNC: 6.8 G/DL (ref 6–8.5)
SODIUM SERPL-SCNC: 140 MMOL/L (ref 136–145)

## 2020-08-13 PROCEDURE — 25010000002 PROPOFOL 10 MG/ML EMULSION: Performed by: NURSE ANESTHETIST, CERTIFIED REGISTERED

## 2020-08-13 PROCEDURE — 25010000002 NEOSTIGMINE 4 MG/4ML SOLUTION PREFILLED SYRINGE: Performed by: NURSE ANESTHETIST, CERTIFIED REGISTERED

## 2020-08-13 PROCEDURE — 25010000002 KETOROLAC TROMETHAMINE PER 15 MG: Performed by: NURSE ANESTHETIST, CERTIFIED REGISTERED

## 2020-08-13 PROCEDURE — 25010000002 PHENYLEPHRINE PER 1 ML: Performed by: NURSE ANESTHETIST, CERTIFIED REGISTERED

## 2020-08-13 PROCEDURE — 76000 FLUOROSCOPY <1 HR PHYS/QHP: CPT

## 2020-08-13 PROCEDURE — 80053 COMPREHEN METABOLIC PANEL: CPT | Performed by: SURGERY

## 2020-08-13 PROCEDURE — 25010000002 IOPAMIDOL 61 % SOLUTION: Performed by: SURGERY

## 2020-08-13 PROCEDURE — 25010000002 DEXAMETHASONE PER 1 MG: Performed by: NURSE ANESTHETIST, CERTIFIED REGISTERED

## 2020-08-13 PROCEDURE — 25010000002 SUCCINYLCHOLINE PER 20 MG: Performed by: NURSE ANESTHETIST, CERTIFIED REGISTERED

## 2020-08-13 PROCEDURE — 47563 LAPARO CHOLECYSTECTOMY/GRAPH: CPT | Performed by: SURGERY

## 2020-08-13 PROCEDURE — 25010000002 HYDROMORPHONE 1 MG/ML SOLUTION: Performed by: NURSE ANESTHETIST, CERTIFIED REGISTERED

## 2020-08-13 PROCEDURE — 25010000002 ONDANSETRON PER 1 MG: Performed by: NURSE ANESTHETIST, CERTIFIED REGISTERED

## 2020-08-13 PROCEDURE — 25010000002 MIDAZOLAM PER 1 MG: Performed by: NURSE ANESTHETIST, CERTIFIED REGISTERED

## 2020-08-13 PROCEDURE — 25010000002 FENTANYL CITRATE (PF) 100 MCG/2ML SOLUTION: Performed by: NURSE ANESTHETIST, CERTIFIED REGISTERED

## 2020-08-13 RX ORDER — SODIUM CHLORIDE, SODIUM GLUCONATE, SODIUM ACETATE, POTASSIUM CHLORIDE AND MAGNESIUM CHLORIDE 526; 502; 368; 37; 30 MG/100ML; MG/100ML; MG/100ML; MG/100ML; MG/100ML
INJECTION, SOLUTION INTRAVENOUS AS NEEDED
Status: DISCONTINUED | OUTPATIENT
Start: 2020-08-13 | End: 2020-08-13

## 2020-08-13 RX ORDER — BUPIVACAINE HYDROCHLORIDE AND EPINEPHRINE 5; 5 MG/ML; UG/ML
INJECTION, SOLUTION EPIDURAL; INTRACAUDAL; PERINEURAL AS NEEDED
Status: DISCONTINUED | OUTPATIENT
Start: 2020-08-13 | End: 2020-08-13 | Stop reason: HOSPADM

## 2020-08-13 RX ORDER — ROCURONIUM BROMIDE 10 MG/ML
INJECTION, SOLUTION INTRAVENOUS AS NEEDED
Status: DISCONTINUED | OUTPATIENT
Start: 2020-08-13 | End: 2020-08-13 | Stop reason: SURG

## 2020-08-13 RX ORDER — ONDANSETRON 2 MG/ML
INJECTION INTRAMUSCULAR; INTRAVENOUS AS NEEDED
Status: DISCONTINUED | OUTPATIENT
Start: 2020-08-13 | End: 2020-08-13 | Stop reason: SURG

## 2020-08-13 RX ORDER — MIDAZOLAM HYDROCHLORIDE 1 MG/ML
INJECTION INTRAMUSCULAR; INTRAVENOUS AS NEEDED
Status: DISCONTINUED | OUTPATIENT
Start: 2020-08-13 | End: 2020-08-13 | Stop reason: SURG

## 2020-08-13 RX ORDER — SODIUM CHLORIDE, SODIUM GLUCONATE, SODIUM ACETATE, POTASSIUM CHLORIDE, AND MAGNESIUM CHLORIDE 526; 502; 368; 37; 30 MG/100ML; MG/100ML; MG/100ML; MG/100ML; MG/100ML
INJECTION, SOLUTION INTRAVENOUS CONTINUOUS PRN
Status: DISCONTINUED | OUTPATIENT
Start: 2020-08-13 | End: 2020-08-13 | Stop reason: SURG

## 2020-08-13 RX ORDER — FENTANYL CITRATE 50 UG/ML
INJECTION, SOLUTION INTRAMUSCULAR; INTRAVENOUS AS NEEDED
Status: DISCONTINUED | OUTPATIENT
Start: 2020-08-13 | End: 2020-08-13 | Stop reason: SURG

## 2020-08-13 RX ORDER — HYDROCODONE BITARTRATE AND ACETAMINOPHEN 7.5; 325 MG/1; MG/1
1 TABLET ORAL EVERY 4 HOURS PRN
Qty: 18 TABLET | Refills: 0 | Status: SHIPPED | OUTPATIENT
Start: 2020-08-13 | End: 2020-11-06

## 2020-08-13 RX ORDER — PROPOFOL 10 MG/ML
VIAL (ML) INTRAVENOUS AS NEEDED
Status: DISCONTINUED | OUTPATIENT
Start: 2020-08-13 | End: 2020-08-13 | Stop reason: SURG

## 2020-08-13 RX ORDER — KETOROLAC TROMETHAMINE 30 MG/ML
INJECTION, SOLUTION INTRAMUSCULAR; INTRAVENOUS AS NEEDED
Status: DISCONTINUED | OUTPATIENT
Start: 2020-08-13 | End: 2020-08-13 | Stop reason: SURG

## 2020-08-13 RX ORDER — BUPIVACAINE HCL/0.9 % NACL/PF 0.1 %
2 PLASTIC BAG, INJECTION (ML) EPIDURAL ONCE
Status: COMPLETED | OUTPATIENT
Start: 2020-08-13 | End: 2020-08-13

## 2020-08-13 RX ORDER — DEXAMETHASONE SODIUM PHOSPHATE 4 MG/ML
INJECTION, SOLUTION INTRA-ARTICULAR; INTRALESIONAL; INTRAMUSCULAR; INTRAVENOUS; SOFT TISSUE AS NEEDED
Status: DISCONTINUED | OUTPATIENT
Start: 2020-08-13 | End: 2020-08-13 | Stop reason: SURG

## 2020-08-13 RX ORDER — NEOSTIGMINE METHYLSULFATE 4 MG/4 ML
SYRINGE (ML) INTRAVENOUS AS NEEDED
Status: DISCONTINUED | OUTPATIENT
Start: 2020-08-13 | End: 2020-08-13 | Stop reason: SURG

## 2020-08-13 RX ORDER — ONDANSETRON 2 MG/ML
4 INJECTION INTRAMUSCULAR; INTRAVENOUS ONCE AS NEEDED
Status: DISCONTINUED | OUTPATIENT
Start: 2020-08-13 | End: 2020-08-14 | Stop reason: HOSPADM

## 2020-08-13 RX ORDER — SODIUM CHLORIDE, SODIUM LACTATE, POTASSIUM CHLORIDE, CALCIUM CHLORIDE 600; 310; 30; 20 MG/100ML; MG/100ML; MG/100ML; MG/100ML
100 INJECTION, SOLUTION INTRAVENOUS CONTINUOUS
Status: DISCONTINUED | OUTPATIENT
Start: 2020-08-13 | End: 2020-08-14 | Stop reason: HOSPADM

## 2020-08-13 RX ORDER — 0.9 % SODIUM CHLORIDE 0.9 %
VIAL (ML) INJECTION AS NEEDED
Status: DISCONTINUED | OUTPATIENT
Start: 2020-08-13 | End: 2020-08-13 | Stop reason: HOSPADM

## 2020-08-13 RX ORDER — LIDOCAINE HYDROCHLORIDE 20 MG/ML
INJECTION, SOLUTION INFILTRATION; PERINEURAL AS NEEDED
Status: DISCONTINUED | OUTPATIENT
Start: 2020-08-13 | End: 2020-08-13 | Stop reason: SURG

## 2020-08-13 RX ORDER — SUCCINYLCHOLINE CHLORIDE 20 MG/ML
INJECTION INTRAMUSCULAR; INTRAVENOUS AS NEEDED
Status: DISCONTINUED | OUTPATIENT
Start: 2020-08-13 | End: 2020-08-13 | Stop reason: SURG

## 2020-08-13 RX ADMIN — SODIUM CHLORIDE, SODIUM GLUCONATE, SODIUM ACETATE, POTASSIUM CHLORIDE, AND MAGNESIUM CHLORIDE: 526; 502; 368; 37; 30 INJECTION, SOLUTION INTRAVENOUS at 09:39

## 2020-08-13 RX ADMIN — GLYCOPYRROLATE 0.4 MG: 0.2 INJECTION, SOLUTION INTRAMUSCULAR; INTRAVITREAL at 09:59

## 2020-08-13 RX ADMIN — PROPOFOL 180 MG: 10 INJECTION, EMULSION INTRAVENOUS at 08:41

## 2020-08-13 RX ADMIN — LIDOCAINE HYDROCHLORIDE 100 MG: 20 INJECTION, SOLUTION INFILTRATION; PERINEURAL at 08:41

## 2020-08-13 RX ADMIN — MIDAZOLAM HYDROCHLORIDE 2 MG: 2 INJECTION, SOLUTION INTRAMUSCULAR; INTRAVENOUS at 08:36

## 2020-08-13 RX ADMIN — ROCURONIUM BROMIDE 30 MG: 10 INJECTION INTRAVENOUS at 08:52

## 2020-08-13 RX ADMIN — FENTANYL CITRATE 50 MCG: 50 INJECTION, SOLUTION INTRAMUSCULAR; INTRAVENOUS at 09:17

## 2020-08-13 RX ADMIN — FENTANYL CITRATE 50 MCG: 50 INJECTION, SOLUTION INTRAMUSCULAR; INTRAVENOUS at 09:05

## 2020-08-13 RX ADMIN — PHENYLEPHRINE HYDROCHLORIDE 100 MCG: 10 INJECTION INTRAVENOUS at 08:54

## 2020-08-13 RX ADMIN — Medication 3 MG: at 09:59

## 2020-08-13 RX ADMIN — SODIUM CHLORIDE, POTASSIUM CHLORIDE, SODIUM LACTATE AND CALCIUM CHLORIDE 100 ML/HR: 600; 310; 30; 20 INJECTION, SOLUTION INTRAVENOUS at 07:30

## 2020-08-13 RX ADMIN — ONDANSETRON 4 MG: 2 INJECTION INTRAMUSCULAR; INTRAVENOUS at 09:59

## 2020-08-13 RX ADMIN — FENTANYL CITRATE 50 MCG: 50 INJECTION, SOLUTION INTRAMUSCULAR; INTRAVENOUS at 10:04

## 2020-08-13 RX ADMIN — HYDROMORPHONE HYDROCHLORIDE 0.5 MG: 1 INJECTION, SOLUTION INTRAMUSCULAR; INTRAVENOUS; SUBCUTANEOUS at 10:58

## 2020-08-13 RX ADMIN — DEXAMETHASONE SODIUM PHOSPHATE 4 MG: 4 INJECTION, SOLUTION INTRAMUSCULAR; INTRAVENOUS at 08:41

## 2020-08-13 RX ADMIN — FENTANYL CITRATE 100 MCG: 50 INJECTION, SOLUTION INTRAMUSCULAR; INTRAVENOUS at 08:38

## 2020-08-13 RX ADMIN — KETOROLAC TROMETHAMINE 30 MG: 30 INJECTION, SOLUTION INTRAMUSCULAR at 09:59

## 2020-08-13 RX ADMIN — SUCCINYLCHOLINE CHLORIDE 120 MG: 20 INJECTION, SOLUTION INTRAMUSCULAR; INTRAVENOUS at 08:41

## 2020-08-13 RX ADMIN — Medication 2 G: at 08:46

## 2020-08-13 NOTE — INTERVAL H&P NOTE
H&P reviewed. The patient was examined and there are no changes to the H&P.      Temp:  [96.9 °F (36.1 °C)] 96.9 °F (36.1 °C)  Heart Rate:  [84] 84  Resp:  [18] 18  BP: (139)/(79) 139/79

## 2020-08-13 NOTE — OP NOTE
CHOLECYSTECTOMY LAPAROSCOPIC INTRAOPERATIVE CHOLANGIOGRAM  Procedure Note    Tanja Brice  8/13/2020    Pre-op Diagnosis:   Calculus of gallbladder without cholecystitis without obstruction [K80.20]    Post-op Diagnosis:     Calculus of gallbladder without cholecystitis without obstruction [K80.20]    Procedure:  LAPAROSCOPIC CHOLECYSTECTOMY WITH INTRAOPERATIVE CHOLANGIOGRAM               (c-arm#1)    Surgeon(s):  Tristin Brownlee MD    Anesthesia: General    Staff:   Circulator: Juliette Farrell RN  Scrub Person: Celeste Gaitan; Mayra Ortiz      Estimated Blood Loss: minimal    Specimens:                ID Type Source Tests Collected by Time   A : gallbladder plus contents (Pt request gallstones be returned to pt) Tissue Gallbladder TISSUE PATHOLOGY EXAM Tristin Brownlee MD 8/13/2020 0905         Drains: * No LDAs found *    Findings: Stones; normal operative cholangiogram    Complications: None    INDICATION:  This is a 25-year-old with epigastric and right upper quadrant abdominal pain. Positive  ultrasound for stones. Taken to the operating room for laparoscopic cholecystectomy.    DESCRIPTION:  The patient was brought to the operating room and placed supine on the operating table. After adequate general endotracheal anesthesia, the abdominal area was prepped and draped in a sterile manner. A briefing and timeout were performed and all parties were in agreement.     A transverse incision was made slightly to the right of the midline in the epigastrium subcostally. A 5 mm XCEL trocar was uneventfully passed into the abdomen under direct vision with no visceral injury. The abdomen was insufflated to a total of 15 mmHg, 4.8 L of CO2. A 5 mm laparoscope revealed an excellent view of the upper and lower abdominal areas. A longitudinal skin incision was made at the umbilicus and a 5 mm  XCEL trocar was placed under direct vision with no visceral injury. The camera was then moved to the umbilical port site  and an excellent view of the upper abdomen was obtained. An incision was made at the tip of the 12th rib on the right side and carried into the subcutaneous tissue. A 5 mm  XCEL trocar was uneventfully passed into the abdomen under direct vision with no visceral injury.  A fourth 5 mm  XCEL trocar was placed in the midclavicular line subcostally on the right side under direct vision.  The bed was then tilted in reverse Trendelenburg and tipped to the left. The patient tolerated the positioning and insufflation without difficulty.  The umbilical port site was upsized to an 11 mm trocar.    The gallbladder was retracted upwards and outwards by grasping the top and the infundibulum of the gallbladder with atraumatic graspers passed through the lateral ports. The peritoneum around the infundibulum was taken down for a distance of 1/3 of the length of the gallbladder on the anterior and posterior sides. The cystic duct and artery easily came into view as did the 5th segment of the liver behind these structures.     A Isaac clamp was placed across the infundibulum and a fluoroscopic cholangiogram was performed by piercing the infundibulum with a needle and injecting contrast. This demonstrated good filling proximally and distally, intact bile ducts, no stones in the common duct, and good emptying into the duodenum.     The cholangiocath was removed and the cystic duct was doubly clipped and divided. The cystic artery was doubly clipped and divided in all branches. The gallbladder was then dissected out of the liver bed using electrocautery. Once it had been nearly completely excised, pressure in the abdomen was reduced to 8 mmHg with no further bleeding being noted from the liver bed. The remainder of the gallbladder was dissected free.  It was placed into an Endobag and brought out intact through the epigastric port.     All areas were visualized for bleeding and bile leak. None was seen. The patient was then placed  supine.  11 mm trocar site was closed with an Endo Close device and 0 Vicryl suture.  Trocars were removed and the abdomen was allowed to flatten. All port sites were closed with 4-0 subcuticular on the skin.    The procedure was terminated. It was well tolerated. Sponge and needle counts were correct, and the patient was transferred to recovery in satisfactory condition.            This document has been electronically signed by Tristin Brownlee MD on August 13, 2020 10:11       Date: 8/13/2020  Time: 10:11

## 2020-08-13 NOTE — ANESTHESIA PROCEDURE NOTES
Airway  Urgency: elective    Date/Time: 8/13/2020 8:42 AM  Airway not difficult    General Information and Staff    Patient location during procedure: OR  CRNA: Mynor Black CRNA    Indications and Patient Condition  Indications for airway management: airway protection    Preoxygenated: yes  Mask difficulty assessment: 0 - not attempted    Final Airway Details  Final airway type: endotracheal airway      Successful airway: ETT    Successful intubation technique: RSI and direct laryngoscopy  Facilitating devices/methods: intubating stylet and cricoid pressure  Endotracheal tube insertion site: oral  Blade: Anders  Blade size: 3  ETT size (mm): 7.5  Cormack-Lehane Classification: grade I - full view of glottis  Placement verified by: chest auscultation and capnometry   Measured from: lips  ETT/EBT  to lips (cm): 21  Number of attempts at approach: 1  Assessment: lips, teeth, and gum same as pre-op and atraumatic intubation

## 2020-08-13 NOTE — DISCHARGE INSTRUCTIONS
Dr. Tristin Brownlee  [address] [place] [address]  [phone] (office)  [phone] (hospital)  [phone] (cell)  Discharge Instructions for Gall Bladder Surgery      1. Go home, rest and take it easy today; however, you should get up and move about several times today to reduce the risk of developing a clot in your legs.    2. You may experience some dizziness or memory loss from the anesthesia.  This may last for the next 24 hours.  Someone should plan on staying with you for the first 24 hours for your safety.  3. Do not make any important legal decisions or sign any legal papers for the next 24 hours.    4. Eat and drink lightly today.  Start off with liquids, jello, soup, crackers or other bland foods at first. You may advance your diet tomorrow as tolerated as long as you do not experience any nausea or vomiting.   5. You may remove your outer dressings in 3 days.  The white tapes called steri-strips should stay in place.  They will fall off on their own in 1-2 weeks.  Do not worry if they come off sooner.    6. You may notice some bleeding/drainage on your outer dressings. A little bloody drainage is normal. If the bleeding/drainage is such that the bandage cannot absorb it, remove the dressing, apply clean gauze and apply firm pressure for a full 15 minutes.  If the bleeding continues, please call me.  7. You may shower tomorrow.  No tub baths for at least 1 week until your incisions are completely healed.       8. You have received a prescription for a narcotic pain medicine, as you will have some pain following surgery.   You will not be totally pain free, but your pain medicine should make the pain tolerable.  Please take your pain medicine as prescribed and always take your pills with food to prevent nausea. If you are having severe pain that cannot be controlled by the pain medicine, please contact me.    9. If needed, you may receive a prescription for an anti-nausea medicine.  Please take  this as prescribed for any nausea or vomiting.  Nausea could be a result of the anesthesia or a result of the narcotic pain medicine.  If you experience severe nausea and vomiting that cannot be controlled by the nausea medicine, please call me.    10. It is not unusual to experience pain/discomfort in your shoulders or under your ribs after surgery.  It is from the gas used during the laparoscopic procedure and usually lasts 1-3 days.  The prescription pain medicine is used to treat the surgical pain and does not typically alleviate this “gassy” pain.   11. No driving for 24 hours and for as long as you are taking your prescription pain medicine.     12. If an appointment is not given to you before discharge, you will need to call the office  at [phone] to schedule a follow-up appointment in 5-7 days.   13. Remember to contact me for any of the following:    • Fever > 101 degrees  • Severe pain that cannot be controlled by taking your pain pills  • Severe nausea or vomiting that cannot be controlled by taking your nausea pills  • Significant bleeding of your incisions  • Drainage that has a bad smell or is yellow or green in appearance  • Any other questions or concerns

## 2020-08-13 NOTE — ANESTHESIA PREPROCEDURE EVALUATION
Anesthesia Evaluation     Patient summary reviewed and Nursing notes reviewed   no history of anesthetic complications:  NPO Solid Status: > 8 hours  NPO Liquid Status: > 8 hours           Airway   Mallampati: II  TM distance: <3 FB  Neck ROM: full  Large neck circumference  Dental    (+) poor dentition    Pulmonary - negative pulmonary ROS and normal exam    breath sounds clear to auscultation  (-) shortness of breath  Cardiovascular - negative cardio ROS and normal exam  Exercise tolerance: good (4-7 METS)    Rhythm: regular  Rate: normal    (-) CAD, angina, orthopnea, ALVARENGA      Neuro/Psych  (+) headaches, numbness, psychiatric history Anxiety,     (-) CVA  GI/Hepatic/Renal/Endo    (+) obesity, morbid obesity, GERD poorly controlled,      Musculoskeletal (-) negative ROS    Abdominal   (+) obese,     Abdomen: tender.   Substance History - negative use     OB/GYN    (-)  Pregnant        Other - negative ROS                       Anesthesia Plan    ASA 2     general   Rapid sequence  intravenous induction     Anesthetic plan, all risks, benefits, and alternatives have been provided, discussed and informed consent has been obtained with: patient.

## 2020-08-15 ENCOUNTER — HOSPITAL ENCOUNTER (EMERGENCY)
Facility: HOSPITAL | Age: 25
Discharge: HOME | End: 2020-08-15
Attending: EMERGENCY MEDICINE | Admitting: EMERGENCY MEDICINE

## 2020-08-15 ENCOUNTER — APPOINTMENT (OUTPATIENT)
Dept: CT IMAGING | Facility: HOSPITAL | Age: 25
End: 2020-08-15

## 2020-08-15 ENCOUNTER — APPOINTMENT (OUTPATIENT)
Dept: GENERAL RADIOLOGY | Facility: HOSPITAL | Age: 25
End: 2020-08-15

## 2020-08-15 VITALS
HEIGHT: 63 IN | WEIGHT: 200 LBS | HEART RATE: 102 BPM | RESPIRATION RATE: 20 BRPM | SYSTOLIC BLOOD PRESSURE: 133 MMHG | BODY MASS INDEX: 35.44 KG/M2 | DIASTOLIC BLOOD PRESSURE: 80 MMHG | OXYGEN SATURATION: 96 % | TEMPERATURE: 100.5 F

## 2020-08-15 DIAGNOSIS — R50.82 POST-PROCEDURAL FEVER: Primary | ICD-10-CM

## 2020-08-15 DIAGNOSIS — R10.84 GENERALIZED ABDOMINAL PAIN: ICD-10-CM

## 2020-08-15 LAB
ALBUMIN SERPL-MCNC: 4.5 G/DL (ref 3.5–5.2)
ALBUMIN/GLOB SERPL: 1.4 G/DL
ALP SERPL-CCNC: 71 U/L (ref 39–117)
ALT SERPL W P-5'-P-CCNC: 40 U/L (ref 1–33)
ANION GAP SERPL CALCULATED.3IONS-SCNC: 14 MMOL/L (ref 5–15)
AST SERPL-CCNC: 25 U/L (ref 1–32)
BACTERIA UR QL AUTO: ABNORMAL /HPF
BASOPHILS # BLD AUTO: 0.05 10*3/MM3 (ref 0–0.2)
BASOPHILS NFR BLD AUTO: 0.3 % (ref 0–1.5)
BILIRUB SERPL-MCNC: 0.7 MG/DL (ref 0–1.2)
BILIRUB UR QL STRIP: NEGATIVE
BUN SERPL-MCNC: 7 MG/DL (ref 6–20)
BUN/CREAT SERPL: 9.7 (ref 7–25)
CALCIUM SPEC-SCNC: 9.3 MG/DL (ref 8.6–10.5)
CHLORIDE SERPL-SCNC: 100 MMOL/L (ref 98–107)
CLARITY UR: CLEAR
CO2 SERPL-SCNC: 23 MMOL/L (ref 22–29)
COLOR UR: YELLOW
CREAT SERPL-MCNC: 0.72 MG/DL (ref 0.57–1)
D-LACTATE SERPL-SCNC: 1 MMOL/L (ref 0.5–2)
DEPRECATED RDW RBC AUTO: 38.4 FL (ref 37–54)
EOSINOPHIL # BLD AUTO: 0 10*3/MM3 (ref 0–0.4)
EOSINOPHIL NFR BLD AUTO: 0 % (ref 0.3–6.2)
ERYTHROCYTE [DISTWIDTH] IN BLOOD BY AUTOMATED COUNT: 12.8 % (ref 12.3–15.4)
GFR SERPL CREATININE-BSD FRML MDRD: 97 ML/MIN/1.73
GLOBULIN UR ELPH-MCNC: 3.2 GM/DL
GLUCOSE SERPL-MCNC: 129 MG/DL (ref 65–99)
GLUCOSE UR STRIP-MCNC: NEGATIVE MG/DL
HCT VFR BLD AUTO: 39.3 % (ref 34–46.6)
HGB BLD-MCNC: 12.9 G/DL (ref 12–15.9)
HGB UR QL STRIP.AUTO: NEGATIVE
HOLD SPECIMEN: NORMAL
HOLD SPECIMEN: NORMAL
HYALINE CASTS UR QL AUTO: ABNORMAL /LPF
IMM GRANULOCYTES # BLD AUTO: 0.06 10*3/MM3 (ref 0–0.05)
IMM GRANULOCYTES NFR BLD AUTO: 0.4 % (ref 0–0.5)
KETONES UR QL STRIP: ABNORMAL
LEUKOCYTE ESTERASE UR QL STRIP.AUTO: ABNORMAL
LIPASE SERPL-CCNC: 85 U/L (ref 13–60)
LYMPHOCYTES # BLD AUTO: 1.37 10*3/MM3 (ref 0.7–3.1)
LYMPHOCYTES NFR BLD AUTO: 9.5 % (ref 19.6–45.3)
MCH RBC QN AUTO: 27.2 PG (ref 26.6–33)
MCHC RBC AUTO-ENTMCNC: 32.8 G/DL (ref 31.5–35.7)
MCV RBC AUTO: 82.7 FL (ref 79–97)
MONOCYTES # BLD AUTO: 1.06 10*3/MM3 (ref 0.1–0.9)
MONOCYTES NFR BLD AUTO: 7.4 % (ref 5–12)
NEUTROPHILS NFR BLD AUTO: 11.88 10*3/MM3 (ref 1.7–7)
NEUTROPHILS NFR BLD AUTO: 82.4 % (ref 42.7–76)
NITRITE UR QL STRIP: NEGATIVE
NRBC BLD AUTO-RTO: 0 /100 WBC (ref 0–0.2)
PH UR STRIP.AUTO: 6.5 [PH] (ref 5–9)
PLATELET # BLD AUTO: 337 10*3/MM3 (ref 140–450)
PMV BLD AUTO: 9.7 FL (ref 6–12)
POTASSIUM SERPL-SCNC: 4 MMOL/L (ref 3.5–5.2)
PROT SERPL-MCNC: 7.7 G/DL (ref 6–8.5)
PROT UR QL STRIP: ABNORMAL
RBC # BLD AUTO: 4.75 10*6/MM3 (ref 3.77–5.28)
RBC # UR: ABNORMAL /HPF
REF LAB TEST METHOD: ABNORMAL
SODIUM SERPL-SCNC: 137 MMOL/L (ref 136–145)
SP GR UR STRIP: 1.02 (ref 1–1.03)
SQUAMOUS #/AREA URNS HPF: ABNORMAL /HPF
UROBILINOGEN UR QL STRIP: ABNORMAL
WBC # BLD AUTO: 14.42 10*3/MM3 (ref 3.4–10.8)
WBC UR QL AUTO: ABNORMAL /HPF
WHOLE BLOOD HOLD SPECIMEN: NORMAL
WHOLE BLOOD HOLD SPECIMEN: NORMAL

## 2020-08-15 PROCEDURE — 0 DIATRIZOATE MEGLUMINE & SODIUM PER 1 ML: Performed by: EMERGENCY MEDICINE

## 2020-08-15 PROCEDURE — 81001 URINALYSIS AUTO W/SCOPE: CPT | Performed by: EMERGENCY MEDICINE

## 2020-08-15 PROCEDURE — 80053 COMPREHEN METABOLIC PANEL: CPT | Performed by: EMERGENCY MEDICINE

## 2020-08-15 PROCEDURE — 99283 EMERGENCY DEPT VISIT LOW MDM: CPT

## 2020-08-15 PROCEDURE — 83690 ASSAY OF LIPASE: CPT | Performed by: EMERGENCY MEDICINE

## 2020-08-15 PROCEDURE — 25010000002 IOPAMIDOL 61 % SOLUTION: Performed by: EMERGENCY MEDICINE

## 2020-08-15 PROCEDURE — 83605 ASSAY OF LACTIC ACID: CPT | Performed by: EMERGENCY MEDICINE

## 2020-08-15 PROCEDURE — 87040 BLOOD CULTURE FOR BACTERIA: CPT | Performed by: EMERGENCY MEDICINE

## 2020-08-15 PROCEDURE — 71045 X-RAY EXAM CHEST 1 VIEW: CPT

## 2020-08-15 PROCEDURE — 85025 COMPLETE CBC W/AUTO DIFF WBC: CPT | Performed by: EMERGENCY MEDICINE

## 2020-08-15 PROCEDURE — 74177 CT ABD & PELVIS W/CONTRAST: CPT

## 2020-08-15 RX ORDER — SODIUM CHLORIDE 0.9 % (FLUSH) 0.9 %
10 SYRINGE (ML) INJECTION AS NEEDED
Status: DISCONTINUED | OUTPATIENT
Start: 2020-08-15 | End: 2020-08-15 | Stop reason: HOSPADM

## 2020-08-15 RX ORDER — ACETAMINOPHEN 325 MG/1
650 TABLET ORAL ONCE
Status: COMPLETED | OUTPATIENT
Start: 2020-08-15 | End: 2020-08-15

## 2020-08-15 RX ADMIN — DIATRIZOATE MEGLUMINE AND DIATRIZOATE SODIUM 30 ML: 660; 100 LIQUID ORAL; RECTAL at 05:37

## 2020-08-15 RX ADMIN — ACETAMINOPHEN 650 MG: 325 TABLET, FILM COATED ORAL at 03:40

## 2020-08-15 RX ADMIN — IOPAMIDOL 90 ML: 612 INJECTION, SOLUTION INTRAVENOUS at 05:37

## 2020-08-15 RX ADMIN — SODIUM CHLORIDE 1000 ML: 9 INJECTION, SOLUTION INTRAVENOUS at 03:40

## 2020-08-15 NOTE — ED NOTES
Pt ambulatory to the restroom at this time. Gait steady. No distress noted.      Savita Vázquez RN  08/15/20 9802

## 2020-08-15 NOTE — ED PROVIDER NOTES
Subjective   Patient presents emergency department complaint that she is having fever at home.  Patient had a fever 200.6 at home, which is verified the emergency department with a fever 100.5 on arrival.  Patient has had some mild constipation though does not feel the urge to go right now states that she has not eaten that much.  Patient notes pain on eating which is slowed her eating.  Patient has been drinking some.  Patient notes that she has had abdominal aches since she was discharged though she states this pain is been constant really has not changed in the last 12 hours.  There is no nausea vomiting.  No diarrhea or bowel movements at all.  Patient has had a mild cough and bringing up some yellow sputum, though not at this time.  Patient is had no dysuria.          Review of Systems   Constitutional: Positive for fever. Negative for appetite change and chills.   HENT: Negative.  Negative for congestion.    Eyes: Negative.  Negative for photophobia and visual disturbance.   Respiratory: Negative.  Negative for cough, chest tightness and shortness of breath.    Cardiovascular: Negative.  Negative for chest pain and palpitations.   Gastrointestinal: Positive for abdominal pain and constipation. Negative for diarrhea, nausea and vomiting.   Endocrine: Negative.    Genitourinary: Negative.  Negative for decreased urine volume, dysuria, flank pain and hematuria.   Musculoskeletal: Negative.  Negative for arthralgias, back pain, myalgias, neck pain and neck stiffness.   Skin: Negative.  Negative for pallor.   Neurological: Negative.  Negative for dizziness, syncope, weakness, light-headedness, numbness and headaches.   Psychiatric/Behavioral: Negative.  Negative for confusion and suicidal ideas. The patient is not nervous/anxious.    All other systems reviewed and are negative.      Past Medical History:   Diagnosis Date   • Anemia during pregnancy in third trimester 9/19/2017   • Anxiety    • Chest pain,  unspecified    • Contact dermatitis due to poison ivy    • Exanthem due to herpes zoster    • H/O echocardiogram 12/15/2015    Normal LV function with Ef 60% without regional wall motion abnormalities. Normal Ev size with normal function. Normal diastolic function. No significant valvular regurgitation or stenosis   • Herpes zoster ophthalmicus     no ocular involvement      • History of chicken pox    • History of Holter monitoring 12/15/2015    Sinus mechanism with normal average heart rate with no evicence of chronotropic incompetence. Normal burden of ventricular and supraventricular ectopic event. Symptoms correlated only with sinus mechanism   • Migraine    • Obesity affecting pregnancy 2017   • Palpitations    • Shingles    • Temporomandibular joint disorder        No Known Allergies    Past Surgical History:   Procedure Laterality Date   •  SECTION N/A 2017    Procedure:  SECTION PRIMARY;  Surgeon: Jarad Oliver MD;  Location: [facility] LABOR DELIVERY;  Service:    • FOREIGN BODY REMOVAL Left 2017    splinter removed from left great toe. nerve block used.    • TUBAL ABDOMINAL LIGATION     • WISDOM TOOTH EXTRACTION         Family History   Adopted: Yes   Problem Relation Age of Onset   • Hyperthyroidism Mother    • Esophageal cancer Mother    • Hyperthyroidism Sister    • ADD / ADHD Brother    • ADD / ADHD Brother        Social History     Socioeconomic History   • Marital status:      Spouse name: Not on file   • Number of children: Not on file   • Years of education: Not on file   • Highest education level: Not on file   Tobacco Use   • Smoking status: Never Smoker   • Smokeless tobacco: Never Used   Substance and Sexual Activity   • Alcohol use: Yes     Comment: occasionally   • Drug use: No   • Sexual activity: Yes     Partners: Male     Birth control/protection: Surgical           Objective   Physical Exam   Constitutional: She is oriented to person, place, and  time. She appears well-developed and well-nourished.  Non-toxic appearance. She does not appear ill. No distress.   HENT:   Head: Normocephalic and atraumatic.   Nose: Nose normal.   Mouth/Throat: Oropharynx is clear and moist.   Eyes: Conjunctivae and EOM are normal. No scleral icterus.   Neck: Normal range of motion. Neck supple. No JVD present.   Cardiovascular: Regular rhythm, normal heart sounds and intact distal pulses. Exam reveals no gallop and no friction rub.   No murmur heard.  Tachycardia   Pulmonary/Chest: Effort normal. No stridor. No respiratory distress. She has no wheezes. She has no rhonchi. She has no rales. She exhibits no tenderness.   Abdominal: Soft. She exhibits no distension and no mass. There is generalized tenderness. There is no rigidity, no rebound and no guarding.   Musculoskeletal: Normal range of motion. She exhibits no edema, tenderness or deformity.   Lymphadenopathy:     She has no cervical adenopathy.   Neurological: She is alert and oriented to person, place, and time. No cranial nerve deficit. She exhibits normal muscle tone.   Skin: Skin is warm and dry. Capillary refill takes less than 2 seconds. No rash noted. She is not diaphoretic. No erythema. No pallor.   Psychiatric: She has a normal mood and affect. Her behavior is normal. Judgment and thought content normal.   Nursing note and vitals reviewed.      Procedures           ED Course  ED Course as of Aug 15 0622   Sat Aug 15, 2020   0606 Total Protein: 7.7 [PC]      ED Course User Index  [PC] Aly Monson MD                                 Labs Reviewed   COMPREHENSIVE METABOLIC PANEL - Abnormal; Notable for the following components:       Result Value    Glucose 129 (*)     ALT (SGPT) 40 (*)     All other components within normal limits    Narrative:     GFR Normal >60  Chronic Kidney Disease <60  Kidney Failure <15     LIPASE - Abnormal; Notable for the following components:    Lipase 85 (*)     All other components  within normal limits   URINALYSIS W/ MICROSCOPIC IF INDICATED (NO CULTURE) - Abnormal; Notable for the following components:    Ketones, UA 15 mg/dL (1+) (*)     Protein, UA Trace (*)     Leuk Esterase, UA Small (1+) (*)     All other components within normal limits   CBC WITH AUTO DIFFERENTIAL - Abnormal; Notable for the following components:    WBC 14.42 (*)     Neutrophil % 82.4 (*)     Lymphocyte % 9.5 (*)     Eosinophil % 0.0 (*)     Neutrophils, Absolute 11.88 (*)     Monocytes, Absolute 1.06 (*)     Immature Grans, Absolute 0.06 (*)     All other components within normal limits   URINALYSIS, MICROSCOPIC ONLY - Abnormal; Notable for the following components:    RBC, UA 6-12 (*)     WBC, UA 6-12 (*)     Bacteria, UA 1+ (*)     Squamous Epithelial Cells, UA 21-30 (*)     All other components within normal limits   LACTIC ACID, PLASMA - Normal   BLOOD CULTURE   BLOOD CULTURE   RAINBOW DRAW    Narrative:     The following orders were created for panel order [place] Draw.  Procedure                               Abnormality         Status                     ---------                               -----------         ------                     Light Blue Top[[id number]]                                   Final result               Green Top (Gel)[[id number]]                                  Final result               Lavender Top[[id number]]                                     Final result               Gold Top - SST[[id number]]                                   Final result                 Please view results for these tests on the individual orders.   CBC AND DIFFERENTIAL    Narrative:     The following orders were created for panel order CBC & Differential.  Procedure                               Abnormality         Status                     ---------                               -----------         ------                     CBC Auto Differential[[id number]]        Abnormal            Final result                  Please view results for these tests on the individual orders.   LIGHT BLUE TOP   GREEN TOP   LAVENDER TOP   GOLD TOP - SST       CT Abdomen Pelvis With Contrast   Final Result   1. Findings in the abdomen may all be postoperative in nature   although cannot exclude component of pancreatitis and please   correlate clinically.   2. Mild increased fluid distention of the proximal colon without   obstruction or significant constipation.   3. Right nephrolithiasis.      Electronically signed by:  Joe Hayden  8/15/2020 6:06 AM   CDT Workstation: [id number]      XR Chest 1 View   Final Result   No acute disease.      Electronically signed by:  Joe Hayden  8/15/2020 4:01 AM   CDT Workstation: [id number]        Patient with mild low-grade fever.  Is tolerant of oral intake without nausea or vomiting at this time.  No diarrhea.  No peritoneal signs.  CT scan showing postoperative changes though no concerning fluid collections.  No evidence of ileus or obstruction.  Patient be treated symptomatically at this point with interval follow-up.  Encouraged to drink to maintain hydration.  Tachycardia improved.  Case discussed with Dr. Joe, CT and lab findings reviewed.  Instructions given to the patient to continue current meds with interval follow-up        MDM    Final diagnoses:   Post-procedural fever   Generalized abdominal pain            Aly Monson MD  08/15/20 1841

## 2020-08-18 LAB
LAB AP CASE REPORT: NORMAL
LAB AP CLINICAL INFORMATION: NORMAL
PATH REPORT.FINAL DX SPEC: NORMAL

## 2020-08-20 ENCOUNTER — OFFICE VISIT (OUTPATIENT)
Dept: SURGERY | Facility: CLINIC | Age: 25
End: 2020-08-20

## 2020-08-20 VITALS
OXYGEN SATURATION: 92 % | BODY MASS INDEX: 35.44 KG/M2 | HEART RATE: 90 BPM | WEIGHT: 200 LBS | TEMPERATURE: 98 F | DIASTOLIC BLOOD PRESSURE: 78 MMHG | RESPIRATION RATE: 18 BRPM | HEIGHT: 63 IN | SYSTOLIC BLOOD PRESSURE: 112 MMHG

## 2020-08-20 DIAGNOSIS — Z90.49 S/P CHOLECYSTECTOMY: Primary | ICD-10-CM

## 2020-08-20 LAB — BACTERIA SPEC AEROBE CULT: NORMAL

## 2020-08-20 PROCEDURE — 99024 POSTOP FOLLOW-UP VISIT: CPT | Performed by: NURSE PRACTITIONER

## 2020-08-20 NOTE — PATIENT INSTRUCTIONS

## 2020-08-20 NOTE — PROGRESS NOTES
"CHIEF COMPLAINT:   Chief Complaint   Patient presents with   • Post-op     lap jessica       HPI: This patient presents for a post-operative visit after undergoing a laparoscopic cholecystectomy.  Patient reports no problems. Eating well without any significant nausea. Having good bowel function. No problems with constipation or diarrhea. No urinary complaints. Denies fever. Ambulating well and slowly returning to normal activities. She said Saturday she went to the ER because she had a fever off 100.6 and felt really \"off\". She said she received IV fluids and Tylenol and was discharged.  She states feeling back to normal now.      PATHOLOGY:         PHYSICAL EXAM:    ABD: Incisions are healing well without any erythema or signs of infection.    Patient's Body mass index is 35.43 kg/m². BMI is above normal parameters. Recommendations include: educational material.      ASSESSMENT:    Tanja was seen today for post-op.    Diagnoses and all orders for this visit:    S/P cholecystectomy        PLAN:    1.The patient will follow-up on a prn basis unless there are any problems.  2. May shower.   3. May return to normal activity without restrictions.                      This document has been electronically signed by TERESA Naranjo on August 20, 2020 09:52     "

## 2020-11-06 ENCOUNTER — OFFICE VISIT (OUTPATIENT)
Dept: FAMILY MEDICINE CLINIC | Facility: CLINIC | Age: 25
End: 2020-11-06

## 2020-11-06 VITALS
BODY MASS INDEX: 36.86 KG/M2 | DIASTOLIC BLOOD PRESSURE: 86 MMHG | SYSTOLIC BLOOD PRESSURE: 120 MMHG | HEART RATE: 92 BPM | WEIGHT: 208 LBS | HEIGHT: 63 IN | OXYGEN SATURATION: 98 %

## 2020-11-06 DIAGNOSIS — F90.2 ATTENTION DEFICIT HYPERACTIVITY DISORDER (ADHD), COMBINED TYPE: Primary | ICD-10-CM

## 2020-11-06 DIAGNOSIS — N39.0 RECURRENT UTI: ICD-10-CM

## 2020-11-06 PROCEDURE — 99213 OFFICE O/P EST LOW 20 MIN: CPT | Performed by: FAMILY MEDICINE

## 2020-11-06 RX ORDER — ATOMOXETINE 40 MG/1
40 CAPSULE ORAL DAILY
Qty: 30 CAPSULE | Refills: 0 | Status: SHIPPED | OUTPATIENT
Start: 2020-11-06 | End: 2020-11-23

## 2020-11-06 NOTE — PROGRESS NOTES
Follow Up Office Visit        Tanja Brice is a 25 y.o. female that presents today for   Chief Complaint   Patient presents with   • ADHD       HPI    Recurrent UTIs  Patient has had consultation and follow up appointments with urology - Dr. Diaz office.  States that she has been given a few different diagnosis.  Ureteroscopy done, and he thought her symptoms was from anatomic changes after childbirth.  She was advised to take a cranberry supplement and increase fluid intake.  Patient has been drinking quite a bit more water.  She notes that her symptoms have improved with these recommendations.  Next follow-up with urology office is in 6 months, or sooner if needed.     ADHD  Patient has new concerns regarding her ADHD.  She notes that she was diagnosed as a kid.  In her adult years, she has tried to deal with it for the most part.  Patient notes that it is starting to interfere with her daily activities.  She forgets stuff all the time.  Patient has difficulty sitting still.  Her symptoms interfere with her life at home and work.  Patient received ADHD diagnosis from psychiatry evaluation as a child.  She did struggle in school, but managed to maintain B's and C's.  Both of her brothers have ADHD as well.  Patient would like to consider medication at this time.  She has not been on anything in the past.      The following portions of the patient's history were reviewed and updated as appropriate: allergies, current medications, past family history and problem list.    Review of Systems   Constitutional: Negative for chills and fever.   HENT: Negative for congestion and sinus pain.    Respiratory: Negative for cough and shortness of breath.    Cardiovascular: Negative for chest pain and leg swelling.   Gastrointestinal: Negative for abdominal pain, diarrhea, nausea and vomiting.   Musculoskeletal: Negative for back pain, gait problem and joint swelling.   Skin: Negative for rash.   Neurological:  "Negative for weakness and headaches.   Psychiatric/Behavioral: Positive for decreased concentration. Negative for dysphoric mood and sleep disturbance. The patient is hyperactive.            Vitals:    11/06/20 1003   BP: 120/86   Pulse: 92   SpO2: 98%   Weight: 94.3 kg (208 lb)   Height: 160 cm (63\")     Body mass index is 36.85 kg/m².    Physical Exam  Vitals signs reviewed.   Constitutional:       General: She is not in acute distress.     Appearance: She is well-developed.   HENT:      Head: Normocephalic and atraumatic.   Neck:      Musculoskeletal: Neck supple.   Cardiovascular:      Rate and Rhythm: Normal rate and regular rhythm.      Heart sounds: Normal heart sounds. No murmur.   Pulmonary:      Effort: Pulmonary effort is normal. No respiratory distress.      Breath sounds: Normal breath sounds. No wheezing or rales.   Abdominal:      Palpations: Abdomen is soft.      Tenderness: There is no abdominal tenderness.   Skin:     General: Skin is warm and dry.      Findings: No rash.   Neurological:      Mental Status: She is alert and oriented to person, place, and time.         Assessment/Plan   Diagnoses and all orders for this visit:    1. Attention deficit hyperactivity disorder (ADHD), combined type (Primary)  Diagnosis of ADHD as a child  Patient symptoms that she complains of now are consistent with ADHD  Risk and benefits of ADHD medications discussed  Patient will try Strattera 40 mg daily  Additional information about this medication beyond what we discussed in the office was provided to patient as part of after visit summary, and she was encouraged to read this  -     atomoxetine (Strattera) 40 MG capsule; Take 1 capsule by mouth Daily.  Dispense: 30 capsule; Refill: 0    2. Recurrent UTI  Following with urology  Improved symptoms with cranberry supplement and increase fluid intake  Patient should continue these measures          This document has been electronically signed by Brooke Denise MD " on November 6, 2020 10:11 CST

## 2020-11-06 NOTE — PATIENT INSTRUCTIONS
Atomoxetine capsules  What is this medicine?  ATOMOXETINE (AT oh mox e teen) is used to treat attention deficit/hyperactivity disorder, also known as ADHD. It is not a stimulant like other drugs for ADHD. This drug can improve attention span, concentration, and emotional control. It can also reduce restless or overactive behavior.  This medicine may be used for other purposes; ask your health care provider or pharmacist if you have questions.  COMMON BRAND NAME(S): Strattera  What should I tell my health care provider before I take this medicine?  They need to know if you have any of these conditions:  · glaucoma  · high or low blood pressure  · history of stroke  · irregular heartbeat or other cardiac disease  · liver disease  · marty or bipolar disorder  · pheochromocytoma  · suicidal thoughts  · an unusual or allergic reaction to atomoxetine, other medicines, foods, dyes, or preservatives  · pregnant or trying to get pregnant  · breast-feeding  How should I use this medicine?  Take this medicine by mouth with a glass of water. Follow the directions on the prescription label. You can take it with or without food. If it upsets your stomach, take it with food. If you have difficulty sleeping and you take more than 1 dose per day, take your last dose before 6 PM. Take your medicine at regular intervals. Do not take it more often than directed. Do not stop taking except on your doctor's advice.  A special MedGuide will be given to you by the pharmacist with each prescription and refill. Be sure to read this information carefully each time.  Talk to your pediatrician regarding the use of this medicine in children. While this drug may be prescribed for children as young as 6 years for selected conditions, precautions do apply.  Overdosage: If you think you have taken too much of this medicine contact a poison control center or emergency room at once.  NOTE: This medicine is only for you. Do not share this medicine with  others.  What if I miss a dose?  If you miss a dose, take it as soon as you can. If it is almost time for your next dose, take only that dose. Do not take double or extra doses.  What may interact with this medicine?  Do not take this medicine with any of the following medications:  · cisapride  · dronedarone  · MAOIs like Carbex, Eldepryl, Marplan, Nardil, and Parnate  · pimozide  · reboxetine  · thioridazine  This medicine may also interact with the following medications:  · certain medicines for blood pressure, heart disease, irregular heart beat  · certain medicines for depression, anxiety, or psychotic disturbances  · certain medicines for lung disease like albuterol  · cold or allergy medicines  · dofetilide  · fluoxetine  · medicines that increase blood pressure like dopamine, dobutamine, or ephedrine  · other medicines that prolong the QT interval (cause an abnormal heart rhythm)  · paroxetine  · quinidine  · stimulant medicines for attention disorders, weight loss, or to stay awake  · ziprasidone  This list may not describe all possible interactions. Give your health care provider a list of all the medicines, herbs, non-prescription drugs, or dietary supplements you use. Also tell them if you smoke, drink alcohol, or use illegal drugs. Some items may interact with your medicine.  What should I watch for while using this medicine?  It may take a week or more for this medicine to take effect. This is why it is very important to continue taking the medicine and not miss any doses. If you have been taking this medicine regularly for some time, do not suddenly stop taking it. Ask your doctor or health care professional for advice.  Rarely, this medicine may increase thoughts of suicide or suicide attempts in children and teenagers. Call your child's health care professional right away if your child or teenager has new or increased thoughts of suicide or has changes in mood or behavior like becoming irritable or  anxious. Regularly monitor your child for these behavioral changes.  For males, contact you doctor or health care professional right away if you have an erection that lasts longer than 4 hours or if it becomes painful. This may be a sign of serious problem and must be treated right away to prevent permanent damage.  You may get drowsy or dizzy. Do not drive, use machinery, or do anything that needs mental alertness until you know how this medicine affects you. Do not stand or sit up quickly, especially if you are an older patient. This reduces the risk of dizzy or fainting spells. Alcohol can make you more drowsy and dizzy. Avoid alcoholic drinks.  Do not treat yourself for coughs, colds or allergies without asking your doctor or health care professional for advice. Some ingredients can increase possible side effects.  Your mouth may get dry. Chewing sugarless gum or sucking hard candy, and drinking plenty of water will help.  What side effects may I notice from receiving this medicine?  Side effects that you should report to your doctor or health care professional as soon as possible:  · allergic reactions like skin rash, itching or hives, swelling of the face, lips, or tongue  · breathing problems  · chest pain  · dark urine  · fast, irregular heartbeat  · general ill feeling or flu-like symptoms  · high blood pressure  · males: prolonged or painful erection  · stomach pain or tenderness  · trouble passing urine or change in the amount of urine  · vomiting  · weight loss  · yellowing of the eyes or skin  Side effects that usually do not require medical attention (report to your doctor or health care professional if they continue or are bothersome):  · change in sex drive or performance  · constipation or diarrhea  · headache  · loss of appetite  · menstrual period irregularities  · nausea  · stomach upset  This list may not describe all possible side effects. Call your doctor for medical advice about side effects.  You may report side effects to FDA at 2-725-KKY-5911.  Where should I keep my medicine?  Keep out of the reach of children.  Store at room temperature between 15 and 30 degrees C (59 and 86 degrees F). Throw away any unused medication after the expiration date.  NOTE: This sheet is a summary. It may not cover all possible information. If you have questions about this medicine, talk to your doctor, pharmacist, or health care provider.  © 2020 [employer] (2019-11-20 15:02:07)

## 2020-11-20 ENCOUNTER — TELEPHONE (OUTPATIENT)
Dept: FAMILY MEDICINE CLINIC | Facility: CLINIC | Age: 25
End: 2020-11-20

## 2020-11-20 NOTE — TELEPHONE ENCOUNTER
PATIENT CALLED IN REQUESTING TO INCREASE DOSAGE OF atomoxetine (Strattera) 40 MG capsule. STATES THAT CURRENT DOSAGE IS NOT WORKING.     PLEASE CONTACT AND ADVISE.    [id number] - [facility] [address] [facility] [address] [facility] AT [facility] OF  [address] - [phone]  - [phone]  [phone]

## 2020-11-23 RX ORDER — ATOMOXETINE 80 MG/1
80 CAPSULE ORAL DAILY
Qty: 30 CAPSULE | Refills: 0 | Status: SHIPPED | OUTPATIENT
Start: 2020-11-23 | End: 2020-12-21 | Stop reason: SDUPTHER

## 2020-11-23 NOTE — TELEPHONE ENCOUNTER
Please let patient know that we can increase to 80 mg daily.  New prescription sent to the pharmacy.  Patient can take 2 tablets of what she has left at the same time in the morning, but with new prescription she will only need 1 capsule daily.  She should give this medication time to work.  We can discuss how she is doing at scheduled appointment on 12/04/2020.  ThanksSUZY

## 2020-12-09 ENCOUNTER — OFFICE VISIT (OUTPATIENT)
Dept: FAMILY MEDICINE CLINIC | Facility: CLINIC | Age: 25
End: 2020-12-09

## 2020-12-09 VITALS
BODY MASS INDEX: 36.68 KG/M2 | WEIGHT: 207 LBS | OXYGEN SATURATION: 98 % | DIASTOLIC BLOOD PRESSURE: 72 MMHG | HEIGHT: 63 IN | SYSTOLIC BLOOD PRESSURE: 120 MMHG | HEART RATE: 85 BPM

## 2020-12-09 DIAGNOSIS — F90.2 ATTENTION DEFICIT HYPERACTIVITY DISORDER (ADHD), COMBINED TYPE: Primary | ICD-10-CM

## 2020-12-09 DIAGNOSIS — R00.2 PALPITATIONS: ICD-10-CM

## 2020-12-09 PROCEDURE — 99213 OFFICE O/P EST LOW 20 MIN: CPT | Performed by: FAMILY MEDICINE

## 2020-12-09 PROCEDURE — 93005 ELECTROCARDIOGRAM TRACING: CPT | Performed by: FAMILY MEDICINE

## 2020-12-09 PROCEDURE — 93010 ELECTROCARDIOGRAM REPORT: CPT | Performed by: INTERNAL MEDICINE

## 2020-12-09 NOTE — PROGRESS NOTES
"Follow Up Office Visit          Tanja Brice is a 25 y.o. female that presents today for   Chief Complaint   Patient presents with   • Follow-up     ADHD med follow-up       HPI    Patient presents today for ADHD follow up.  She is taking Strattera 80 mg daily, and doing well on this increased dose of medication.  Patient has been having some elevated blood pressure intermittently.  When dose was first increased, she noticed headache and some sleeping difficulties.  This has improved.  Patient has been having some palpitations intermittently.  This is not anything new.  She has had EKG, Holter monitor and echocardiogram in the past for cardiac evaluation.  No associated shortness of breath, chest pain or diaphoresis.    The following portions of the patient's history were reviewed and updated as appropriate: allergies, current medications, past medical history and problem list.    Review of Systems   Constitutional: Negative for chills and fever.   HENT: Negative for congestion and sinus pain.    Respiratory: Negative for cough and shortness of breath.    Cardiovascular: Positive for palpitations. Negative for chest pain and leg swelling.   Gastrointestinal: Negative for abdominal pain, diarrhea, nausea and vomiting.   Musculoskeletal: Negative for back pain, gait problem and joint swelling.   Skin: Negative for rash.   Neurological: Negative for weakness and headaches.   Psychiatric/Behavioral: Negative for dysphoric mood and sleep disturbance.             Vitals:    12/09/20 1306   BP: 120/72   Pulse: 85   SpO2: 98%   Weight: 93.9 kg (207 lb)   Height: 160 cm (63\")     Body mass index is 36.67 kg/m².    Physical Exam  Vitals signs reviewed.   Constitutional:       General: She is not in acute distress.     Appearance: She is well-developed.   HENT:      Head: Normocephalic and atraumatic.   Neck:      Musculoskeletal: Neck supple.   Cardiovascular:      Rate and Rhythm: Normal rate and regular rhythm.     "  Heart sounds: Normal heart sounds. No murmur.   Pulmonary:      Effort: Pulmonary effort is normal. No respiratory distress.      Breath sounds: Normal breath sounds. No wheezing or rales.   Abdominal:      Palpations: Abdomen is soft.      Tenderness: There is no abdominal tenderness.   Skin:     General: Skin is warm and dry.      Findings: No rash.   Neurological:      Mental Status: She is alert and oriented to person, place, and time.         Assessment/Plan   Diagnoses and all orders for this visit:    1. Attention deficit hyperactivity disorder (ADHD), combined type (Primary)    2. Palpitations  -     ECG 12 Lead      Patient's ADHD symptoms controlled on Strattera 80 mg daily  No refill needed today  She has noticed increased palpitations lately  Palpitations is not a new problem for this patient  EKG done in the office showed normal sinus rhythm  Will be reviewed by cardiology  Patient will monitor BP, heart rate and frequency of palpitations  She will call with any abnormalities prior to next appointment  If palpitations are persistent, may consider decreasing Strattera dose and/or cardiology referral for additional evaluation  Patient agreeable with this plan      Return in about 3 months (around 3/9/2021) for Recheck.           This document has been electronically signed by Brooke Denise MD on December 9, 2020 13:14 CST

## 2020-12-11 LAB
QT INTERVAL: 392 MS
QTC INTERVAL: 449 MS

## 2020-12-21 RX ORDER — ATOMOXETINE 80 MG/1
80 CAPSULE ORAL DAILY
Qty: 30 CAPSULE | Refills: 0 | Status: SHIPPED | OUTPATIENT
Start: 2020-12-21 | End: 2021-01-18 | Stop reason: SDUPTHER

## 2021-01-13 ENCOUNTER — TELEPHONE (OUTPATIENT)
Dept: FAMILY MEDICINE CLINIC | Facility: CLINIC | Age: 26
End: 2021-01-13

## 2021-01-13 NOTE — TELEPHONE ENCOUNTER
PATIENT CALLING IN STATES THAT SKIN ISSUES ARE STILL PRESENT AND WOULD LIKE TO RECIEVE A CALLBACK TO DISCUSS NEXT STEPS.     PLEASE CONTACT AND ADVISE.

## 2021-01-15 RX ORDER — TRIAMCINOLONE ACETONIDE 0.25 MG/G
CREAM TOPICAL 2 TIMES DAILY
Qty: 80 G | Refills: 0 | Status: SHIPPED | OUTPATIENT
Start: 2021-01-15 | End: 2021-06-28

## 2021-01-15 NOTE — TELEPHONE ENCOUNTER
Spoke with patient.  She notes that the small lesions that she had on her legs and arms seem to be spreading a little.  They are now itchy.  She has tried coconut oil and apple cider vinegar, which do not help the itching much.  She has not noticed much spread over the last few days.  Advised that we try a corticosteroid cream for 2 weeks.  If worsening or not improved, patient will come in for reevaluation of the skin lesions.  Thanks, Gabriella

## 2021-01-18 RX ORDER — ATOMOXETINE 80 MG/1
80 CAPSULE ORAL DAILY
Qty: 30 CAPSULE | Refills: 0 | Status: SHIPPED | OUTPATIENT
Start: 2021-01-18 | End: 2021-03-04 | Stop reason: SDUPTHER

## 2021-03-04 RX ORDER — ATOMOXETINE 80 MG/1
80 CAPSULE ORAL DAILY
Qty: 30 CAPSULE | Refills: 0 | Status: SHIPPED | OUTPATIENT
Start: 2021-03-04 | End: 2021-03-27

## 2021-03-04 NOTE — TELEPHONE ENCOUNTER
Has appt on Tues, March 9th. Requesting refill be sent to different pharmacy.    Is now using [facility].    atomoxetine (Strattera) 80 MG capsule

## 2021-03-10 ENCOUNTER — OFFICE VISIT (OUTPATIENT)
Dept: FAMILY MEDICINE CLINIC | Facility: CLINIC | Age: 26
End: 2021-03-10

## 2021-03-10 VITALS
WEIGHT: 204.8 LBS | HEIGHT: 63 IN | OXYGEN SATURATION: 99 % | HEART RATE: 97 BPM | BODY MASS INDEX: 36.29 KG/M2 | DIASTOLIC BLOOD PRESSURE: 82 MMHG | SYSTOLIC BLOOD PRESSURE: 112 MMHG

## 2021-03-10 DIAGNOSIS — B35.4 TINEA CORPORIS: ICD-10-CM

## 2021-03-10 DIAGNOSIS — F90.2 ATTENTION DEFICIT HYPERACTIVITY DISORDER (ADHD), COMBINED TYPE: Primary | ICD-10-CM

## 2021-03-10 PROCEDURE — 99213 OFFICE O/P EST LOW 20 MIN: CPT | Performed by: FAMILY MEDICINE

## 2021-03-10 RX ORDER — DEXTROAMPHETAMINE SACCHARATE, AMPHETAMINE ASPARTATE MONOHYDRATE, DEXTROAMPHETAMINE SULFATE AND AMPHETAMINE SULFATE 2.5; 2.5; 2.5; 2.5 MG/1; MG/1; MG/1; MG/1
10 CAPSULE, EXTENDED RELEASE ORAL EVERY MORNING
Qty: 30 CAPSULE | Refills: 0 | Status: SHIPPED | OUTPATIENT
Start: 2021-03-10 | End: 2021-04-16

## 2021-03-10 RX ORDER — CLOTRIMAZOLE 1 %
CREAM (GRAM) TOPICAL 2 TIMES DAILY
Qty: 45 G | Refills: 0 | Status: SHIPPED | OUTPATIENT
Start: 2021-03-10 | End: 2021-03-20

## 2021-03-10 NOTE — PROGRESS NOTES
"Chief Complaint  ADHD (3 month recheck)    Subjective          Tanja Brice presents to [facility]  History of Present Illness    Patient presents today for follow up ADHD.  She has been taking Strattera.  Dose increased to 80 mg daily .  She notes that this dose has helped some with her focus and she has improvement in her ability to complete tasks.  However, also having some adverse effects.  Feels like the medication \"mellows her out\" too much and has been causing her to intermittently feel chest tightness.  She would like to try a different medication.       Objective   Vital Signs:   /82   Pulse 97   Ht 160 cm (63\")   Wt 92.9 kg (204 lb 12.8 oz)   SpO2 99%   BMI 36.28 kg/m²     Physical Exam  Vitals reviewed.   Constitutional:       General: She is not in acute distress.     Appearance: She is well-developed.   HENT:      Head: Normocephalic and atraumatic.   Cardiovascular:      Rate and Rhythm: Normal rate and regular rhythm.      Heart sounds: Normal heart sounds. No murmur heard.     Pulmonary:      Effort: Pulmonary effort is normal. No respiratory distress.      Breath sounds: Normal breath sounds. No wheezing or rales.   Abdominal:      Palpations: Abdomen is soft.      Tenderness: There is no abdominal tenderness.   Musculoskeletal:      Cervical back: Neck supple.   Skin:     General: Skin is warm and dry.      Findings: Lesion (circular lesion with clear borders consistent with tinea corporis) present.   Neurological:      Mental Status: She is alert and oriented to person, place, and time.        Result Review :   The following data was reviewed by: Brooke Denise MD on 03/10/2021:  Common labs    Common Labsle 8/15/20 8/15/20    0333 0333   Glucose  129 (A)   BUN  7   Creatinine  0.72   eGFR Non African Am  97   Sodium  137   Potassium  4.0   Chloride  100   Calcium  9.3   Albumin  4.50   Total Bilirubin  0.7   Alkaline Phosphatase  71   AST " (SGOT)  25   ALT (SGPT)  40 (A)   WBC 14.42 (A)    Hemoglobin 12.9    Hematocrit 39.3    Platelets 337    (A) Abnormal value       Comments are available for some flowsheets but are not being displayed.                     Assessment and Plan    Diagnoses and all orders for this visit:    1. Attention deficit hyperactivity disorder (ADHD), combined type (Primary)  -     amphetamine-dextroamphetamine XR (Adderall XR) 10 MG 24 hr capsule; Take 1 capsule by mouth Every Morning  Dispense: 30 capsule; Refill: 0    2. Tinea corporis  -     clotrimazole (Clotrimazole AF) 1 % cream; Apply  topically to the appropriate area as directed 2 (Two) Times a Day for 10 days.  Dispense: 45 g; Refill: 0      Patient presents for ADHD follow up.  Having adverse effects from increased dose of Strattera.  Will discontinue Strattera.  Start Adderall XR 10 mg daily for treatment of ADHD.  Monitor for adverse effects.  Controlled substance agreement signed.  Skin lesion consistent with tinea corporis.  Treat with clotrimazole for up to 10 days.  Call if not improved/resolved.           Follow Up   Return in about 4 weeks (around 4/7/2021) for Recheck.  Patient was given instructions and counseling regarding her condition or for health maintenance advice. Please see specific information pulled into the AVS if appropriate.         This document has been electronically signed by Brooke Denise MD

## 2021-04-15 ENCOUNTER — TELEPHONE (OUTPATIENT)
Dept: FAMILY MEDICINE CLINIC | Facility: CLINIC | Age: 26
End: 2021-04-15

## 2021-04-15 DIAGNOSIS — F90.2 ATTENTION DEFICIT HYPERACTIVITY DISORDER (ADHD), COMBINED TYPE: Primary | ICD-10-CM

## 2021-04-15 NOTE — TELEPHONE ENCOUNTER
Patient wanting a call back, stated that Dr. Denise was supposed to call and check on her to see how she was doing on her medication, but she hasn't received a phone call from Dr. Denise.

## 2021-04-15 NOTE — TELEPHONE ENCOUNTER
I asked that patient let me know how she is doing.  Please find out if the Adderall XR is helping or if she believes the dose needs to be increased.  Brandon Denise

## 2021-04-16 RX ORDER — DEXTROAMPHETAMINE SACCHARATE, AMPHETAMINE ASPARTATE MONOHYDRATE, DEXTROAMPHETAMINE SULFATE AND AMPHETAMINE SULFATE 3.75; 3.75; 3.75; 3.75 MG/1; MG/1; MG/1; MG/1
15 CAPSULE, EXTENDED RELEASE ORAL EVERY MORNING
Qty: 30 CAPSULE | Refills: 0 | Status: SHIPPED | OUTPATIENT
Start: 2021-04-16 | End: 2021-05-25

## 2021-04-16 NOTE — TELEPHONE ENCOUNTER
Spoke to patient-she said she has noticed some difference but believes it does need to be increased.

## 2021-04-19 NOTE — TELEPHONE ENCOUNTER
Called and let patient know dosage increase and that it was sent to [place] pharmacy. Understanding was verbalized.

## 2021-05-25 DIAGNOSIS — F90.2 ATTENTION DEFICIT HYPERACTIVITY DISORDER (ADHD), COMBINED TYPE: ICD-10-CM

## 2021-05-25 RX ORDER — DEXTROAMPHETAMINE SACCHARATE, AMPHETAMINE ASPARTATE MONOHYDRATE, DEXTROAMPHETAMINE SULFATE AND AMPHETAMINE SULFATE 3.75; 3.75; 3.75; 3.75 MG/1; MG/1; MG/1; MG/1
15 CAPSULE, EXTENDED RELEASE ORAL EVERY MORNING
Qty: 30 CAPSULE | Refills: 0 | Status: SHIPPED | OUTPATIENT
Start: 2021-05-25 | End: 2021-06-28 | Stop reason: SDUPTHER

## 2021-06-28 ENCOUNTER — LAB (OUTPATIENT)
Dept: LAB | Facility: HOSPITAL | Age: 26
End: 2021-06-28

## 2021-06-28 ENCOUNTER — OFFICE VISIT (OUTPATIENT)
Dept: FAMILY MEDICINE CLINIC | Facility: CLINIC | Age: 26
End: 2021-06-28

## 2021-06-28 VITALS
HEIGHT: 63 IN | BODY MASS INDEX: 34.29 KG/M2 | HEART RATE: 88 BPM | DIASTOLIC BLOOD PRESSURE: 76 MMHG | WEIGHT: 193.5 LBS | SYSTOLIC BLOOD PRESSURE: 118 MMHG | OXYGEN SATURATION: 98 %

## 2021-06-28 DIAGNOSIS — F90.2 ATTENTION DEFICIT HYPERACTIVITY DISORDER (ADHD), COMBINED TYPE: ICD-10-CM

## 2021-06-28 DIAGNOSIS — R73.09 ELEVATED GLUCOSE LEVEL: ICD-10-CM

## 2021-06-28 DIAGNOSIS — F90.2 ATTENTION DEFICIT HYPERACTIVITY DISORDER (ADHD), COMBINED TYPE: Primary | ICD-10-CM

## 2021-06-28 PROCEDURE — 80053 COMPREHEN METABOLIC PANEL: CPT

## 2021-06-28 PROCEDURE — 36415 COLL VENOUS BLD VENIPUNCTURE: CPT

## 2021-06-28 PROCEDURE — 85025 COMPLETE CBC W/AUTO DIFF WBC: CPT

## 2021-06-28 PROCEDURE — 83036 HEMOGLOBIN GLYCOSYLATED A1C: CPT

## 2021-06-28 PROCEDURE — 99213 OFFICE O/P EST LOW 20 MIN: CPT | Performed by: FAMILY MEDICINE

## 2021-06-28 RX ORDER — DEXTROAMPHETAMINE SACCHARATE, AMPHETAMINE ASPARTATE MONOHYDRATE, DEXTROAMPHETAMINE SULFATE AND AMPHETAMINE SULFATE 5; 5; 5; 5 MG/1; MG/1; MG/1; MG/1
20 CAPSULE, EXTENDED RELEASE ORAL EVERY MORNING
Qty: 30 CAPSULE | Refills: 0 | Status: SHIPPED | OUTPATIENT
Start: 2021-06-28 | End: 2021-07-26 | Stop reason: SDUPTHER

## 2021-06-28 NOTE — PROGRESS NOTES
"Chief Complaint  Follow-up and ADHD    Subjective          Tanja Brice presents to [facility]  History of Present Illness    Patient presents today for ADHD follow-up.  Currently taking Adderall XR 15 mg daily.  Has noticed significant improvement since starting the medication.  Can finish task and can finish thought processes.  Patient feels like the medication needs to be increased just a little bit.  She does take a break on the weekends to help with appetite.  No trouble with sleep.  If anything, patient says she sleeps harder because she is staying busy during the day.  No adverse effects from this medication.    Objective   Vital Signs:   /76   Pulse 88   Ht 160 cm (63\")   Wt 87.8 kg (193 lb 8 oz)   SpO2 98%   BMI 34.28 kg/m²     Physical Exam  Vitals reviewed.   Constitutional:       General: She is not in acute distress.     Appearance: She is well-developed.   HENT:      Head: Normocephalic and atraumatic.   Cardiovascular:      Rate and Rhythm: Normal rate and regular rhythm.      Heart sounds: Normal heart sounds. No murmur heard.     Pulmonary:      Effort: Pulmonary effort is normal. No respiratory distress.      Breath sounds: Normal breath sounds. No wheezing or rales.   Abdominal:      Palpations: Abdomen is soft.      Tenderness: There is no abdominal tenderness.   Skin:     General: Skin is warm and dry.   Neurological:      Mental Status: She is alert and oriented to person, place, and time.        Result Review :   The following data was reviewed by: Brooke Denise MD on 06/28/2021:  Common labs    Common Labsle 8/13/20 8/15/20 8/15/20     0333 0333   Glucose 96  129 (A)   BUN 8  7   Creatinine 0.70  0.72   eGFR Non African Am 100  97   Sodium 140  137   Potassium 4.3  4.0   Chloride 104  100   Calcium 8.9  9.3   Albumin 4.10  4.50   Total Bilirubin 0.3  0.7   Alkaline Phosphatase 64  71   AST (SGOT) 26  25   ALT (SGPT) 26  40 (A)   WBC  " 14.42 (A)    Hemoglobin  12.9    Hematocrit  39.3    Platelets  337    (A) Abnormal value       Comments are available for some flowsheets but are not being displayed.                     Assessment and Plan    Diagnoses and all orders for this visit:    1. Attention deficit hyperactivity disorder (ADHD), combined type (Primary)  -     Comprehensive Metabolic Panel; Future  -     CBC & Differential; Future  -     Hemoglobin A1c; Future  -     amphetamine-dextroamphetamine XR (ADDERALL XR) 20 MG 24 hr capsule; Take 1 capsule by mouth Every Morning  Dispense: 30 capsule; Refill: 0    2. Elevated glucose level  -     Comprehensive Metabolic Panel; Future  -     Hemoglobin A1c; Future      Patient seen today for follow-up.  ADHD symptoms improved.  Will increase Adderall XR to 20 mg daily.  Patient will call with any problems with this new medication.  Will check CMP, CBC and hemoglobin A1c today, as patient had elevated glucose level on last check.  Will call patient with results once available.  Follow-up in 3 months.  Patient is due for Pap smear, so will complete with next visit.          Follow Up   Return in about 3 months (around 9/28/2021) for Annual physical with pap smear, Recheck.  Patient was given instructions and counseling regarding her condition or for health maintenance advice. Please see specific information pulled into the AVS if appropriate.         This document has been electronically signed by Brooke Denise MD

## 2021-06-29 ENCOUNTER — TELEPHONE (OUTPATIENT)
Dept: FAMILY MEDICINE CLINIC | Facility: CLINIC | Age: 26
End: 2021-06-29

## 2021-06-29 LAB
ALBUMIN SERPL-MCNC: 4.4 G/DL (ref 3.5–5.2)
ALBUMIN/GLOB SERPL: 1.8 G/DL
ALP SERPL-CCNC: 66 U/L (ref 39–117)
ALT SERPL W P-5'-P-CCNC: 26 U/L (ref 1–33)
ANION GAP SERPL CALCULATED.3IONS-SCNC: 8.6 MMOL/L (ref 5–15)
AST SERPL-CCNC: 20 U/L (ref 1–32)
BASOPHILS # BLD AUTO: 0.03 10*3/MM3 (ref 0–0.2)
BASOPHILS NFR BLD AUTO: 0.4 % (ref 0–1.5)
BILIRUB SERPL-MCNC: 0.3 MG/DL (ref 0–1.2)
BUN SERPL-MCNC: 7 MG/DL (ref 6–20)
BUN/CREAT SERPL: 8.4 (ref 7–25)
CALCIUM SPEC-SCNC: 9 MG/DL (ref 8.6–10.5)
CHLORIDE SERPL-SCNC: 106 MMOL/L (ref 98–107)
CO2 SERPL-SCNC: 25.4 MMOL/L (ref 22–29)
CREAT SERPL-MCNC: 0.83 MG/DL (ref 0.57–1)
DEPRECATED RDW RBC AUTO: 39.5 FL (ref 37–54)
EOSINOPHIL # BLD AUTO: 0 10*3/MM3 (ref 0–0.4)
EOSINOPHIL NFR BLD AUTO: 0 % (ref 0.3–6.2)
ERYTHROCYTE [DISTWIDTH] IN BLOOD BY AUTOMATED COUNT: 13.1 % (ref 12.3–15.4)
GFR SERPL CREATININE-BSD FRML MDRD: 82 ML/MIN/1.73
GLOBULIN UR ELPH-MCNC: 2.5 GM/DL
GLUCOSE SERPL-MCNC: 80 MG/DL (ref 65–99)
HBA1C MFR BLD: 5.2 % (ref 4.8–5.6)
HCT VFR BLD AUTO: 39.4 % (ref 34–46.6)
HGB BLD-MCNC: 13.2 G/DL (ref 12–15.9)
IMM GRANULOCYTES # BLD AUTO: 0.04 10*3/MM3 (ref 0–0.05)
IMM GRANULOCYTES NFR BLD AUTO: 0.6 % (ref 0–0.5)
LYMPHOCYTES # BLD AUTO: 2.05 10*3/MM3 (ref 0.7–3.1)
LYMPHOCYTES NFR BLD AUTO: 28.8 % (ref 19.6–45.3)
MCH RBC QN AUTO: 27.9 PG (ref 26.6–33)
MCHC RBC AUTO-ENTMCNC: 33.5 G/DL (ref 31.5–35.7)
MCV RBC AUTO: 83.3 FL (ref 79–97)
MONOCYTES # BLD AUTO: 0.48 10*3/MM3 (ref 0.1–0.9)
MONOCYTES NFR BLD AUTO: 6.7 % (ref 5–12)
NEUTROPHILS NFR BLD AUTO: 4.53 10*3/MM3 (ref 1.7–7)
NEUTROPHILS NFR BLD AUTO: 63.5 % (ref 42.7–76)
NRBC BLD AUTO-RTO: 0 /100 WBC (ref 0–0.2)
PLATELET # BLD AUTO: 308 10*3/MM3 (ref 140–450)
PMV BLD AUTO: 10.7 FL (ref 6–12)
POTASSIUM SERPL-SCNC: 5.1 MMOL/L (ref 3.5–5.2)
PROT SERPL-MCNC: 6.9 G/DL (ref 6–8.5)
RBC # BLD AUTO: 4.73 10*6/MM3 (ref 3.77–5.28)
SODIUM SERPL-SCNC: 140 MMOL/L (ref 136–145)
WBC # BLD AUTO: 7.13 10*3/MM3 (ref 3.4–10.8)

## 2021-06-29 NOTE — TELEPHONE ENCOUNTER
Per Dr. Denise, Ms. Brice has been called with recent lab results and recommendations  Continue current medications and follow-up as planned or sooner if any problems.

## 2021-07-26 DIAGNOSIS — F90.2 ATTENTION DEFICIT HYPERACTIVITY DISORDER (ADHD), COMBINED TYPE: ICD-10-CM

## 2021-07-26 RX ORDER — DEXTROAMPHETAMINE SACCHARATE, AMPHETAMINE ASPARTATE MONOHYDRATE, DEXTROAMPHETAMINE SULFATE AND AMPHETAMINE SULFATE 5; 5; 5; 5 MG/1; MG/1; MG/1; MG/1
20 CAPSULE, EXTENDED RELEASE ORAL EVERY MORNING
Qty: 30 CAPSULE | Refills: 0 | Status: SHIPPED | OUTPATIENT
Start: 2021-07-26 | End: 2021-09-07

## 2021-08-10 ENCOUNTER — TELEPHONE (OUTPATIENT)
Dept: FAMILY MEDICINE CLINIC | Facility: CLINIC | Age: 26
End: 2021-08-10

## 2021-08-10 DIAGNOSIS — Z83.2 FAMILY HISTORY OF PROTHROMBIN GENE MUTATION: ICD-10-CM

## 2021-08-10 DIAGNOSIS — Z83.2 FAMILY HISTORY OF FACTOR V LEIDEN MUTATION: Primary | ICD-10-CM

## 2021-08-10 NOTE — TELEPHONE ENCOUNTER
Patients father had some labs that indicated he had two genetic disorders factor Veliden, prothrombin gene mutations they suggest she be checked also for the gene mutations as well. Patient had a stroke 3 weeks ago and they were trying to figure out the cause. Doctor said these genetic conditions could be contributing factors. Patient call back is [phone]

## 2021-08-16 NOTE — TELEPHONE ENCOUNTER
Lab order for factor V and prothrombin placed.  Patient can come to lab to have this done.  Please make sure she has a follow up appointment and request records from wherever she was seen for stroke.  ThanksSUZY

## 2021-08-17 ENCOUNTER — LAB (OUTPATIENT)
Dept: LAB | Facility: HOSPITAL | Age: 26
End: 2021-08-17

## 2021-08-17 DIAGNOSIS — Z83.2 FAMILY HISTORY OF PROTHROMBIN GENE MUTATION: ICD-10-CM

## 2021-08-17 DIAGNOSIS — Z83.2 FAMILY HISTORY OF FACTOR V LEIDEN MUTATION: ICD-10-CM

## 2021-08-17 PROCEDURE — 36415 COLL VENOUS BLD VENIPUNCTURE: CPT

## 2021-08-17 PROCEDURE — 81241 F5 GENE: CPT

## 2021-08-17 PROCEDURE — 81240 F2 GENE: CPT

## 2021-08-18 LAB
F5 GENE MUT ANL BLD/T: ABNORMAL
FACTOR II, DNA ANALYSIS: NORMAL

## 2021-08-27 ENCOUNTER — TELEPHONE (OUTPATIENT)
Dept: FAMILY MEDICINE CLINIC | Facility: CLINIC | Age: 26
End: 2021-08-27

## 2021-08-27 NOTE — TELEPHONE ENCOUNTER
Please let patient know the factor II activity ok.  Factor 5 leiden heterozygous. Can discuss further at next appointment.  Thanks, SUZY Denise

## 2021-09-07 DIAGNOSIS — F90.2 ATTENTION DEFICIT HYPERACTIVITY DISORDER (ADHD), COMBINED TYPE: ICD-10-CM

## 2021-09-07 RX ORDER — DEXTROAMPHETAMINE SACCHARATE, AMPHETAMINE ASPARTATE MONOHYDRATE, DEXTROAMPHETAMINE SULFATE AND AMPHETAMINE SULFATE 5; 5; 5; 5 MG/1; MG/1; MG/1; MG/1
20 CAPSULE, EXTENDED RELEASE ORAL EVERY MORNING
Qty: 30 CAPSULE | Refills: 0 | Status: SHIPPED | OUTPATIENT
Start: 2021-09-07 | End: 2021-09-29 | Stop reason: SDUPTHER

## 2021-09-25 ENCOUNTER — APPOINTMENT (OUTPATIENT)
Dept: CT IMAGING | Facility: HOSPITAL | Age: 26
End: 2021-09-25

## 2021-09-25 ENCOUNTER — HOSPITAL ENCOUNTER (EMERGENCY)
Facility: HOSPITAL | Age: 26
Discharge: HOME | End: 2021-09-25
Attending: FAMILY MEDICINE | Admitting: FAMILY MEDICINE

## 2021-09-25 VITALS
TEMPERATURE: 97.1 F | RESPIRATION RATE: 20 BRPM | HEART RATE: 74 BPM | WEIGHT: 187 LBS | OXYGEN SATURATION: 98 % | DIASTOLIC BLOOD PRESSURE: 86 MMHG | SYSTOLIC BLOOD PRESSURE: 140 MMHG | HEIGHT: 63 IN | BODY MASS INDEX: 33.13 KG/M2

## 2021-09-25 DIAGNOSIS — N20.0 KIDNEY STONE: Primary | ICD-10-CM

## 2021-09-25 LAB
ALBUMIN SERPL-MCNC: 4.2 G/DL (ref 3.5–5.2)
ALBUMIN/GLOB SERPL: 1.6 G/DL
ALP SERPL-CCNC: 62 U/L (ref 39–117)
ALT SERPL W P-5'-P-CCNC: 18 U/L (ref 1–33)
ANION GAP SERPL CALCULATED.3IONS-SCNC: 9 MMOL/L (ref 5–15)
AST SERPL-CCNC: 15 U/L (ref 1–32)
B-HCG UR QL: NEGATIVE
BASOPHILS # BLD AUTO: 0.05 10*3/MM3 (ref 0–0.2)
BASOPHILS NFR BLD AUTO: 0.6 % (ref 0–1.5)
BILIRUB SERPL-MCNC: 0.2 MG/DL (ref 0–1.2)
BILIRUB UR QL STRIP: NEGATIVE
BUN SERPL-MCNC: 7 MG/DL (ref 6–20)
BUN/CREAT SERPL: 9.1 (ref 7–25)
CALCIUM SPEC-SCNC: 8.7 MG/DL (ref 8.6–10.5)
CHLORIDE SERPL-SCNC: 106 MMOL/L (ref 98–107)
CLARITY UR: ABNORMAL
CO2 SERPL-SCNC: 24 MMOL/L (ref 22–29)
COLOR UR: YELLOW
CREAT SERPL-MCNC: 0.77 MG/DL (ref 0.57–1)
DEPRECATED RDW RBC AUTO: 38.6 FL (ref 37–54)
EOSINOPHIL # BLD AUTO: 0 10*3/MM3 (ref 0–0.4)
EOSINOPHIL NFR BLD AUTO: 0 % (ref 0.3–6.2)
ERYTHROCYTE [DISTWIDTH] IN BLOOD BY AUTOMATED COUNT: 12.6 % (ref 12.3–15.4)
GFR SERPL CREATININE-BSD FRML MDRD: 89 ML/MIN/1.73
GLOBULIN UR ELPH-MCNC: 2.6 GM/DL
GLUCOSE SERPL-MCNC: 117 MG/DL (ref 65–99)
GLUCOSE UR STRIP-MCNC: NEGATIVE MG/DL
HCT VFR BLD AUTO: 36.2 % (ref 34–46.6)
HGB BLD-MCNC: 12.3 G/DL (ref 12–15.9)
HGB UR QL STRIP.AUTO: NEGATIVE
HOLD SPECIMEN: NORMAL
HOLD SPECIMEN: NORMAL
IMM GRANULOCYTES # BLD AUTO: 0.02 10*3/MM3 (ref 0–0.05)
IMM GRANULOCYTES NFR BLD AUTO: 0.3 % (ref 0–0.5)
KETONES UR QL STRIP: NEGATIVE
LEUKOCYTE ESTERASE UR QL STRIP.AUTO: NEGATIVE
LIPASE SERPL-CCNC: 23 U/L (ref 13–60)
LYMPHOCYTES # BLD AUTO: 2.83 10*3/MM3 (ref 0.7–3.1)
LYMPHOCYTES NFR BLD AUTO: 35.4 % (ref 19.6–45.3)
MCH RBC QN AUTO: 28.5 PG (ref 26.6–33)
MCHC RBC AUTO-ENTMCNC: 34 G/DL (ref 31.5–35.7)
MCV RBC AUTO: 84 FL (ref 79–97)
MONOCYTES # BLD AUTO: 0.53 10*3/MM3 (ref 0.1–0.9)
MONOCYTES NFR BLD AUTO: 6.6 % (ref 5–12)
NEUTROPHILS NFR BLD AUTO: 4.56 10*3/MM3 (ref 1.7–7)
NEUTROPHILS NFR BLD AUTO: 57.1 % (ref 42.7–76)
NITRITE UR QL STRIP: NEGATIVE
NRBC BLD AUTO-RTO: 0 /100 WBC (ref 0–0.2)
PH UR STRIP.AUTO: 7 [PH] (ref 5–9)
PLATELET # BLD AUTO: 295 10*3/MM3 (ref 140–450)
PMV BLD AUTO: 9.5 FL (ref 6–12)
POTASSIUM SERPL-SCNC: 3.6 MMOL/L (ref 3.5–5.2)
PROT SERPL-MCNC: 6.8 G/DL (ref 6–8.5)
PROT UR QL STRIP: NEGATIVE
RBC # BLD AUTO: 4.31 10*6/MM3 (ref 3.77–5.28)
SODIUM SERPL-SCNC: 139 MMOL/L (ref 136–145)
SP GR UR STRIP: 1.02 (ref 1–1.03)
UROBILINOGEN UR QL STRIP: ABNORMAL
WBC # BLD AUTO: 7.99 10*3/MM3 (ref 3.4–10.8)
WHOLE BLOOD HOLD SPECIMEN: NORMAL

## 2021-09-25 PROCEDURE — 81003 URINALYSIS AUTO W/O SCOPE: CPT | Performed by: FAMILY MEDICINE

## 2021-09-25 PROCEDURE — 83690 ASSAY OF LIPASE: CPT | Performed by: FAMILY MEDICINE

## 2021-09-25 PROCEDURE — 80053 COMPREHEN METABOLIC PANEL: CPT | Performed by: FAMILY MEDICINE

## 2021-09-25 PROCEDURE — 99283 EMERGENCY DEPT VISIT LOW MDM: CPT

## 2021-09-25 PROCEDURE — 74176 CT ABD & PELVIS W/O CONTRAST: CPT

## 2021-09-25 PROCEDURE — 25010000002 MORPHINE PER 10 MG: Performed by: FAMILY MEDICINE

## 2021-09-25 PROCEDURE — 96374 THER/PROPH/DIAG INJ IV PUSH: CPT

## 2021-09-25 PROCEDURE — 96375 TX/PRO/DX INJ NEW DRUG ADDON: CPT

## 2021-09-25 PROCEDURE — 81025 URINE PREGNANCY TEST: CPT | Performed by: FAMILY MEDICINE

## 2021-09-25 PROCEDURE — 85025 COMPLETE CBC W/AUTO DIFF WBC: CPT | Performed by: FAMILY MEDICINE

## 2021-09-25 PROCEDURE — 25010000002 KETOROLAC TROMETHAMINE PER 15 MG: Performed by: FAMILY MEDICINE

## 2021-09-25 RX ORDER — FERROUS SULFATE TAB EC 324 MG (65 MG FE EQUIVALENT) 324 (65 FE) MG
324 TABLET DELAYED RESPONSE ORAL
COMMUNITY
End: 2022-12-15

## 2021-09-25 RX ORDER — SODIUM CHLORIDE 0.9 % (FLUSH) 0.9 %
10 SYRINGE (ML) INJECTION AS NEEDED
Status: DISCONTINUED | OUTPATIENT
Start: 2021-09-25 | End: 2021-09-25 | Stop reason: HOSPADM

## 2021-09-25 RX ORDER — KETOROLAC TROMETHAMINE 30 MG/ML
30 INJECTION, SOLUTION INTRAMUSCULAR; INTRAVENOUS ONCE
Status: COMPLETED | OUTPATIENT
Start: 2021-09-25 | End: 2021-09-25

## 2021-09-25 RX ADMIN — KETOROLAC TROMETHAMINE 30 MG: 30 INJECTION, SOLUTION INTRAMUSCULAR; INTRAVENOUS at 03:52

## 2021-09-25 RX ADMIN — Medication 10 ML: at 03:52

## 2021-09-25 RX ADMIN — MORPHINE SULFATE 4 MG: 4 INJECTION INTRAVENOUS at 01:54

## 2021-09-25 NOTE — ED PROVIDER NOTES
Subjective     History provided by:  Patient   used: No    The patient is a 26 years old female who presented here today because of left flank pain for the past 1 hour.  She said that is progressively getting worse.  Denies any fever but has chills.  Denies any radiation of pain.  Denies any nausea vomiting.  Denies any dysuria or increased frequency of urine.    Review of Systems   Gastrointestinal: Positive for abdominal pain.   All other systems reviewed and are negative.      Past Medical History:   Diagnosis Date   • Anemia during pregnancy in third trimester 2017   • Anxiety    • Chest pain, unspecified    • Contact dermatitis due to poison ivy    • Exanthem due to herpes zoster    • H/O echocardiogram 12/15/2015    Normal LV function with Ef 60% without regional wall motion abnormalities. Normal Ev size with normal function. Normal diastolic function. No significant valvular regurgitation or stenosis   • Herpes zoster ophthalmicus     no ocular involvement      • History of chicken pox    • History of Holter monitoring 12/15/2015    Sinus mechanism with normal average heart rate with no evicence of chronotropic incompetence. Normal burden of ventricular and supraventricular ectopic event. Symptoms correlated only with sinus mechanism   • Migraine    • Obesity affecting pregnancy 2017   • Palpitations    • Shingles    • Temporomandibular joint disorder        No Known Allergies    Past Surgical History:   Procedure Laterality Date   •  SECTION N/A 2017    Procedure:  SECTION PRIMARY;  Surgeon: Jarad Oliver MD;  Location: [facility] LABOR DELIVERY;  Service:    • CHOLECYSTECTOMY WITH INTRAOPERATIVE CHOLANGIOGRAM N/A 2020    Procedure: LAPAROSCOPIC CHOLECYSTECTOMY WITH INTRAOPERATIVE CHOLANGIOGRAM               (c-arm#1);  Surgeon: Tristin Brownlee MD;  Location: [facility] OR;  Service: General;  Laterality: N/A;   • FOREIGN BODY REMOVAL Left 2017     "splinter removed from left great toe. nerve block used.    • TUBAL ABDOMINAL LIGATION     • WISDOM TOOTH EXTRACTION         Family History   Adopted: Yes   Problem Relation Age of Onset   • Hyperthyroidism Mother    • Esophageal cancer Mother    • Hyperthyroidism Sister    • ADD / ADHD Brother    • ADD / ADHD Brother        Social History     Socioeconomic History   • Marital status:      Spouse name: Not on file   • Number of children: Not on file   • Years of education: Not on file   • Highest education level: Not on file   Tobacco Use   • Smoking status: Never Smoker   • Smokeless tobacco: Never Used   Substance and Sexual Activity   • Alcohol use: Yes     Comment: occasionally   • Drug use: No   • Sexual activity: Yes     Partners: Male     Birth control/protection: Surgical       /86   Pulse 74   Temp 97.1 °F (36.2 °C) (Temporal)   Resp 20   Ht 160 cm (63\")   Wt 84.8 kg (187 lb)   SpO2 98%   BMI 33.13 kg/m²     Objective   Physical Exam  Vitals and nursing note reviewed.   Constitutional:       Appearance: Normal appearance. She is obese.   HENT:      Head: Normocephalic and atraumatic.      Right Ear: Tympanic membrane, ear canal and external ear normal.      Left Ear: Tympanic membrane, ear canal and external ear normal.      Nose: Nose normal.   Eyes:      Extraocular Movements: Extraocular movements intact.      Conjunctiva/sclera: Conjunctivae normal.      Pupils: Pupils are equal, round, and reactive to light.   Cardiovascular:      Rate and Rhythm: Normal rate and regular rhythm.      Pulses: Normal pulses.      Heart sounds: Normal heart sounds.   Pulmonary:      Effort: Pulmonary effort is normal.      Breath sounds: Normal breath sounds.   Abdominal:      General: Abdomen is flat. Bowel sounds are normal.      Palpations: Abdomen is soft.   Musculoskeletal:         General: Normal range of motion.      Cervical back: Normal range of motion and neck supple.   Skin:     General: Skin " is warm.      Capillary Refill: Capillary refill takes less than 2 seconds.   Neurological:      General: No focal deficit present.      Mental Status: She is alert and oriented to person, place, and time.   Psychiatric:         Mood and Affect: Mood normal.         Behavior: Behavior normal.         Thought Content: Thought content normal.         Judgment: Judgment normal.         Procedures           ED Course  ED Course as of Sep 25 0340   Sat Sep 25, 2021   0340 Results were discussed patient.  Told to follow-up with Dr. Diaz in 3 days.  Come back to ER symptoms get worse.    [MO]      ED Course User Index  [MO] Kash Haque MD            Labs Reviewed   COMPREHENSIVE METABOLIC PANEL - Abnormal; Notable for the following components:       Result Value    Glucose 117 (*)     All other components within normal limits    Narrative:     GFR Normal >60  Chronic Kidney Disease <60  Kidney Failure <15     URINALYSIS W/ MICROSCOPIC IF INDICATED (NO CULTURE) - Abnormal; Notable for the following components:    Appearance, UA Cloudy (*)     All other components within normal limits    Narrative:     Urine microscopic not indicated.   CBC WITH AUTO DIFFERENTIAL - Abnormal; Notable for the following components:    Eosinophil % 0.0 (*)     All other components within normal limits   LIPASE - Normal   PREGNANCY, URINE - Normal   RAINBOW DRAW    Narrative:     The following orders were created for panel order [place] Draw.  Procedure                               Abnormality         Status                     ---------                               -----------         ------                     Green Top (Gel)[[id number]]                                  Final result               Lavender Top[[id number]]                                     Final result               Gold Top - SST[[id number]]                                   Final result               Light Blue Top[[id number]]                                                                 Please view results for these tests on the individual orders.   CBC AND DIFFERENTIAL    Narrative:     The following orders were created for panel order CBC & Differential.  Procedure                               Abnormality         Status                     ---------                               -----------         ------                     CBC Auto Differential[[id number]]        Abnormal            Final result                 Please view results for these tests on the individual orders.   GREEN TOP   LAVENDER TOP   GOLD TOP - SST       CT Abdomen Pelvis Stone Protocol   Final Result   3 mm calcification centrally within the dependent portion of the   urinary bladder that likely represents a passed stone from the   left ureter    1.  Nonobstructing nephroliths within both kidneys.   2.  Scarring to both kidneys.      Electronically signed by:  Chris Hernandez MD  9/25/2021 2:58 AM CDT   Workstation: [id number]                                        [facility]    Final diagnoses:   Kidney stone       ED Disposition  ED Disposition     ED Disposition Condition Comment    Discharge Stable           [place], Rui HEDRICK MD  [address]  [facility] [address]  [phone]    In 3 days           Medication List      No changes were made to your prescriptions during this visit.          Kash Haque MD  09/25/21 2432

## 2021-09-25 NOTE — ED NOTES
Pt states that she has a history of UTIs and kidney stones. She states that she began having severe pain around an hour ago in the left flank area. She states that the last time she was in here was for a kidney infection and she felt like she does today. She states that she took a norco around 30 minutes ago and it has not helped.      Jessi Castillo, RN  09/25/21 0123

## 2021-09-25 NOTE — ED NOTES
"Pt states \"I feel like I had a stone pass from my kidney to my bladder and now I feel so much better. I'm not sure if it was because of the morphine or if a stone actually passed.\" Pt does appear to be feeling much better.      Jessi Castillo, RN  09/25/21 0240    "

## 2021-09-29 ENCOUNTER — OFFICE VISIT (OUTPATIENT)
Dept: FAMILY MEDICINE CLINIC | Facility: CLINIC | Age: 26
End: 2021-09-29

## 2021-09-29 VITALS
HEART RATE: 96 BPM | OXYGEN SATURATION: 98 % | DIASTOLIC BLOOD PRESSURE: 88 MMHG | BODY MASS INDEX: 33.31 KG/M2 | WEIGHT: 188 LBS | HEIGHT: 63 IN | SYSTOLIC BLOOD PRESSURE: 110 MMHG

## 2021-09-29 DIAGNOSIS — D68.51 FACTOR 5 LEIDEN MUTATION, HETEROZYGOUS (HCC): ICD-10-CM

## 2021-09-29 DIAGNOSIS — N90.7 SEBACEOUS CYST OF LABIA: ICD-10-CM

## 2021-09-29 DIAGNOSIS — Z12.4 PAP SMEAR FOR CERVICAL CANCER SCREENING: ICD-10-CM

## 2021-09-29 DIAGNOSIS — Z00.00 ANNUAL PHYSICAL EXAM: Primary | ICD-10-CM

## 2021-09-29 DIAGNOSIS — F90.2 ATTENTION DEFICIT HYPERACTIVITY DISORDER (ADHD), COMBINED TYPE: ICD-10-CM

## 2021-09-29 DIAGNOSIS — Z82.49 FAMILY HISTORY OF DVT: ICD-10-CM

## 2021-09-29 PROCEDURE — 99395 PREV VISIT EST AGE 18-39: CPT | Performed by: FAMILY MEDICINE

## 2021-09-29 RX ORDER — DEXTROAMPHETAMINE SACCHARATE, AMPHETAMINE ASPARTATE MONOHYDRATE, DEXTROAMPHETAMINE SULFATE AND AMPHETAMINE SULFATE 5; 5; 5; 5 MG/1; MG/1; MG/1; MG/1
20 CAPSULE, EXTENDED RELEASE ORAL EVERY MORNING
Qty: 30 CAPSULE | Refills: 0 | Status: SHIPPED | OUTPATIENT
Start: 2021-09-29 | End: 2021-12-09

## 2021-09-29 NOTE — PROGRESS NOTES
"Chief Complaint  Annual Exam    Subjective          Tanja Brice presents to [facility] - [place]  History of Present Illness    Tanja Brice is a 26 y.o. year old presenting to be seen for her annual exam.      Concerns: Recent kidney stone, passed.  Heterozygous for Factor V Leiden, no personal history of VTE.  Patient's father is on anticoagulation for DVT.  He is positive for prothrombin and factor 5 leiden.  Patient has recurrent labial cyst over the last 1.5 years.  Says it has been tender/infected and drained at home approximately 4 times during this time period.     She is sexually active.  In the past 12 months there has not been new sexual partners. She would not like to be screened for STD's at today's exam. Using tubal ligation for contraception.      Exercises regularly: yes, light weight lifting  Healthy Diet:yes.  Wears seat belt:yes.  Concerns about domestic violence: no.  Last colonoscopy or FIT test: N/A  Last PAP: due  Last Mammo: N/A  Periods Regular: yes.    OB History        3    Para   3    Term   3            AB        Living   3       SAB        TAB        Ectopic        Molar        Multiple        Live Births   3                The following portions of the patient's history were reviewed and updated as appropriate:problem list, current medications, allergies, past family history, past medical history, past social history and past surgical history.    Objective   Vital Signs:   /88   Pulse 96   Ht 160 cm (63\")   Wt 85.3 kg (188 lb)   SpO2 98%   BMI 33.30 kg/m²     Physical Exam  Vitals reviewed. Exam conducted with a chaperone present.   Constitutional:       General: She is not in acute distress.     Appearance: She is well-developed.   HENT:      Head: Normocephalic and atraumatic.   Cardiovascular:      Rate and Rhythm: Normal rate and regular rhythm.      Heart sounds: Normal heart sounds. No " murmur heard.     Pulmonary:      Effort: Pulmonary effort is normal. No respiratory distress.      Breath sounds: Normal breath sounds. No wheezing or rales.   Chest:      Breasts:         Right: Normal.         Left: Normal.      Comments: Fibrocystic breast   Abdominal:      Palpations: Abdomen is soft.      Tenderness: There is no abdominal tenderness.   Genitourinary:     Labia:         Right: No rash.         Left: No rash.       Vagina: Normal.      Cervix: Normal.      Adnexa: Right adnexa normal.        Right: No mass or tenderness.          Left: Tenderness present. No mass.            Comments: Small, cystic lesion of the skin, mildly tender  Musculoskeletal:      Cervical back: Neck supple.   Skin:     General: Skin is warm and dry.      Findings: No rash.   Neurological:      Mental Status: She is alert and oriented to person, place, and time.        Result Review :   The following data was reviewed by: Brooke Denise MD on 09/29/2021:  Common labs    Common Labsle 9/25/21 9/25/21    0151 0151   Glucose  117 (A)   BUN  7   Creatinine  0.77   eGFR Non African Am  89   Sodium  139   Potassium  3.6   Chloride  106   Calcium  8.7   Albumin  4.20   Total Bilirubin  0.2   Alkaline Phosphatase  62   AST (SGOT)  15   ALT (SGPT)  18   WBC 7.99    Hemoglobin 12.3    Hematocrit 36.2    Platelets 295    Hemoglobin A1C     (A) Abnormal value                      Assessment and Plan    Diagnoses and all orders for this visit:    1. Annual physical exam (Primary)    2. Pap smear for cervical cancer screening  -     Cancel: Liquid-based Pap Smear, Screening; Future  -     Liquid-based Pap Smear, Screening    3. Factor 5 Leiden mutation, heterozygous (CMS/HCC)  -     Ambulatory Referral to Hematology / Oncology    4. Family history of DVT  -     Ambulatory Referral to Hematology / Oncology    5. Attention deficit hyperactivity disorder (ADHD), combined type  -     amphetamine-dextroamphetamine XR (ADDERALL XR) 20 MG  24 hr capsule; Take 1 capsule by mouth Every Morning  Dispense: 30 capsule; Refill: 0    6. Sebaceous cyst of labia  -     Ambulatory Referral to Obstetrics / Gynecology      Patient presents today for annual exam  Due for pap smear, done today  Factor 5 Leiden heterozygous, will refer to hematology/oncology provider that sees her father with the same mutation  ADHD controlled, continue current medication  Lesion on labia most consistent with sebaceous cyst  Not currently infected, but multiple infections over the last year  Intermittent tenderness at work  Will refer to OB/Gyn to consider excision  Counseled on continued healthy diet and exercise            Follow Up   Return in about 3 months (around 12/29/2021) for Recheck.  Patient was given instructions and counseling regarding her condition or for health maintenance advice. Please see specific information pulled into the AVS if appropriate.         This document has been electronically signed by Brooke Denise MD

## 2021-10-04 LAB
LAB AP CASE REPORT: NORMAL
PATH INTERP SPEC-IMP: NORMAL

## 2021-10-05 ENCOUNTER — TELEPHONE (OUTPATIENT)
Dept: FAMILY MEDICINE CLINIC | Facility: CLINIC | Age: 26
End: 2021-10-05

## 2021-10-05 NOTE — TELEPHONE ENCOUNTER
Please let patient know that pap smear for screening negative.  According to [facility]P recommendations, repeat screening in 3 years.  ThanksSUZY

## 2021-10-08 ENCOUNTER — OFFICE VISIT (OUTPATIENT)
Dept: OBSTETRICS AND GYNECOLOGY | Facility: CLINIC | Age: 26
End: 2021-10-08

## 2021-10-08 VITALS
HEIGHT: 63 IN | DIASTOLIC BLOOD PRESSURE: 72 MMHG | WEIGHT: 188 LBS | BODY MASS INDEX: 33.31 KG/M2 | SYSTOLIC BLOOD PRESSURE: 106 MMHG

## 2021-10-08 DIAGNOSIS — N90.89 LABIAL LESION: ICD-10-CM

## 2021-10-08 DIAGNOSIS — N63.0 BREAST LUMP: Primary | ICD-10-CM

## 2021-10-08 PROCEDURE — 99203 OFFICE O/P NEW LOW 30 MIN: CPT | Performed by: OBSTETRICS & GYNECOLOGY

## 2021-10-08 NOTE — PROGRESS NOTES
Tanja Brice is a 26 y.o. y/o female.     Chief Complaint: Right breast mass and labial lesion    HPI:   26 y.o. .  Patient's last menstrual period was 10/06/2021..  Patient presents for evaluation of breast mass and labial lesion.  She recently had a well woman exam with primary care who felt a fibrous area along the outer quadrants of the right breast.  Patient is concerned about these findings.  Patient also has a small lesion on the left labia that comes and goes likely a sebaceous cyst becomes very painful when it becomes inflamed and swollen.     Review of Systems   Constitutional: Negative for chills, fatigue and fever.   HENT: Negative for sore throat.    Eyes: Negative for visual disturbance.   Respiratory: Negative for cough, shortness of breath and wheezing.    Cardiovascular: Negative for chest pain, palpitations and leg swelling.   Gastrointestinal: Negative for abdominal pain, diarrhea, nausea and vomiting.   Endocrine: Negative for cold intolerance and heat intolerance.   Genitourinary: Positive for vaginal pain. Negative for dysuria, flank pain, frequency, menstrual problem, pelvic pain, vaginal bleeding and vaginal discharge.   Skin: Negative for color change and pallor.   Neurological: Negative for syncope, light-headedness and headaches.   Psychiatric/Behavioral: Negative for dysphoric mood and suicidal ideas. The patient is not nervous/anxious.         The following portions of the patient's history were reviewed and updated as appropriate: allergies, current medications, past family history, past medical history, past social history, past surgical history and problem list.    No Known Allergies     Prior to Admission medications    Medication Sig Start Date End Date Taking? Authorizing Provider   amphetamine-dextroamphetamine XR (ADDERALL XR) 20 MG 24 hr capsule Take 1 capsule by mouth Every Morning 21  Yes Brooke Denise MD   CRANBERRY EXTRACT PO Take  by mouth 2 (two)  times a day.   Yes Provider, MD Kaz   ferrous sulfate 324 (65 Fe) MG tablet delayed-release EC tablet Take 324 mg by mouth Daily With Breakfast.   Yes Provider, MD Kaz        The patient has a family history of   Family History   Adopted: Yes   Problem Relation Age of Onset   • Hyperthyroidism Mother    • Esophageal cancer Mother    • Hyperthyroidism Sister    • ADD / ADHD Brother    • ADD / ADHD Brother         Past Medical History:   Diagnosis Date   • Anemia during pregnancy in third trimester 2017   • Anxiety    • Chest pain, unspecified    • Contact dermatitis due to poison ivy    • Exanthem due to herpes zoster    • H/O echocardiogram 12/15/2015    Normal LV function with Ef 60% without regional wall motion abnormalities. Normal Ev size with normal function. Normal diastolic function. No significant valvular regurgitation or stenosis   • Herpes zoster ophthalmicus     no ocular involvement      • History of chicken pox    • History of Holter monitoring 12/15/2015    Sinus mechanism with normal average heart rate with no evicence of chronotropic incompetence. Normal burden of ventricular and supraventricular ectopic event. Symptoms correlated only with sinus mechanism   • Migraine    • Obesity affecting pregnancy 2017   • Palpitations    • Shingles    • Temporomandibular joint disorder         OB History        3    Para   3    Term   3            AB        Living   3       SAB        TAB        Ectopic        Molar        Multiple        Live Births   3                 Social History     Socioeconomic History   • Marital status:      Spouse name: Not on file   • Number of children: Not on file   • Years of education: Not on file   • Highest education level: Not on file   Tobacco Use   • Smoking status: Never Smoker   • Smokeless tobacco: Never Used   Substance and Sexual Activity   • Alcohol use: Yes     Comment: occasionally   • Drug use: No   • Sexual activity:  "Yes     Partners: Male     Birth control/protection: Surgical        Past Surgical History:   Procedure Laterality Date   •  SECTION N/A 2017    Procedure:  SECTION PRIMARY;  Surgeon: Jarad Oliver MD;  Location: [facility] LABOR DELIVERY;  Service:    • CHOLECYSTECTOMY WITH INTRAOPERATIVE CHOLANGIOGRAM N/A 2020    Procedure: LAPAROSCOPIC CHOLECYSTECTOMY WITH INTRAOPERATIVE CHOLANGIOGRAM               (c-arm#1);  Surgeon: Tristin Brownlee MD;  Location: [facility] OR;  Service: General;  Laterality: N/A;   • FOREIGN BODY REMOVAL Left 2017    splinter removed from left great toe. nerve block used.    • TUBAL ABDOMINAL LIGATION     • WISDOM TOOTH EXTRACTION          Patient Active Problem List   Diagnosis   • Factor 5 Leiden mutation, heterozygous (HCC)        Documented Vitals    10/08/21 0856   BP: 106/72   Weight: 85.3 kg (188 lb)   Height: 160 cm (63\")   PainSc: 0-No pain        Body mass index is 33.3 kg/m².    Physical Exam  Vitals and nursing note reviewed.   Constitutional:       General: She is not in acute distress.     Appearance: Normal appearance. She is well-developed. She is not ill-appearing, toxic-appearing or diaphoretic.   HENT:      Head: Normocephalic.      Mouth/Throat:      Mouth: Mucous membranes are moist.   Neck:      Thyroid: No thyromegaly.   Chest:      Breasts:         Right: Mass present. No swelling, bleeding, inverted nipple, nipple discharge, skin change or tenderness.         Left: Normal. No swelling, bleeding, inverted nipple, mass, nipple discharge, skin change or tenderness.      Comments: Right breast with mobile smooth mass approximately 1.5 cm in size noted at the 7 o'clock position approximately 5 cm distal from the nipple.  Nontender overall likely uncomplicated mass.  Genitourinary:     Vagina: Normal.      Comments: Small cystic structure on the inferior portion of the left labium majora near the posterior fourchette.  Approximately 2 mm in " size no fluctuance and nontender at this time.  Musculoskeletal:         General: No swelling, tenderness or deformity. Normal range of motion.      Cervical back: Normal range of motion.   Skin:     General: Skin is warm and dry.      Findings: No erythema.   Neurological:      General: No focal deficit present.      Mental Status: She is alert and oriented to person, place, and time.   Psychiatric:         Mood and Affect: Mood normal.         Behavior: Behavior normal.         Thought Content: Thought content normal.         Judgment: Judgment normal.         Laboratory Data:   Lab Results - Last 18 Months   Lab Units 09/25/21  0151 06/28/21  0912 08/15/20  0333 08/13/20  0727 07/01/20  0931   GLUCOSE mg/dL 117* 80 129* 96 96   BUN mg/dL 7 7 7 8 9   CREATININE mg/dL 0.77 0.83 0.72 0.70 0.84   SODIUM mmol/L 139 140 137 140 143   POTASSIUM mmol/L 3.6 5.1 4.0 4.3 4.6   CHLORIDE mmol/L 106 106 100 104 107   CO2 mmol/L 24.0 25.4 23.0 24.0 22.2   CALCIUM mg/dL 8.7 9.0 9.3 8.9 9.7   TOTAL PROTEIN g/dL 6.8 6.9 7.7 6.8 7.7   ALBUMIN g/dL 4.20 4.40 4.50 4.10 4.60   ALT (SGPT) U/L 18 26 40* 26 23   AST (SGOT) U/L 15 20 25 26 20   ALK PHOS U/L 62 66 71 64 67   BILIRUBIN mg/dL 0.2 0.3 0.7 0.3 0.3   EGFR IF NONAFRICN AM mL/min/1.73 89 82 97 100 81   GLOBULIN gm/dL 2.6 2.5 3.2 2.7 3.1   A/G RATIO g/dL 1.6 1.8 1.4 1.5 1.5   BUN / CREAT RATIO  9.1 8.4 9.7 11.4 10.7   ANION GAP mmol/L 9.0 8.6 14.0 12.0 13.8     Lab Results - Last 18 Months   Lab Units 09/25/21  0151 06/28/21  0912 08/15/20  0333 07/01/20  0931   WBC 10*3/mm3 7.99 7.13 14.42* 6.26   RBC 10*6/mm3 4.31 4.73 4.75 4.50   HEMOGLOBIN g/dL 12.3 13.2 12.9 12.8   HEMATOCRIT % 36.2 39.4 39.3 37.0   MCV fL 84.0 83.3 82.7 82.2   MCH pg 28.5 27.9 27.2 28.4   MCHC g/dL 34.0 33.5 32.8 34.6   RDW % 12.6 13.1 12.8 13.4   RDW-SD fl 38.6 39.5 38.4 39.9   MPV fL 9.5 10.7 9.7 10.9   PLATELETS 10*3/mm3 295 308 337 297     No results for input(s): HCGQUAL in the last 26698 hours.    Last  Pap smear:   Last Completed Pap Smear          PAP SMEAR (Every 3 Years) Next due on 9/29/2024 09/29/2021  Liquid-based Pap Smear, Screening    05/01/2017  Liquid-based Pap Smear, Screening            Up-to-date  Last mammography:   Last Completed Mammogram     This patient has no relevant Health Maintenance data.      Does not meet age criteria    Assessment/Plan   Diagnoses and all orders for this visit:    1. Breast lump (Primary)  -     US Breast Right Complete; Future    2. Labial lesion      Patient with labial lesion.  Right now labial lesion is quite small and not bothering her I discussed punch biopsy removal here in the office versus waiting till this gets bigger and possible drainage in the operating room.  Patient was to wait and see what happens if this cyst returns.  Also mass in right breast.  We will send patient for breast ultrasound for further evaluation.    This document has been electronically signed by Gregg Skinner DO on October 8, 2021 09:18 CDT

## 2021-10-11 DIAGNOSIS — N20.0 MULTIPLE KIDNEY STONES: Primary | ICD-10-CM

## 2021-10-14 ENCOUNTER — LAB (OUTPATIENT)
Dept: ONCOLOGY | Facility: HOSPITAL | Age: 26
End: 2021-10-14

## 2021-10-14 ENCOUNTER — CONSULT (OUTPATIENT)
Dept: ONCOLOGY | Facility: CLINIC | Age: 26
End: 2021-10-14

## 2021-10-14 VITALS
TEMPERATURE: 98 F | OXYGEN SATURATION: 100 % | DIASTOLIC BLOOD PRESSURE: 86 MMHG | BODY MASS INDEX: 33.57 KG/M2 | HEART RATE: 91 BPM | SYSTOLIC BLOOD PRESSURE: 126 MMHG | WEIGHT: 189.5 LBS

## 2021-10-14 DIAGNOSIS — D68.51 FACTOR 5 LEIDEN MUTATION, HETEROZYGOUS (HCC): Primary | ICD-10-CM

## 2021-10-14 PROCEDURE — G0463 HOSPITAL OUTPT CLINIC VISIT: HCPCS | Performed by: INTERNAL MEDICINE

## 2021-10-14 PROCEDURE — 99203 OFFICE O/P NEW LOW 30 MIN: CPT | Performed by: INTERNAL MEDICINE

## 2021-10-18 ENCOUNTER — LAB (OUTPATIENT)
Dept: LAB | Facility: HOSPITAL | Age: 26
End: 2021-10-18

## 2021-10-18 DIAGNOSIS — N20.0 MULTIPLE KIDNEY STONES: ICD-10-CM

## 2021-10-18 PROCEDURE — 83935 ASSAY OF URINE OSMOLALITY: CPT

## 2021-10-18 PROCEDURE — 84392 ASSAY OF URINE SULFATE: CPT

## 2021-10-18 PROCEDURE — 84300 ASSAY OF URINE SODIUM: CPT

## 2021-10-18 PROCEDURE — 81003 URINALYSIS AUTO W/O SCOPE: CPT

## 2021-10-18 PROCEDURE — 82140 ASSAY OF AMMONIA: CPT

## 2021-10-18 PROCEDURE — 83945 ASSAY OF OXALATE: CPT

## 2021-10-18 PROCEDURE — 84560 ASSAY OF URINE/URIC ACID: CPT

## 2021-10-18 PROCEDURE — 82507 ASSAY OF CITRATE: CPT

## 2021-10-18 PROCEDURE — 83735 ASSAY OF MAGNESIUM: CPT

## 2021-10-18 PROCEDURE — 82570 ASSAY OF URINE CREATININE: CPT

## 2021-10-18 PROCEDURE — 84105 ASSAY OF URINE PHOSPHORUS: CPT

## 2021-10-18 PROCEDURE — 82340 ASSAY OF CALCIUM IN URINE: CPT

## 2021-10-18 PROCEDURE — 82131 AMINO ACIDS SINGLE QUANT: CPT

## 2021-10-18 PROCEDURE — 82436 ASSAY OF URINE CHLORIDE: CPT

## 2021-10-18 PROCEDURE — 84133 ASSAY OF URINE POTASSIUM: CPT

## 2021-10-18 NOTE — PROGRESS NOTES
Chief Complaint  Factor V Leiden mutation    Subjective          Tanja Brice presents to [facility]  History of Present Illness     This is a pleasant 26-year-old female who was seen in consultation at the request of Brooke Denise MD for evaluation of factor V Leiden heterozygous mutation.  Patient is a fair historian.  Her father who is also a patient of mine was recently diagnosed with stroke as well as DVT.  Further testing revealed factor V Leiden heterozygous status.  Subsequently, patient was interested in testing and factor V Leiden testing was ordered through her primary care provider which came back positive for factor V Leiden heterozygous mutation.  Patient does not have any prior history of arterial venous thrombosis.  She has had 2 pregnancies without any VTE concerns.  She has taken 1 month of oral contraceptive pills however was unable to tolerate it due to excessive bleeding and stopped after 1 month.  Fortunately she did not have any venous thromboembolism.  I been asked to assist with evaluation and management of her factor V Leiden heterozygous mutation.    Objective   Vital Signs:   /86   Pulse 91   Temp 98 °F (36.7 °C)   Wt 86 kg (189 lb 8 oz)   SpO2 100%   BMI 33.57 kg/m²     Physical Exam  Vitals and nursing note reviewed.   Constitutional:       Appearance: Normal appearance.   Neurological:      General: No focal deficit present.      Mental Status: She is alert and oriented to person, place, and time. Mental status is at baseline.   Psychiatric:         Mood and Affect: Mood normal.         Behavior: Behavior normal.         Thought Content: Thought content normal.        Result Review :   The following data was reviewed by: Jake Burgos MD on 10/14/2021:  Common labs    Common Labsle 6/28/21 6/28/21 6/28/21 9/25/21 9/25/21    0912 0912 0912 0151 0151   Glucose  80   117 (A)   BUN  7   7   Creatinine   0.83   0.77   eGFR Non African Am  82   89   Sodium  140   139   Potassium  5.1   3.6   Chloride  106   106   Calcium  9.0   8.7   Albumin  4.40   4.20   Total Bilirubin  0.3   0.2   Alkaline Phosphatase  66   62   AST (SGOT)  20   15   ALT (SGPT)  26   18   WBC   7.13 7.99    Hemoglobin   13.2 12.3    Hematocrit   39.4 36.2    Platelets   308 295    Hemoglobin A1C 5.20       (A) Abnormal value            CMP    CMP 6/28/21 9/25/21   Glucose 80 117 (A)   BUN 7 7   Creatinine 0.83 0.77   eGFR Non African Am 82 89   Sodium 140 139   Potassium 5.1 3.6   Chloride 106 106   Calcium 9.0 8.7   Albumin 4.40 4.20   Total Bilirubin 0.3 0.2   Alkaline Phosphatase 66 62   AST (SGOT) 20 15   ALT (SGPT) 26 18   (A) Abnormal value            CBC    CBC 6/28/21 9/25/21   WBC 7.13 7.99   RBC 4.73 4.31   Hemoglobin 13.2 12.3   Hematocrit 39.4 36.2   MCV 83.3 84.0   MCH 27.9 28.5   MCHC 33.5 34.0   RDW 13.1 12.6   Platelets 308 295           CBC w/diff    CBC w/Diff 6/28/21 9/25/21   WBC 7.13 7.99   RBC 4.73 4.31   Hemoglobin 13.2 12.3   Hematocrit 39.4 36.2   MCV 83.3 84.0   MCH 27.9 28.5   MCHC 33.5 34.0   RDW 13.1 12.6   Platelets 308 295   Neutrophil Rel % 63.5 57.1   Immature Granulocyte Rel % 0.6 (A) 0.3   Lymphocyte Rel % 28.8 35.4   Monocyte Rel % 6.7 6.6   Eosinophil Rel % 0.0 (A) 0.0 (A)   Basophil Rel % 0.4 0.6   (A) Abnormal value                     Tanja Brice reports a pain score of 5.  Given her pain assessment as noted, treatment options were discussed and the following options were decided upon as a follow-up plan to address the patient's pain: referral to Primary Care for assistance in pain treatment guidance.    Patient screened positive for depression based on a PHQ-9 score of 0 on 10/14/2021. Follow-up recommendations include: Suicide Risk Assessment performed.      Assessment and Plan    Diagnoses and all orders for this visit:    1. Factor 5 Leiden mutation, heterozygous (HCC) (Primary)    This is a  new diagnosis for me.  Patient's dad was recently diagnosed with factor V Leiden heterozygous mutation due to DVT and stroke and patient was tested by her primary care provider and was noticed to have factor V Leiden heterozygous mutation.  Patient herself has never had any venous thromboembolism.  She has had two pregnancies which were uneventful.  She did take combined oral contraceptive pills for a month however did not tolerated treatment due to excessive bleeding.  Subsequently the treatments were stopped.  Fortunately she did not develop any blood clots.  I had a very lengthy discussion with patient about her diagnosis of factor V Leiden heterozygous mutation.  Discussed that even though she does have higher risk of venous thromboembolism only a small percentage of individuals with factor V Leiden will develop VTE in the lifetime.  Even though factor V Leiden has been implicated in arterial thrombosis there are mixed.  If present is likely to be small risk compared to risk of VTE.  Also discussed that even with increased risk of VTE factor V Leiden heterozygous status does not increase overall mortality.    I recommend she should be aware of her high risk of VTE and consider prophylactic anticoagulants or antiplatelet agents in high risk situations such as acute medical illness, surgery, prolonged immobilization.  I recommend she should be ambulating and performing leg exercises while seated on a long travel or prolonged sitting.    I recommend she should avoid estrogen containing oral contraceptive pills.  She is not planning any further pregnancy.    Discussed that the testing for family member should be individualized.  Regardless family member should be aware of increased risk of VTE in general given positive family history of DVT.    All questions answered.    Recommendations:   · Discussed diagnosis, natural history, prognosis and treatment options at length.  · No role for prophylactic anticoagulants or  antiplatelets.  · Consider anticoagulation or antiplatelet in high risk situations such as acute medical illness, surgery, prolonged immobilization.  · Recommend avoid estrogen containing hormonal supplements.  · Individualized testing for patient's close family members.  · Recommend healthy lifestyle -physical activity, healthy eating habits.    I spent 40 minutes caring for Tanja on this date of service. This time includes time spent by me in the following activities: preparing for the visit, reviewing tests, obtaining and/or reviewing a separately obtained history, performing a medically appropriate examination and/or evaluation, counseling and educating the patient/family/caregiver, ordering medications, tests, or procedures, referring and communicating with other health care professionals, documenting information in the medical record, independently interpreting results and communicating that information with the patient/family/caregiver and care coordination        Follow Up   No follow-ups on file.  Patient was given instructions and counseling regarding her condition or for health maintenance advice. Please see specific information pulled into the AVS if appropriate.

## 2021-10-19 ENCOUNTER — LAB (OUTPATIENT)
Dept: LAB | Facility: HOSPITAL | Age: 26
End: 2021-10-19

## 2021-10-19 DIAGNOSIS — N20.0 MULTIPLE KIDNEY STONES: ICD-10-CM

## 2021-10-19 LAB
ALBUMIN SERPL-MCNC: 4.4 G/DL (ref 3.5–5.2)
ALBUMIN/GLOB SERPL: 1.7 G/DL
ALP SERPL-CCNC: 59 U/L (ref 39–117)
ALT SERPL W P-5'-P-CCNC: 16 U/L (ref 1–33)
ANION GAP SERPL CALCULATED.3IONS-SCNC: 9 MMOL/L (ref 5–15)
AST SERPL-CCNC: 14 U/L (ref 1–32)
BILIRUB SERPL-MCNC: <0.2 MG/DL (ref 0–1.2)
BUN SERPL-MCNC: 7 MG/DL (ref 6–20)
BUN/CREAT SERPL: 9.6 (ref 7–25)
CALCIUM SPEC-SCNC: 9.3 MG/DL (ref 8.6–10.5)
CHLORIDE SERPL-SCNC: 105 MMOL/L (ref 98–107)
CO2 SERPL-SCNC: 28 MMOL/L (ref 22–29)
CREAT SERPL-MCNC: 0.73 MG/DL (ref 0.57–1)
GFR SERPL CREATININE-BSD FRML MDRD: 95 ML/MIN/1.73
GLOBULIN UR ELPH-MCNC: 2.6 GM/DL
GLUCOSE SERPL-MCNC: 97 MG/DL (ref 65–99)
POTASSIUM SERPL-SCNC: 4.6 MMOL/L (ref 3.5–5.2)
PROT SERPL-MCNC: 7 G/DL (ref 6–8.5)
PTH-INTACT SERPL-MCNC: 60 PG/ML (ref 15–65)
SODIUM SERPL-SCNC: 142 MMOL/L (ref 136–145)

## 2021-10-19 PROCEDURE — 80053 COMPREHEN METABOLIC PANEL: CPT

## 2021-10-19 PROCEDURE — 83970 ASSAY OF PARATHORMONE: CPT

## 2021-10-19 PROCEDURE — 36415 COLL VENOUS BLD VENIPUNCTURE: CPT

## 2021-10-26 LAB
AMMONIA 24H UR-MCNC: ABNORMAL UG/DL
AMMONIA 24H UR-SRATE: 44 MEQ/24 HR
CA H2 PHOS DIHYD CRY URNS QL MICRO: 2.78 RATIO (ref 0–3)
CALCIUM 24H UR-MCNC: 19.5 MG/DL
CALCIUM 24H UR-MRATE: 234 MG/24 HR (ref 0–320)
CHLORIDE 24H UR-SCNC: 134 MMOL/L
CHLORIDE 24H UR-SRATE: 161 MMOL/24 HR (ref 38–210)
CITRATE 24H UR-MCNC: 591 MG/L
CITRATE 24H UR-MRATE: 709 MG/24 HR (ref 320–1240)
COM CRY STONE QL IR: 7 RATIO (ref 0–6)
CREAT 24H UR-MCNC: 146.9 MG/DL
CREAT 24H UR-MRATE: 1762.8 MG/24 HR (ref 800–1800)
CYSTINE 24H UR-MCNC: 12.22 MG/L
CYSTINE 24H UR-MRATE: 14.66 MG/24 HR (ref 2.1–58)
LABORATORY COMMENT REPORT: ABNORMAL
MAGNESIUM 24H UR-MRATE: 85 MG/24 HR (ref 12–293)
MAGNESIUM UR-MCNC: 7.1 MG/DL
NA URATE CRY STONE QL IR: 10.87 RATIO (ref 0–4)
OSMOLALITY UR: 629 MOSMOL/KG (ref 300–900)
OXALATE 24H UR-MRATE: 22 MG/24 HR (ref 4–31)
OXALATE UR-MCNC: 18 MG/L
PH 24H UR: 6.1 [PH] (ref 4.5–8)
PHOSPHATE 24H UR-MCNC: 63.3 MG/DL
PHOSPHATE 24H UR-MRATE: 759.6 MG/24 HR (ref 261–1078)
POTASSIUM 24H UR-SRATE: 35.5 MMOL/24 HR (ref 14–95)
POTASSIUM UR-SCNC: 29.6 MMOL/L
SODIUM 24H UR-SCNC: 148 MMOL/L
SODIUM 24H UR-SRATE: 178 MMOL/24 HR (ref 39–258)
SPECIMEN VOL 24H UR: 1200 ML/24 HR (ref 600–1600)
SPECIMEN VOL 24H UR: 1200 ML/24 HR (ref 600–1600)
SULFATE 24H UR-SCNC: 13 MEQ/L
SULFATE 24H UR-SRATE: 16 MEQ/24 HR (ref 0–30)
TRI-PHOS CRY STONE MICRO: 0.06 RATIO (ref 0–1)
URATE 24H UR-MCNC: 68.6 MG/DL
URATE 24H UR-MRATE: 823 MG/24 HR (ref 174–902)
URATE DIHYD CRY STONE QL IR: 2.3 RATIO (ref 0–1.2)

## 2021-12-09 DIAGNOSIS — F90.2 ATTENTION DEFICIT HYPERACTIVITY DISORDER (ADHD), COMBINED TYPE: ICD-10-CM

## 2021-12-09 RX ORDER — DEXTROAMPHETAMINE SACCHARATE, AMPHETAMINE ASPARTATE MONOHYDRATE, DEXTROAMPHETAMINE SULFATE AND AMPHETAMINE SULFATE 5; 5; 5; 5 MG/1; MG/1; MG/1; MG/1
20 CAPSULE, EXTENDED RELEASE ORAL EVERY MORNING
Qty: 30 CAPSULE | Refills: 0 | Status: SHIPPED | OUTPATIENT
Start: 2021-12-09 | End: 2022-01-14

## 2022-01-03 ENCOUNTER — TELEPHONE (OUTPATIENT)
Dept: FAMILY MEDICINE CLINIC | Facility: CLINIC | Age: 27
End: 2022-01-03

## 2022-01-03 NOTE — TELEPHONE ENCOUNTER
PT STATES SHE HAD A DRUG TEST PERFORMED AND TESTED POSITIVE FOR AMPHETAMINE BECAUSE OF HER ADDERALL, BUT THERE WAS NO RECORD OF HER RX PROVIDED AND HER LICENSE HAS BEEN SUSPENDED. SHE NEEDS SOMETHING FROM YOU STATING SHE HAS AN ACTIVE RX FOR HER ADDERALL

## 2022-01-04 NOTE — TELEPHONE ENCOUNTER
Patient had medication list printed and sent yesterday from our office.  She is contacting drug testing office and will let me know if any additional information is needed.  Thanks, SUZY Denise

## 2022-01-14 DIAGNOSIS — F90.2 ATTENTION DEFICIT HYPERACTIVITY DISORDER (ADHD), COMBINED TYPE: ICD-10-CM

## 2022-01-14 RX ORDER — DEXTROAMPHETAMINE SACCHARATE, AMPHETAMINE ASPARTATE MONOHYDRATE, DEXTROAMPHETAMINE SULFATE AND AMPHETAMINE SULFATE 5; 5; 5; 5 MG/1; MG/1; MG/1; MG/1
20 CAPSULE, EXTENDED RELEASE ORAL EVERY MORNING
Qty: 30 CAPSULE | Refills: 0 | Status: SHIPPED | OUTPATIENT
Start: 2022-01-14 | End: 2022-02-21

## 2022-01-28 ENCOUNTER — TELEPHONE (OUTPATIENT)
Dept: FAMILY MEDICINE CLINIC | Facility: CLINIC | Age: 27
End: 2022-01-28

## 2022-01-28 NOTE — TELEPHONE ENCOUNTER
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 She saw Dr Denise in September and had a physical. Can you fill out from that visit? Phone is [phone].

## 2022-01-28 NOTE — TELEPHONE ENCOUNTER
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 1-28-22 RR

## 2022-02-21 DIAGNOSIS — F90.2 ATTENTION DEFICIT HYPERACTIVITY DISORDER (ADHD), COMBINED TYPE: ICD-10-CM

## 2022-02-21 RX ORDER — DEXTROAMPHETAMINE SACCHARATE, AMPHETAMINE ASPARTATE MONOHYDRATE, DEXTROAMPHETAMINE SULFATE AND AMPHETAMINE SULFATE 5; 5; 5; 5 MG/1; MG/1; MG/1; MG/1
20 CAPSULE, EXTENDED RELEASE ORAL EVERY MORNING
Qty: 30 CAPSULE | Refills: 0 | Status: SHIPPED | OUTPATIENT
Start: 2022-02-21 | End: 2022-03-30

## 2022-02-27 PROCEDURE — 87081 CULTURE SCREEN ONLY: CPT | Performed by: NURSE PRACTITIONER

## 2022-02-28 ENCOUNTER — TELEPHONE (OUTPATIENT)
Dept: FAMILY MEDICINE CLINIC | Facility: CLINIC | Age: 27
End: 2022-02-28

## 2022-03-01 NOTE — TELEPHONE ENCOUNTER
Called pt she advised starting last Tuesday/Wednesday she started having Vaginal pain after intercourse, sore to touch, by Sunday night there were round circles and red raised white spots. Pt reported she had a sore throat with white spots as well. Pt has concerns for strep infection. Pt was seen in Urgent care of Sunday, was advised that she tested negative for strep and mono. Has concerns for Herpes. Pt reports that her  does get cold sores. Pt is wanting blood work done if possible? Pt does have Hx of nerve issues, she it concerned that could be a playing factor as she has been under a lot of stress.

## 2022-03-01 NOTE — TELEPHONE ENCOUNTER
Pt stated that she is ok with seeing TERESA Mcknight on the 3rd. She does want all testing possible.

## 2022-03-01 NOTE — TELEPHONE ENCOUNTER
Has appointment with TERESA Christine.  Needs inspection.  Can swab any vaginal lesions and order blood work at that visit.  If she wants to be worked into my schedule this week, ok to do so.  Thanks, SUZY Denise

## 2022-03-03 ENCOUNTER — LAB (OUTPATIENT)
Dept: LAB | Facility: HOSPITAL | Age: 27
End: 2022-03-03

## 2022-03-03 ENCOUNTER — OFFICE VISIT (OUTPATIENT)
Dept: FAMILY MEDICINE CLINIC | Facility: CLINIC | Age: 27
End: 2022-03-03

## 2022-03-03 VITALS
HEIGHT: 63 IN | DIASTOLIC BLOOD PRESSURE: 86 MMHG | SYSTOLIC BLOOD PRESSURE: 138 MMHG | RESPIRATION RATE: 22 BRPM | HEART RATE: 94 BPM | OXYGEN SATURATION: 99 % | WEIGHT: 172.3 LBS | BODY MASS INDEX: 30.53 KG/M2

## 2022-03-03 DIAGNOSIS — L98.9 SKIN LESIONS: Primary | ICD-10-CM

## 2022-03-03 DIAGNOSIS — S31.41XA: ICD-10-CM

## 2022-03-03 DIAGNOSIS — L98.9 SKIN LESIONS: ICD-10-CM

## 2022-03-03 LAB
HAV IGM SERPL QL IA: NORMAL
HBV CORE IGM SERPL QL IA: NORMAL
HBV SURFACE AG SERPL QL IA: NORMAL
HCV AB SER DONR QL: NORMAL
HIV1+2 AB SER QL: NORMAL

## 2022-03-03 PROCEDURE — 87661 TRICHOMONAS VAGINALIS AMPLIF: CPT | Performed by: NURSE PRACTITIONER

## 2022-03-03 PROCEDURE — G0432 EIA HIV-1/HIV-2 SCREEN: HCPCS

## 2022-03-03 PROCEDURE — 36415 COLL VENOUS BLD VENIPUNCTURE: CPT

## 2022-03-03 PROCEDURE — 87591 N.GONORRHOEAE DNA AMP PROB: CPT | Performed by: NURSE PRACTITIONER

## 2022-03-03 PROCEDURE — 99214 OFFICE O/P EST MOD 30 MIN: CPT | Performed by: NURSE PRACTITIONER

## 2022-03-03 PROCEDURE — 87522 HEPATITIS C REVRS TRNSCRPJ: CPT

## 2022-03-03 PROCEDURE — 87255 GENET VIRUS ISOLATE HSV: CPT | Performed by: NURSE PRACTITIONER

## 2022-03-03 PROCEDURE — 80074 ACUTE HEPATITIS PANEL: CPT

## 2022-03-03 PROCEDURE — 86592 SYPHILIS TEST NON-TREP QUAL: CPT

## 2022-03-03 PROCEDURE — 87491 CHLMYD TRACH DNA AMP PROBE: CPT | Performed by: NURSE PRACTITIONER

## 2022-03-03 NOTE — PROGRESS NOTES
Chief Complaint  Pain (pelvic area)    Subjective          Tanja Brice presents to [facility] with concerns over vaginal lesions. She  noticed lesions Tuesday/ Wednesday.  Had sex Monday with , he used his fingers as well, and she thought she may have had a lesion from that, she felt like a scab/cut at first, and then sores appeared Friday/ Saturday morning. Started going back to tanning bed two weeks ago, doesn't wear anything in the tanning bed. Patient reports that her  gets cold sores, doesn't recall last time he had one. Low grade fever 99, had took Tylenol. No vaginal odor or discharge. Reports increase stress. Shr request STI workup.        Pain  This is a new problem. The current episode started in the past 7 days. The problem occurs constantly. The problem has been unchanged. Pertinent negatives include no arthralgias, chest pain, chills, congestion, coughing, myalgias, nausea, sore throat or weakness. Treatments tried: valtrex. The treatment provided mild relief.     Outpatient Medications Prior to Visit   Medication Sig Dispense Refill   • amphetamine-dextroamphetamine XR (ADDERALL XR) 20 MG 24 hr capsule TAKE 1 CAPSULE BY MOUTH EVERY MORNING 30 capsule 0   • CRANBERRY EXTRACT PO Take  by mouth 2 (two) times a day.     • ferrous sulfate 324 (65 Fe) MG tablet delayed-release EC tablet Take 324 mg by mouth Daily With Breakfast.     • valACYclovir (VALTREX) 1000 MG tablet Take 1 tablet by mouth 2 (Two) Times a Day for 10 days. May refill with 500 mg BID for 3 days 20 tablet 5     No facility-administered medications prior to visit.       Review of Systems   Constitutional: Negative for activity change, appetite change and chills.   HENT: Negative for congestion, ear pain, sore throat and trouble swallowing.    Eyes: Negative for discharge, itching and visual disturbance.   Respiratory: Negative for apnea, cough and wheezing.   "  Cardiovascular: Negative for chest pain and leg swelling.   Gastrointestinal: Negative for abdominal distention, constipation, diarrhea and nausea.   Endocrine: Negative for cold intolerance, heat intolerance and polyuria.   Genitourinary: Positive for vaginal pain. Negative for dysuria, frequency and urgency.   Musculoskeletal: Negative for arthralgias, back pain and myalgias.   Skin: Negative for color change, pallor and wound.   Neurological: Negative for dizziness, seizures, syncope, weakness and light-headedness.   Psychiatric/Behavioral: Negative for agitation, confusion and sleep disturbance. The patient is not nervous/anxious.          Objective   Vital Signs:   Visit Vitals  /86 (BP Location: Right arm, Patient Position: Sitting, Cuff Size: Adult)   Pulse 94   Resp 22   Ht 160 cm (63\")   Wt 78.2 kg (172 lb 4.8 oz)   LMP 02/24/2022   SpO2 99%   BMI 30.52 kg/m²     Physical Exam  Vitals and nursing note reviewed.   Constitutional:       Appearance: She is well-developed.   HENT:      Head: Normocephalic and atraumatic.   Eyes:      General: Lids are normal.      Conjunctiva/sclera: Conjunctivae normal.   Neck:      Thyroid: No thyroid mass or thyromegaly.      Trachea: Trachea normal. No tracheal tenderness.   Cardiovascular:      Rate and Rhythm: Normal rate.      Pulses: Normal pulses.      Heart sounds: Normal heart sounds.   Pulmonary:      Effort: Pulmonary effort is normal. No respiratory distress.      Breath sounds: Normal breath sounds. No wheezing.   Abdominal:      General: There is no distension.      Palpations: Abdomen is soft. There is no mass.   Genitourinary:     Labia:         Right: Tenderness and lesion present.         Left: Tenderness and lesion present.        Musculoskeletal:         General: Normal range of motion.      Cervical back: Normal range of motion. No edema.   Lymphadenopathy:      Head:      Right side of head: No submental, submandibular or tonsillar adenopathy.    "   Left side of head: No submental, submandibular or tonsillar adenopathy.   Skin:     General: Skin is warm and dry.      Coloration: Skin is not pale.      Findings: No abrasion, erythema or lesion.   Neurological:      Mental Status: She is alert and oriented to person, place, and time.   Psychiatric:         Mood and Affect: Mood normal. Mood is not anxious. Affect is not inappropriate.         Speech: Speech normal.         Behavior: Behavior normal.         Thought Content: Thought content normal.         Judgment: Judgment normal. Judgment is not impulsive.        Result Review :                 Assessment and Plan    Diagnoses and all orders for this visit:    1. Skin lesions (Primary)  -     Gardnerella vaginalis, Trichomonas vaginalis, Candida albicans, DNA - Swab, Vagina; Future  -     Cancel: Chlamydia trachomatis, Neisseria gonorrhoeae, PCR - Urine, Urine, Clean Catch  -     Herpes Simplex Virus Culture - Swab, Vulva; Future  -     Gardnerella vaginalis, Trichomonas vaginalis, Candida albicans, DNA - Swab, Vagina  -     Herpes Simplex Virus Culture - Swab, Vulva  -     Hepatitis C RNA, quantitative, PCR (graph); Future  -     Hepatitis panel, acute; Future  -     HIV-1/O/2 ANTIGEN/ANTIBODY, 4TH GENERATION; Future  -     RPR; Future  -     Chlamydia trachomatis, Neisseria gonorrhoeae, PCR - Urine, Urine, Clean Catch    2. Laceration of vagina, unspecified whether obstetric, initial encounter  -     Gardnerella vaginalis, Trichomonas vaginalis, Candida albicans, DNA - Swab, Vagina; Future  -     Cancel: Chlamydia trachomatis, Neisseria gonorrhoeae, PCR - Urine, Urine, Clean Catch  -     Herpes Simplex Virus Culture - Swab, Vulva; Future  -     Gardnerella vaginalis, Trichomonas vaginalis, Candida albicans, DNA - Swab, Vagina  -     Herpes Simplex Virus Culture - Swab, Vulva  -     Hepatitis C RNA, quantitative, PCR (graph); Future  -     Hepatitis panel, acute; Future  -     HIV-1/O/2 ANTIGEN/ANTIBODY, 4TH  GENERATION; Future  -     RPR; Future  -     Chlamydia trachomatis, Neisseria gonorrhoeae, PCR - Urine, Urine, Clean Catch      Continue Valtrex     We will call as soon as results are back       I spent 30 minutes caring for Tanja on this date of service. This time includes time spent by me in the following activities:preparing for the visit, performing a medically appropriate examination and/or evaluation , counseling and educating the patient/family/caregiver, ordering medications, tests, or procedures and documenting information in the medical record  Follow Up   Return if symptoms worsen or fail to improve, for Recheck, Next scheduled follow up.  Patient was given instructions and counseling regarding her condition or for health maintenance advice. Please see specific information pulled into the AVS if appropriate.           This document has been electronically signed by TERESA Christine on March 3, 2022 11:52 CST

## 2022-03-04 LAB
C TRACH RRNA CVX QL NAA+PROBE: NEGATIVE
N GONORRHOEA RRNA SPEC QL NAA+PROBE: NEGATIVE
RPR SER QL: NORMAL
TRICHOMONAS VAGINALIS PCR: NEGATIVE

## 2022-03-06 LAB — HSV SPEC CULT: NEGATIVE

## 2022-03-07 ENCOUNTER — TELEPHONE (OUTPATIENT)
Dept: FAMILY MEDICINE CLINIC | Facility: CLINIC | Age: 27
End: 2022-03-07

## 2022-03-07 LAB
HCV RNA SERPL NAA+PROBE-ACNC: NORMAL IU/ML
TEST INFORMATION: NORMAL

## 2022-03-30 DIAGNOSIS — F90.2 ATTENTION DEFICIT HYPERACTIVITY DISORDER (ADHD), COMBINED TYPE: ICD-10-CM

## 2022-03-30 RX ORDER — DEXTROAMPHETAMINE SULFATE, DEXTROAMPHETAMINE SACCHARATE, AMPHETAMINE SULFATE AND AMPHETAMINE ASPARTATE 5; 5; 5; 5 MG/1; MG/1; MG/1; MG/1
CAPSULE, EXTENDED RELEASE ORAL
Qty: 30 CAPSULE | Refills: 0 | Status: SHIPPED | OUTPATIENT
Start: 2022-03-30 | End: 2022-05-08 | Stop reason: SDUPTHER

## 2022-04-14 ENCOUNTER — APPOINTMENT (OUTPATIENT)
Dept: ONCOLOGY | Facility: HOSPITAL | Age: 27
End: 2022-04-14

## 2022-04-14 ENCOUNTER — OFFICE VISIT (OUTPATIENT)
Dept: ONCOLOGY | Facility: CLINIC | Age: 27
End: 2022-04-14

## 2022-04-14 VITALS
HEART RATE: 78 BPM | RESPIRATION RATE: 18 BRPM | WEIGHT: 172 LBS | BODY MASS INDEX: 30.47 KG/M2 | TEMPERATURE: 96.4 F | SYSTOLIC BLOOD PRESSURE: 128 MMHG | OXYGEN SATURATION: 100 % | DIASTOLIC BLOOD PRESSURE: 80 MMHG

## 2022-04-14 DIAGNOSIS — D68.51 FACTOR 5 LEIDEN MUTATION, HETEROZYGOUS: ICD-10-CM

## 2022-04-14 PROCEDURE — G0463 HOSPITAL OUTPT CLINIC VISIT: HCPCS | Performed by: INTERNAL MEDICINE

## 2022-04-14 PROCEDURE — 99212 OFFICE O/P EST SF 10 MIN: CPT | Performed by: INTERNAL MEDICINE

## 2022-04-14 NOTE — PROGRESS NOTES
Chief Complaint  Follow-up- Factor V leiden mutation     Subjective          Tanja Brice presents to [facility]  History of Present Illness     No new health issues since last visit.   No new medications.   No new hospitalization.   Feels well.       Objective   Vital Signs:   /80   Pulse 78   Temp 96.4 °F (35.8 °C)   Resp 18   Wt 78 kg (172 lb)   SpO2 100%   BMI 30.47 kg/m²            Physical Exam  Vitals and nursing note reviewed.   Constitutional:       Appearance: Normal appearance.   Neurological:      Mental Status: She is alert.   Psychiatric:         Mood and Affect: Mood normal.         Behavior: Behavior normal.         Thought Content: Thought content normal.        Result Review :                Tanja Brice reports a pain score of 0.  Given her pain assessment as noted, treatment options were discussed and the following options were decided upon as a follow-up plan to address the patient's pain: continuation of current treatment plan for pain.    Patient screened positive for depression based on a PHQ-9 score of 0 on 4/14/2022. Follow-up recommendations include: Suicide Risk Assessment performed.      Assessment and Plan    Diagnoses and all orders for this visit:    1. Factor 5 Leiden mutation, heterozygous (HCC)    Chronic, stable.  No concern for thrombosis.  She has lost some weight since last year.  Encouraged her to continue with physical activity and healthy diet.  Recommend prophylactic anticoagulation in high risk situations such as medical or surgical illness, prolonged sedentary status.  Avoid hormone containing contraceptive pills or supplements.  Recheck in a year.      Follow Up   No follow-ups on file.  Patient was given instructions and counseling regarding her condition or for health maintenance advice. Please see specific information pulled into the AVS if appropriate.

## 2022-04-15 ENCOUNTER — OFFICE VISIT (OUTPATIENT)
Dept: FAMILY MEDICINE CLINIC | Facility: CLINIC | Age: 27
End: 2022-04-15

## 2022-04-15 VITALS
BODY MASS INDEX: 31.01 KG/M2 | HEART RATE: 93 BPM | HEIGHT: 63 IN | WEIGHT: 175 LBS | OXYGEN SATURATION: 100 % | SYSTOLIC BLOOD PRESSURE: 110 MMHG | DIASTOLIC BLOOD PRESSURE: 80 MMHG

## 2022-04-15 DIAGNOSIS — F90.2 ATTENTION DEFICIT HYPERACTIVITY DISORDER (ADHD), COMBINED TYPE: Primary | ICD-10-CM

## 2022-04-15 PROCEDURE — 99213 OFFICE O/P EST LOW 20 MIN: CPT | Performed by: FAMILY MEDICINE

## 2022-05-08 RX ORDER — DEXTROAMPHETAMINE SULFATE, DEXTROAMPHETAMINE SACCHARATE, AMPHETAMINE SULFATE AND AMPHETAMINE ASPARTATE 5; 5; 5; 5 MG/1; MG/1; MG/1; MG/1
20 CAPSULE, EXTENDED RELEASE ORAL EVERY MORNING
Qty: 30 CAPSULE | Refills: 0 | Status: SHIPPED | OUTPATIENT
Start: 2022-05-08 | End: 2022-06-14

## 2022-06-14 DIAGNOSIS — F90.2 ATTENTION DEFICIT HYPERACTIVITY DISORDER (ADHD), COMBINED TYPE: ICD-10-CM

## 2022-06-14 RX ORDER — DEXTROAMPHETAMINE SACCHARATE, AMPHETAMINE ASPARTATE MONOHYDRATE, DEXTROAMPHETAMINE SULFATE AND AMPHETAMINE SULFATE 5; 5; 5; 5 MG/1; MG/1; MG/1; MG/1
CAPSULE, EXTENDED RELEASE ORAL
Qty: 30 CAPSULE | Refills: 0 | Status: SHIPPED | OUTPATIENT
Start: 2022-06-14 | End: 2022-07-15

## 2022-07-06 ENCOUNTER — APPOINTMENT (OUTPATIENT)
Dept: GENERAL RADIOLOGY | Facility: HOSPITAL | Age: 27
End: 2022-07-06

## 2022-07-06 ENCOUNTER — APPOINTMENT (OUTPATIENT)
Dept: CT IMAGING | Facility: HOSPITAL | Age: 27
End: 2022-07-06

## 2022-07-06 ENCOUNTER — HOSPITAL ENCOUNTER (EMERGENCY)
Facility: HOSPITAL | Age: 27
Discharge: HOME | End: 2022-07-06
Attending: STUDENT IN AN ORGANIZED HEALTH CARE EDUCATION/TRAINING PROGRAM | Admitting: EMERGENCY MEDICINE

## 2022-07-06 VITALS
TEMPERATURE: 98.7 F | BODY MASS INDEX: 30.37 KG/M2 | SYSTOLIC BLOOD PRESSURE: 114 MMHG | RESPIRATION RATE: 18 BRPM | DIASTOLIC BLOOD PRESSURE: 70 MMHG | WEIGHT: 171.4 LBS | HEART RATE: 64 BPM | HEIGHT: 63 IN | OXYGEN SATURATION: 100 %
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
## 2022-07-06 DIAGNOSIS — S39.012A BACK STRAIN, INITIAL ENCOUNTER: ICD-10-CM

## 2022-07-06 DIAGNOSIS — S30.1XXA CONTUSION OF ABDOMINAL WALL, INITIAL ENCOUNTER: ICD-10-CM

## 2022-07-06 LAB
ANION GAP SERPL CALCULATED.3IONS-SCNC: 9 MMOL/L (ref 5–15)
BASOPHILS # BLD AUTO: 0.04 10*3/MM3 (ref 0–0.2)
BASOPHILS NFR BLD AUTO: 0.4 % (ref 0–1.5)
BILIRUB UR QL STRIP: NEGATIVE
BUN SERPL-MCNC: 9 MG/DL (ref 6–20)
BUN/CREAT SERPL: 10.8 (ref 7–25)
CALCIUM SPEC-SCNC: 9 MG/DL (ref 8.6–10.5)
CHLORIDE SERPL-SCNC: 104 MMOL/L (ref 98–107)
CLARITY UR: CLEAR
CO2 SERPL-SCNC: 25 MMOL/L (ref 22–29)
COLOR UR: YELLOW
CREAT SERPL-MCNC: 0.83 MG/DL (ref 0.57–1)
DEPRECATED RDW RBC AUTO: 36.9 FL (ref 37–54)
EGFRCR SERPLBLD CKD-EPI 2021: 98 ML/MIN/1.73
EOSINOPHIL # BLD AUTO: 0 10*3/MM3 (ref 0–0.4)
EOSINOPHIL NFR BLD AUTO: 0 % (ref 0.3–6.2)
ERYTHROCYTE [DISTWIDTH] IN BLOOD BY AUTOMATED COUNT: 12.2 % (ref 12.3–15.4)
GLUCOSE SERPL-MCNC: 101 MG/DL (ref 65–99)
GLUCOSE UR STRIP-MCNC: NEGATIVE MG/DL
HCG SERPL QL: NEGATIVE
HCT VFR BLD AUTO: 38.4 % (ref 34–46.6)
HGB BLD-MCNC: 13.4 G/DL (ref 12–15.9)
HGB UR QL STRIP.AUTO: NEGATIVE
IMM GRANULOCYTES # BLD AUTO: 0.07 10*3/MM3 (ref 0–0.05)
IMM GRANULOCYTES NFR BLD AUTO: 0.7 % (ref 0–0.5)
KETONES UR QL STRIP: NEGATIVE
LEUKOCYTE ESTERASE UR QL STRIP.AUTO: NEGATIVE
LIPASE SERPL-CCNC: 20 U/L (ref 13–60)
LYMPHOCYTES # BLD AUTO: 1.64 10*3/MM3 (ref 0.7–3.1)
LYMPHOCYTES NFR BLD AUTO: 15.7 % (ref 19.6–45.3)
MCH RBC QN AUTO: 29.2 PG (ref 26.6–33)
MCHC RBC AUTO-ENTMCNC: 34.9 G/DL (ref 31.5–35.7)
MCV RBC AUTO: 83.7 FL (ref 79–97)
MONOCYTES # BLD AUTO: 0.55 10*3/MM3 (ref 0.1–0.9)
MONOCYTES NFR BLD AUTO: 5.3 % (ref 5–12)
NEUTROPHILS NFR BLD AUTO: 77.9 % (ref 42.7–76)
NEUTROPHILS NFR BLD AUTO: 8.16 10*3/MM3 (ref 1.7–7)
NITRITE UR QL STRIP: NEGATIVE
NRBC BLD AUTO-RTO: 0 /100 WBC (ref 0–0.2)
PH UR STRIP.AUTO: 5.5 [PH] (ref 5–9)
PLATELET # BLD AUTO: 307 10*3/MM3 (ref 140–450)
PMV BLD AUTO: 9.4 FL (ref 6–12)
POTASSIUM SERPL-SCNC: 4 MMOL/L (ref 3.5–5.2)
PROT UR QL STRIP: NEGATIVE
RBC # BLD AUTO: 4.59 10*6/MM3 (ref 3.77–5.28)
SODIUM SERPL-SCNC: 138 MMOL/L (ref 136–145)
SP GR UR STRIP: 1.05 (ref 1–1.03)
TROPONIN T SERPL-MCNC: <0.01 NG/ML (ref 0–0.03)
UROBILINOGEN UR QL STRIP: ABNORMAL
WBC NRBC COR # BLD: 10.46 10*3/MM3 (ref 3.4–10.8)
WHOLE BLOOD HOLD COAG: NORMAL

## 2022-07-06 PROCEDURE — 96374 THER/PROPH/DIAG INJ IV PUSH: CPT

## 2022-07-06 PROCEDURE — 99284 EMERGENCY DEPT VISIT MOD MDM: CPT

## 2022-07-06 PROCEDURE — 96376 TX/PRO/DX INJ SAME DRUG ADON: CPT

## 2022-07-06 PROCEDURE — 72128 CT CHEST SPINE W/O DYE: CPT

## 2022-07-06 PROCEDURE — 84703 CHORIONIC GONADOTROPIN ASSAY: CPT | Performed by: STUDENT IN AN ORGANIZED HEALTH CARE EDUCATION/TRAINING PROGRAM

## 2022-07-06 PROCEDURE — 84484 ASSAY OF TROPONIN QUANT: CPT | Performed by: EMERGENCY MEDICINE

## 2022-07-06 PROCEDURE — 71260 CT THORAX DX C+: CPT

## 2022-07-06 PROCEDURE — 83690 ASSAY OF LIPASE: CPT | Performed by: EMERGENCY MEDICINE

## 2022-07-06 PROCEDURE — 73030 X-RAY EXAM OF SHOULDER: CPT

## 2022-07-06 PROCEDURE — 70450 CT HEAD/BRAIN W/O DYE: CPT

## 2022-07-06 PROCEDURE — 72125 CT NECK SPINE W/O DYE: CPT

## 2022-07-06 PROCEDURE — 85025 COMPLETE CBC W/AUTO DIFF WBC: CPT | Performed by: STUDENT IN AN ORGANIZED HEALTH CARE EDUCATION/TRAINING PROGRAM

## 2022-07-06 PROCEDURE — 74177 CT ABD & PELVIS W/CONTRAST: CPT

## 2022-07-06 PROCEDURE — 25010000002 IOPAMIDOL 61 % SOLUTION: Performed by: STUDENT IN AN ORGANIZED HEALTH CARE EDUCATION/TRAINING PROGRAM

## 2022-07-06 PROCEDURE — P9612 CATHETERIZE FOR URINE SPEC: HCPCS

## 2022-07-06 PROCEDURE — 25010000002 MORPHINE PER 10 MG: Performed by: STUDENT IN AN ORGANIZED HEALTH CARE EDUCATION/TRAINING PROGRAM

## 2022-07-06 PROCEDURE — 71250 CT THORAX DX C-: CPT

## 2022-07-06 PROCEDURE — 25010000002 MORPHINE PER 10 MG: Performed by: EMERGENCY MEDICINE

## 2022-07-06 PROCEDURE — 96375 TX/PRO/DX INJ NEW DRUG ADDON: CPT

## 2022-07-06 PROCEDURE — 80048 BASIC METABOLIC PNL TOTAL CA: CPT | Performed by: STUDENT IN AN ORGANIZED HEALTH CARE EDUCATION/TRAINING PROGRAM

## 2022-07-06 PROCEDURE — 81003 URINALYSIS AUTO W/O SCOPE: CPT | Performed by: EMERGENCY MEDICINE

## 2022-07-06 PROCEDURE — 25010000002 ONDANSETRON PER 1 MG: Performed by: EMERGENCY MEDICINE

## 2022-07-06 PROCEDURE — 73564 X-RAY EXAM KNEE 4 OR MORE: CPT

## 2022-07-06 RX ORDER — ONDANSETRON 2 MG/ML
4 INJECTION INTRAMUSCULAR; INTRAVENOUS ONCE
Status: COMPLETED | OUTPATIENT
Start: 2022-07-06 | End: 2022-07-06

## 2022-07-06 RX ADMIN — MORPHINE SULFATE 4 MG: 4 INJECTION INTRAVENOUS at 20:15

## 2022-07-06 RX ADMIN — IOPAMIDOL 90 ML: 612 INJECTION, SOLUTION INTRAVENOUS at 19:31

## 2022-07-06 RX ADMIN — SODIUM CHLORIDE 1000 ML: 9 INJECTION, SOLUTION INTRAVENOUS at 20:05

## 2022-07-06 RX ADMIN — ONDANSETRON 4 MG: 2 INJECTION INTRAMUSCULAR; INTRAVENOUS at 20:14

## 2022-07-06 RX ADMIN — MORPHINE SULFATE 4 MG: 4 INJECTION INTRAVENOUS at 17:37
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
## 2022-07-07 NOTE — DISCHARGE INSTRUCTIONS
Take Tylenol/ibuprofen as needed for pain.  Follow-up with primary care for reevaluation.  Follow head injury instructions.  Return to ER for any worsening.

## 2022-07-15 ENCOUNTER — OFFICE VISIT (OUTPATIENT)
Dept: FAMILY MEDICINE CLINIC | Facility: CLINIC | Age: 27
End: 2022-07-15

## 2022-07-15 VITALS
OXYGEN SATURATION: 100 % | BODY MASS INDEX: 29.77 KG/M2 | HEART RATE: 80 BPM | DIASTOLIC BLOOD PRESSURE: 82 MMHG | HEIGHT: 63 IN | SYSTOLIC BLOOD PRESSURE: 114 MMHG | WEIGHT: 168 LBS

## 2022-07-15 DIAGNOSIS — F90.2 ATTENTION DEFICIT HYPERACTIVITY DISORDER (ADHD), COMBINED TYPE: Primary | ICD-10-CM

## 2022-07-15 PROCEDURE — 99213 OFFICE O/P EST LOW 20 MIN: CPT | Performed by: FAMILY MEDICINE

## 2022-07-15 RX ORDER — DEXTROAMPHETAMINE SACCHARATE, AMPHETAMINE ASPARTATE MONOHYDRATE, DEXTROAMPHETAMINE SULFATE AND AMPHETAMINE SULFATE 6.25; 6.25; 6.25; 6.25 MG/1; MG/1; MG/1; MG/1
25 CAPSULE, EXTENDED RELEASE ORAL EVERY MORNING
Qty: 30 CAPSULE | Refills: 0 | Status: CANCELLED | OUTPATIENT
Start: 2022-07-15

## 2022-07-18 ENCOUNTER — TELEPHONE (OUTPATIENT)
Dept: FAMILY MEDICINE CLINIC | Facility: CLINIC | Age: 27
End: 2022-07-18

## 2022-07-18 DIAGNOSIS — F90.2 ATTENTION DEFICIT HYPERACTIVITY DISORDER (ADHD), COMBINED TYPE: Primary | ICD-10-CM

## 2022-07-18 RX ORDER — DEXTROAMPHETAMINE SACCHARATE, AMPHETAMINE ASPARTATE MONOHYDRATE, DEXTROAMPHETAMINE SULFATE AND AMPHETAMINE SULFATE 6.25; 6.25; 6.25; 6.25 MG/1; MG/1; MG/1; MG/1
25 CAPSULE, EXTENDED RELEASE ORAL EVERY MORNING
Qty: 30 CAPSULE | Refills: 0 | Status: SHIPPED | OUTPATIENT
Start: 2022-07-18 | End: 2022-08-17

## 2022-07-18 NOTE — TELEPHONE ENCOUNTER
Pt is needing script increase from 20 mg to 25 mg Incoming Refill Request      Medication requested (name and dose): adderal XR  25 mg    Pharmacy where request should be sent: [facility]     Additional details provided by patient:  said would increase as needed took last pill today     Best call back number: [phone]    Does the patient have less than a 3 day supply:  [x] Yes  [] No    Antonio Cárdenas Rep  07/18/22, 13:13 CDT

## 2022-07-21 ENCOUNTER — TELEPHONE (OUTPATIENT)
Dept: FAMILY MEDICINE CLINIC | Facility: CLINIC | Age: 27
End: 2022-07-21

## 2022-07-21 NOTE — TELEPHONE ENCOUNTER
Letter written and sent to you. [facility]. Thank you.

## 2022-07-21 NOTE — TELEPHONE ENCOUNTER
PT NEEDS A LETTER SENT TO [facility] STATING SHE IS ON AMPHETAMINES FOLLOWING A DRUG TEST. STATES IT HAS TO BE RECEIVED BY [facility] IN 48 HOURS
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
## 2022-08-01 ENCOUNTER — TELEPHONE (OUTPATIENT)
Dept: FAMILY MEDICINE CLINIC | Facility: CLINIC | Age: 27
End: 2022-08-01
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
## 2022-08-03 ENCOUNTER — APPOINTMENT (OUTPATIENT)
Dept: CT IMAGING | Facility: HOSPITAL | Age: 27
End: 2022-08-03

## 2022-08-03 ENCOUNTER — HOSPITAL ENCOUNTER (EMERGENCY)
Facility: HOSPITAL | Age: 27
Discharge: HOME | End: 2022-08-03
Attending: EMERGENCY MEDICINE

## 2022-08-03 VITALS
WEIGHT: 164.6 LBS | HEART RATE: 86 BPM | RESPIRATION RATE: 18 BRPM | BODY MASS INDEX: 29.16 KG/M2 | OXYGEN SATURATION: 100 % | TEMPERATURE: 98.3 F | HEIGHT: 63 IN | SYSTOLIC BLOOD PRESSURE: 128 MMHG | DIASTOLIC BLOOD PRESSURE: 88 MMHG

## 2022-08-03 DIAGNOSIS — K52.9 COLITIS, ACUTE: Primary | ICD-10-CM

## 2022-08-03 LAB
ALBUMIN SERPL-MCNC: 4.3 G/DL (ref 3.5–5.2)
ALBUMIN/GLOB SERPL: 1.4 G/DL
ALP SERPL-CCNC: 61 U/L (ref 39–117)
ALT SERPL W P-5'-P-CCNC: 14 U/L (ref 1–33)
ANION GAP SERPL CALCULATED.3IONS-SCNC: 8 MMOL/L (ref 5–15)
AST SERPL-CCNC: 15 U/L (ref 1–32)
BASOPHILS # BLD AUTO: 0.02 10*3/MM3 (ref 0–0.2)
BASOPHILS NFR BLD AUTO: 0.3 % (ref 0–1.5)
BILIRUB SERPL-MCNC: 0.4 MG/DL (ref 0–1.2)
BILIRUB UR QL STRIP: NEGATIVE
BUN SERPL-MCNC: 6 MG/DL (ref 6–20)
BUN/CREAT SERPL: 8.1 (ref 7–25)
CALCIUM SPEC-SCNC: 9.4 MG/DL (ref 8.6–10.5)
CHLORIDE SERPL-SCNC: 104 MMOL/L (ref 98–107)
CLARITY UR: CLEAR
CO2 SERPL-SCNC: 28 MMOL/L (ref 22–29)
COLOR UR: YELLOW
CREAT SERPL-MCNC: 0.74 MG/DL (ref 0.57–1)
DEPRECATED RDW RBC AUTO: 38.4 FL (ref 37–54)
EGFRCR SERPLBLD CKD-EPI 2021: 112.5 ML/MIN/1.73
EOSINOPHIL # BLD AUTO: 0 10*3/MM3 (ref 0–0.4)
EOSINOPHIL NFR BLD AUTO: 0 % (ref 0.3–6.2)
ERYTHROCYTE [DISTWIDTH] IN BLOOD BY AUTOMATED COUNT: 12.4 % (ref 12.3–15.4)
FLUAV SUBTYP SPEC NAA+PROBE: NOT DETECTED
FLUBV RNA ISLT QL NAA+PROBE: NOT DETECTED
GLOBULIN UR ELPH-MCNC: 3 GM/DL
GLUCOSE SERPL-MCNC: 117 MG/DL (ref 65–99)
GLUCOSE UR STRIP-MCNC: NEGATIVE MG/DL
HCG SERPL QL: NEGATIVE
HCT VFR BLD AUTO: 38 % (ref 34–46.6)
HGB BLD-MCNC: 13.5 G/DL (ref 12–15.9)
HGB UR QL STRIP.AUTO: NEGATIVE
IMM GRANULOCYTES # BLD AUTO: 0.02 10*3/MM3 (ref 0–0.05)
IMM GRANULOCYTES NFR BLD AUTO: 0.3 % (ref 0–0.5)
KETONES UR QL STRIP: NEGATIVE
LEUKOCYTE ESTERASE UR QL STRIP.AUTO: NEGATIVE
LIPASE SERPL-CCNC: 21 U/L (ref 13–60)
LYMPHOCYTES # BLD AUTO: 1.87 10*3/MM3 (ref 0.7–3.1)
LYMPHOCYTES NFR BLD AUTO: 31.6 % (ref 19.6–45.3)
MAGNESIUM SERPL-MCNC: 2.1 MG/DL (ref 1.6–2.6)
MCH RBC QN AUTO: 30.3 PG (ref 26.6–33)
MCHC RBC AUTO-ENTMCNC: 35.5 G/DL (ref 31.5–35.7)
MCV RBC AUTO: 85.2 FL (ref 79–97)
MONOCYTES # BLD AUTO: 0.4 10*3/MM3 (ref 0.1–0.9)
MONOCYTES NFR BLD AUTO: 6.8 % (ref 5–12)
NEUTROPHILS NFR BLD AUTO: 3.6 10*3/MM3 (ref 1.7–7)
NEUTROPHILS NFR BLD AUTO: 61 % (ref 42.7–76)
NITRITE UR QL STRIP: NEGATIVE
NRBC BLD AUTO-RTO: 0 /100 WBC (ref 0–0.2)
PH UR STRIP.AUTO: 7.5 [PH] (ref 5–9)
PLATELET # BLD AUTO: 251 10*3/MM3 (ref 140–450)
PMV BLD AUTO: 10.1 FL (ref 6–12)
POTASSIUM SERPL-SCNC: 3.6 MMOL/L (ref 3.5–5.2)
PROT SERPL-MCNC: 7.3 G/DL (ref 6–8.5)
PROT UR QL STRIP: NEGATIVE
RBC # BLD AUTO: 4.46 10*6/MM3 (ref 3.77–5.28)
SARS-COV-2 RNA PNL SPEC NAA+PROBE: NOT DETECTED
SODIUM SERPL-SCNC: 140 MMOL/L (ref 136–145)
SP GR UR STRIP: 1.01 (ref 1–1.03)
UROBILINOGEN UR QL STRIP: NORMAL
WBC NRBC COR # BLD: 5.91 10*3/MM3 (ref 3.4–10.8)
WHOLE BLOOD HOLD COAG: NORMAL

## 2022-08-03 PROCEDURE — 85025 COMPLETE CBC W/AUTO DIFF WBC: CPT

## 2022-08-03 PROCEDURE — 83690 ASSAY OF LIPASE: CPT

## 2022-08-03 PROCEDURE — 84703 CHORIONIC GONADOTROPIN ASSAY: CPT

## 2022-08-03 PROCEDURE — 93005 ELECTROCARDIOGRAM TRACING: CPT | Performed by: EMERGENCY MEDICINE

## 2022-08-03 PROCEDURE — 74177 CT ABD & PELVIS W/CONTRAST: CPT

## 2022-08-03 PROCEDURE — 99283 EMERGENCY DEPT VISIT LOW MDM: CPT

## 2022-08-03 PROCEDURE — 87636 SARSCOV2 & INF A&B AMP PRB: CPT

## 2022-08-03 PROCEDURE — 81003 URINALYSIS AUTO W/O SCOPE: CPT

## 2022-08-03 PROCEDURE — 25010000002 IOPAMIDOL 61 % SOLUTION: Performed by: EMERGENCY MEDICINE

## 2022-08-03 PROCEDURE — 36415 COLL VENOUS BLD VENIPUNCTURE: CPT

## 2022-08-03 PROCEDURE — 93010 ELECTROCARDIOGRAM REPORT: CPT | Performed by: INTERNAL MEDICINE

## 2022-08-03 PROCEDURE — 80053 COMPREHEN METABOLIC PANEL: CPT

## 2022-08-03 PROCEDURE — 83735 ASSAY OF MAGNESIUM: CPT

## 2022-08-03 RX ORDER — AMOXICILLIN AND CLAVULANATE POTASSIUM 875; 125 MG/1; MG/1
1 TABLET, FILM COATED ORAL ONCE
Status: COMPLETED | OUTPATIENT
Start: 2022-08-03 | End: 2022-08-03

## 2022-08-03 RX ORDER — AMOXICILLIN AND CLAVULANATE POTASSIUM 875; 125 MG/1; MG/1
1 TABLET, FILM COATED ORAL 2 TIMES DAILY
Qty: 20 TABLET | Refills: 0 | Status: SHIPPED | OUTPATIENT
Start: 2022-08-03 | End: 2022-08-13

## 2022-08-03 RX ADMIN — IOPAMIDOL 90 ML: 612 INJECTION, SOLUTION INTRAVENOUS at 17:39

## 2022-08-03 RX ADMIN — AMOXICILLIN AND CLAVULANATE POTASSIUM 1 TABLET: 875; 125 TABLET, FILM COATED ORAL at 19:28
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
## 2022-08-03 NOTE — ED NOTES
"Patient's  called and asked why it was taking so long to treat and evaluate patient.  This RN advised that a large part of patient's time in facility was due to being in waiting room before a room became available.  Patient's  cursed at this RN stating \"It's damn ridiculous, I've been in and out for same day surgery faster than her ED visit today\".  This RN attempted to explain that surgeries are scheduled, ED visits are based on acuity and cannot be scheduled.  Patient's  again cursed this RN and hung up phone.   "

## 2022-08-03 NOTE — DISCHARGE INSTRUCTIONS
Home to rest. Drink plenty of fluids. [place] diet until symptoms resolve. Call Dr. Olivier tomorrow for follow up. Return for worsening symptoms.

## 2022-08-11 ENCOUNTER — OFFICE VISIT (OUTPATIENT)
Dept: GASTROENTEROLOGY | Facility: CLINIC | Age: 27
End: 2022-08-11

## 2022-08-11 VITALS
HEART RATE: 99 BPM | SYSTOLIC BLOOD PRESSURE: 130 MMHG | HEIGHT: 63 IN | BODY MASS INDEX: 28.56 KG/M2 | WEIGHT: 161.2 LBS | DIASTOLIC BLOOD PRESSURE: 76 MMHG

## 2022-08-11 DIAGNOSIS — R10.84 GENERALIZED ABDOMINAL PAIN: Primary | ICD-10-CM

## 2022-08-11 DIAGNOSIS — K21.9 GASTROESOPHAGEAL REFLUX DISEASE, UNSPECIFIED WHETHER ESOPHAGITIS PRESENT: ICD-10-CM

## 2022-08-11 DIAGNOSIS — R63.4 WEIGHT LOSS: ICD-10-CM

## 2022-08-11 DIAGNOSIS — R16.0 HEPATOMEGALY: ICD-10-CM

## 2022-08-11 DIAGNOSIS — N83.201 CYSTS OF BOTH OVARIES: ICD-10-CM

## 2022-08-11 DIAGNOSIS — N83.202 CYSTS OF BOTH OVARIES: ICD-10-CM

## 2022-08-11 DIAGNOSIS — R11.0 NAUSEA: ICD-10-CM

## 2022-08-11 DIAGNOSIS — R19.7 DIARRHEA, UNSPECIFIED TYPE: ICD-10-CM

## 2022-08-11 PROCEDURE — 99204 OFFICE O/P NEW MOD 45 MIN: CPT | Performed by: PHYSICIAN ASSISTANT

## 2022-08-11 RX ORDER — SODIUM, POTASSIUM,MAG SULFATES 17.5-3.13G
SOLUTION, RECONSTITUTED, ORAL ORAL
Qty: 354 ML | Refills: 0 | Status: SHIPPED | OUTPATIENT
Start: 2022-08-11 | End: 2022-09-09

## 2022-08-11 RX ORDER — DEXTROSE AND SODIUM CHLORIDE 5; .45 G/100ML; G/100ML
30 INJECTION, SOLUTION INTRAVENOUS CONTINUOUS PRN
Status: CANCELLED | OUTPATIENT
Start: 2022-09-12
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
## 2022-08-12 LAB
QT INTERVAL: 340 MS
QTC INTERVAL: 420 MS

## 2022-08-17 DIAGNOSIS — F90.2 ATTENTION DEFICIT HYPERACTIVITY DISORDER (ADHD), COMBINED TYPE: ICD-10-CM

## 2022-08-17 RX ORDER — DEXTROAMPHETAMINE SACCHARATE, AMPHETAMINE ASPARTATE MONOHYDRATE, DEXTROAMPHETAMINE SULFATE AND AMPHETAMINE SULFATE 6.25; 6.25; 6.25; 6.25 MG/1; MG/1; MG/1; MG/1
25 CAPSULE, EXTENDED RELEASE ORAL EVERY MORNING
Qty: 30 CAPSULE | Refills: 0 | Status: SHIPPED | OUTPATIENT
Start: 2022-08-17 | End: 2022-09-19

## 2022-09-01 ENCOUNTER — LAB (OUTPATIENT)
Dept: LAB | Facility: HOSPITAL | Age: 27
End: 2022-09-01

## 2022-09-01 LAB
HAV IGM SERPL QL IA: NORMAL
HBV CORE IGM SERPL QL IA: NORMAL
HBV SURFACE AG SERPL QL IA: NORMAL
HCV AB SER DONR QL: NORMAL
INR PPP: 1.07 (ref 0.8–1.2)
PROTHROMBIN TIME: 13.8 SECONDS (ref 11.1–15.3)
T3FREE SERPL-MCNC: 3.24 PG/ML (ref 2–4.4)
T4 FREE SERPL-MCNC: 1.12 NG/DL (ref 0.93–1.7)
TSH SERPL DL<=0.05 MIU/L-ACNC: 0.97 UIU/ML (ref 0.27–4.2)

## 2022-09-01 PROCEDURE — 84460 ALANINE AMINO (ALT) (SGPT): CPT | Performed by: PHYSICIAN ASSISTANT

## 2022-09-01 PROCEDURE — 36415 COLL VENOUS BLD VENIPUNCTURE: CPT | Performed by: PHYSICIAN ASSISTANT

## 2022-09-01 PROCEDURE — 82785 ASSAY OF IGE: CPT | Performed by: PHYSICIAN ASSISTANT

## 2022-09-01 PROCEDURE — 84439 ASSAY OF FREE THYROXINE: CPT | Performed by: PHYSICIAN ASSISTANT

## 2022-09-01 PROCEDURE — 86003 ALLG SPEC IGE CRUDE XTRC EA: CPT | Performed by: PHYSICIAN ASSISTANT

## 2022-09-01 PROCEDURE — 86008 ALLG SPEC IGE RECOMB EA: CPT | Performed by: PHYSICIAN ASSISTANT

## 2022-09-01 PROCEDURE — 86258 DGP ANTIBODY EACH IG CLASS: CPT | Performed by: PHYSICIAN ASSISTANT

## 2022-09-01 PROCEDURE — 83010 ASSAY OF HAPTOGLOBIN QUANT: CPT | Performed by: PHYSICIAN ASSISTANT

## 2022-09-01 PROCEDURE — 85610 PROTHROMBIN TIME: CPT | Performed by: PHYSICIAN ASSISTANT

## 2022-09-01 PROCEDURE — 84450 TRANSFERASE (AST) (SGOT): CPT | Performed by: PHYSICIAN ASSISTANT

## 2022-09-01 PROCEDURE — 82977 ASSAY OF GGT: CPT | Performed by: PHYSICIAN ASSISTANT

## 2022-09-01 PROCEDURE — 84481 FREE ASSAY (FT-3): CPT | Performed by: PHYSICIAN ASSISTANT

## 2022-09-01 PROCEDURE — 86364 TISS TRNSGLTMNASE EA IG CLAS: CPT | Performed by: PHYSICIAN ASSISTANT

## 2022-09-01 PROCEDURE — 82247 BILIRUBIN TOTAL: CPT | Performed by: PHYSICIAN ASSISTANT

## 2022-09-01 PROCEDURE — 82172 ASSAY OF APOLIPOPROTEIN: CPT | Performed by: PHYSICIAN ASSISTANT

## 2022-09-01 PROCEDURE — 83883 ASSAY NEPHELOMETRY NOT SPEC: CPT | Performed by: PHYSICIAN ASSISTANT

## 2022-09-01 PROCEDURE — 82947 ASSAY GLUCOSE BLOOD QUANT: CPT | Performed by: PHYSICIAN ASSISTANT

## 2022-09-01 PROCEDURE — 82465 ASSAY BLD/SERUM CHOLESTEROL: CPT | Performed by: PHYSICIAN ASSISTANT

## 2022-09-01 PROCEDURE — 80074 ACUTE HEPATITIS PANEL: CPT | Performed by: PHYSICIAN ASSISTANT

## 2022-09-01 PROCEDURE — 84443 ASSAY THYROID STIM HORMONE: CPT | Performed by: PHYSICIAN ASSISTANT

## 2022-09-01 PROCEDURE — 84478 ASSAY OF TRIGLYCERIDES: CPT | Performed by: PHYSICIAN ASSISTANT

## 2022-09-02 LAB
GLIADIN PEPTIDE IGA SER-ACNC: 5 UNITS (ref 0–19)
GLIADIN PEPTIDE IGG SER-ACNC: 2 UNITS (ref 0–19)
TTG IGA SER-ACNC: <2 U/ML (ref 0–3)
TTG IGG SER-ACNC: 3 U/ML (ref 0–5)

## 2022-09-03 LAB
A2 MACROGLOB SERPL-MCNC: 208 MG/DL (ref 110–276)
ALT SERPL W P-5'-P-CCNC: 15 IU/L (ref 0–40)
APO A-I SERPL-MCNC: 128 MG/DL (ref 116–209)
AST SERPL W P-5'-P-CCNC: 16 IU/L (ref 0–40)
BILIRUB SERPL-MCNC: 0.3 MG/DL (ref 0–1.2)
CHOLEST SERPL-MCNC: 161 MG/DL (ref 100–199)
FIBROSIS SCORING:: NORMAL
FIBROSIS STAGE SERPL QL: NORMAL
GGT SERPL-CCNC: 7 IU/L (ref 0–60)
GLUCOSE SERPL-MCNC: 89 MG/DL (ref 65–99)
HAPTOGLOB SERPL-MCNC: 80 MG/DL (ref 33–278)
INTERPRETATIONS: (REFERENCE): NORMAL
LABORATORY COMMENT REPORT: NORMAL
LIVER FIBR SCORE SERPL CALC.FIBROSURE: 0.07 (ref 0–0.21)
NASH SCORING (REFERENCE): NORMAL
NECROINFLAMMATORY ACT GRADE SERPL QL: NORMAL
NECROINFLAMMATORY ACT SCORE SERPL: 0.25
SERVICE CMNT-IMP: NORMAL
STEATOSIS GRADE (REFERENCE): NORMAL
STEATOSIS GRADING (REFERENCE): NORMAL
STEATOSIS SCORE (REFERENCE): 0.2 (ref 0–0.3)
TRIGL SERPL-MCNC: 92 MG/DL (ref 0–149)

## 2022-09-07 LAB
ALPHA-GAL IGE QN: <0.1 KU/L
BEEF IGE QN: <0.1 KU/L
CONV CLASS DESCRIPTION: NORMAL
IGE SERPL-ACNC: 33 IU/ML (ref 6–495)
LAMB IGE QN: <0.1 KU/L
PORK IGE QN: <0.1 KU/L

## 2022-09-08 LAB
CODFISH IGE QN: <0.1 KU/L
CONV CLASS DESCRIPTION: NORMAL
COW MILK IGE QN: <0.1 KU/L
EGG WHITE IGE QN: <0.1 KU/L
GLUTEN IGE QN: <0.1 KU/L
HAZELNUT IGE QN: <0.1 KU/L
PEANUT IGE QN: <0.1 KU/L
SCALLOP IGE QN: <0.1 KU/L
SESAME SEED IGE QN: <0.1 KU/L
SHRIMP IGE QN: <0.1 KU/L
SOYBEAN IGE QN: <0.1 KU/L
WALNUT IGE QN: <0.1 KU/L
WHEAT IGE QN: <0.1 KU/L

## 2022-09-12 ENCOUNTER — HOSPITAL ENCOUNTER (OUTPATIENT)
Facility: HOSPITAL | Age: 27
Setting detail: HOSPITAL OUTPATIENT SURGERY
Discharge: HOME | End: 2022-09-12
Attending: INTERNAL MEDICINE | Admitting: INTERNAL MEDICINE

## 2022-09-12 ENCOUNTER — ANESTHESIA (OUTPATIENT)
Dept: GASTROENTEROLOGY | Facility: HOSPITAL | Age: 27
End: 2022-09-12

## 2022-09-12 ENCOUNTER — ANESTHESIA EVENT (OUTPATIENT)
Dept: GASTROENTEROLOGY | Facility: HOSPITAL | Age: 27
End: 2022-09-12

## 2022-09-12 VITALS
BODY MASS INDEX: 27.82 KG/M2 | OXYGEN SATURATION: 100 % | RESPIRATION RATE: 18 BRPM | SYSTOLIC BLOOD PRESSURE: 122 MMHG | WEIGHT: 157 LBS | DIASTOLIC BLOOD PRESSURE: 69 MMHG | HEIGHT: 63 IN | TEMPERATURE: 97.4 F | HEART RATE: 78 BPM

## 2022-09-12 DIAGNOSIS — K21.9 GASTROESOPHAGEAL REFLUX DISEASE, UNSPECIFIED WHETHER ESOPHAGITIS PRESENT: ICD-10-CM

## 2022-09-12 DIAGNOSIS — R63.4 WEIGHT LOSS: ICD-10-CM

## 2022-09-12 DIAGNOSIS — R19.7 DIARRHEA, UNSPECIFIED TYPE: ICD-10-CM

## 2022-09-12 DIAGNOSIS — R11.0 NAUSEA: ICD-10-CM

## 2022-09-12 DIAGNOSIS — R10.84 GENERALIZED ABDOMINAL PAIN: ICD-10-CM

## 2022-09-12 PROCEDURE — 25010000002 PROPOFOL 10 MG/ML EMULSION: Performed by: NURSE ANESTHETIST, CERTIFIED REGISTERED

## 2022-09-12 PROCEDURE — 25010000002 FENTANYL CITRATE (PF) 50 MCG/ML SOLUTION: Performed by: NURSE ANESTHETIST, CERTIFIED REGISTERED

## 2022-09-12 PROCEDURE — 43239 EGD BIOPSY SINGLE/MULTIPLE: CPT | Performed by: INTERNAL MEDICINE

## 2022-09-12 PROCEDURE — 25010000002 MIDAZOLAM PER 1 MG: Performed by: NURSE ANESTHETIST, CERTIFIED REGISTERED

## 2022-09-12 PROCEDURE — 45380 COLONOSCOPY AND BIOPSY: CPT | Performed by: INTERNAL MEDICINE

## 2022-09-12 RX ORDER — FENTANYL CITRATE 50 UG/ML
INJECTION, SOLUTION INTRAMUSCULAR; INTRAVENOUS AS NEEDED
Status: DISCONTINUED | OUTPATIENT
Start: 2022-09-12 | End: 2022-09-12 | Stop reason: SURG

## 2022-09-12 RX ORDER — DEXTROSE AND SODIUM CHLORIDE 5; .45 G/100ML; G/100ML
30 INJECTION, SOLUTION INTRAVENOUS CONTINUOUS PRN
Status: DISCONTINUED | OUTPATIENT
Start: 2022-09-12 | End: 2022-09-12 | Stop reason: HOSPADM

## 2022-09-12 RX ORDER — LIDOCAINE HYDROCHLORIDE 20 MG/ML
INJECTION, SOLUTION INTRAVENOUS AS NEEDED
Status: DISCONTINUED | OUTPATIENT
Start: 2022-09-12 | End: 2022-09-12 | Stop reason: SURG

## 2022-09-12 RX ORDER — PROPOFOL 10 MG/ML
VIAL (ML) INTRAVENOUS AS NEEDED
Status: DISCONTINUED | OUTPATIENT
Start: 2022-09-12 | End: 2022-09-12 | Stop reason: SURG

## 2022-09-12 RX ORDER — MIDAZOLAM HYDROCHLORIDE 1 MG/ML
INJECTION INTRAMUSCULAR; INTRAVENOUS AS NEEDED
Status: DISCONTINUED | OUTPATIENT
Start: 2022-09-12 | End: 2022-09-12 | Stop reason: SURG

## 2022-09-12 RX ADMIN — PROPOFOL 50 MG: 10 INJECTION, EMULSION INTRAVENOUS at 13:25

## 2022-09-12 RX ADMIN — FENTANYL CITRATE 100 MCG: 50 INJECTION INTRAMUSCULAR; INTRAVENOUS at 13:18

## 2022-09-12 RX ADMIN — MIDAZOLAM HYDROCHLORIDE 2 MG: 1 INJECTION, SOLUTION INTRAMUSCULAR; INTRAVENOUS at 13:18

## 2022-09-12 RX ADMIN — LIDOCAINE HYDROCHLORIDE 70 MG: 20 INJECTION, SOLUTION INTRAVENOUS at 13:23

## 2022-09-12 RX ADMIN — DEXTROSE AND SODIUM CHLORIDE 30 ML/HR: 5; 450 INJECTION, SOLUTION INTRAVENOUS at 12:28

## 2022-09-12 RX ADMIN — PROPOFOL 170 MG: 10 INJECTION, EMULSION INTRAVENOUS at 13:41
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
## 2022-09-12 NOTE — ANESTHESIA PREPROCEDURE EVALUATION
Anesthesia Evaluation     Patient summary reviewed and Nursing notes reviewed   no history of anesthetic complications:  NPO Solid Status: > 8 hours  NPO Liquid Status: > 8 hours           Airway   Mallampati: II  TM distance: <3 FB  Neck ROM: full  Large neck circumference  Dental    (+) poor dentition    Pulmonary - negative pulmonary ROS and normal exam    breath sounds clear to auscultation  (-) shortness of breath  Cardiovascular - negative cardio ROS and normal exam  Exercise tolerance: good (4-7 METS)    Rhythm: regular  Rate: normal    (-) CAD, angina, orthopnea, ALVARENGA      Neuro/Psych  (+) headaches, numbness, psychiatric history Anxiety,    (-) CVA  GI/Hepatic/Renal/Endo    (+) obesity, morbid obesity, GERD poorly controlled,      Musculoskeletal (-) negative ROS    Abdominal   (+) obese,     Abdomen: tender.   Substance History - negative use     OB/GYN    (-)  Pregnant        Other - negative ROS                         Anesthesia Plan    ASA 2     MAC     intravenous induction     Anesthetic plan, risks, benefits, and alternatives have been provided, discussed and informed consent has been obtained with: patient.

## 2022-09-12 NOTE — ANESTHESIA POSTPROCEDURE EVALUATION
Patient: Tanja Brice    Procedure Summary     Date: 09/12/22 Room / Location: [facility] ENDOSCOPY 1 / [facility] ENDOSCOPY    Anesthesia Start: 1324 Anesthesia Stop: 1353    Procedures:       ESOPHAGOGASTRODUODENOSCOPY (N/A )      COLONOSCOPY (N/A ) Diagnosis:       Generalized abdominal pain      Nausea      Gastroesophageal reflux disease, unspecified whether esophagitis present      Weight loss      Diarrhea, unspecified type      (Generalized abdominal pain [R10.84])      (Nausea [R11.0])      (Gastroesophageal reflux disease, unspecified whether esophagitis present [K21.9])      (Weight loss [R63.4])      (Diarrhea, unspecified type [R19.7])    Surgeons: Saroj Luna MD Provider: Kaitlin Wood CRNA    Anesthesia Type: MAC ASA Status: 2          Anesthesia Type: MAC    Vitals  Vitals Value Taken Time   /69 09/12/22 1352   Temp 97.4 °F (36.3 °C) 09/12/22 1344   Pulse 78 09/12/22 1352   Resp 18 09/12/22 1352   SpO2 100 % 09/12/22 1352           Post Anesthesia Care and Evaluation    Patient location during evaluation: PACU  Patient participation: complete - patient participated  Level of consciousness: awake and alert  Pain score: 0  Pain management: adequate    Airway patency: patent  Anesthetic complications: No anesthetic complications  PONV Status: none  Cardiovascular status: acceptable  Respiratory status: acceptable  Hydration status: acceptable

## 2022-09-15 LAB — REF LAB TEST METHOD: NORMAL

## 2022-09-19 ENCOUNTER — OFFICE VISIT (OUTPATIENT)
Dept: GASTROENTEROLOGY | Facility: CLINIC | Age: 27
End: 2022-09-19

## 2022-09-19 VITALS
DIASTOLIC BLOOD PRESSURE: 88 MMHG | BODY MASS INDEX: 28.1 KG/M2 | HEIGHT: 63 IN | WEIGHT: 158.6 LBS | SYSTOLIC BLOOD PRESSURE: 126 MMHG | HEART RATE: 98 BPM

## 2022-09-19 DIAGNOSIS — K21.00 GASTROESOPHAGEAL REFLUX DISEASE WITH ESOPHAGITIS WITHOUT HEMORRHAGE: ICD-10-CM

## 2022-09-19 DIAGNOSIS — A04.8 HELICOBACTER PYLORI INFECTION: Primary | ICD-10-CM

## 2022-09-19 DIAGNOSIS — F90.2 ATTENTION DEFICIT HYPERACTIVITY DISORDER (ADHD), COMBINED TYPE: ICD-10-CM

## 2022-09-19 DIAGNOSIS — R10.84 GENERALIZED ABDOMINAL PAIN: ICD-10-CM

## 2022-09-19 PROCEDURE — 99214 OFFICE O/P EST MOD 30 MIN: CPT | Performed by: PHYSICIAN ASSISTANT

## 2022-09-19 RX ORDER — OMEPRAZOLE 20 MG/1
20 CAPSULE, DELAYED RELEASE ORAL 2 TIMES DAILY
Qty: 60 CAPSULE | Refills: 1 | Status: SHIPPED | OUTPATIENT
Start: 2022-09-19 | End: 2022-12-05 | Stop reason: SDUPTHER

## 2022-09-19 RX ORDER — CLARITHROMYCIN 500 MG/1
500 TABLET, COATED ORAL 2 TIMES DAILY
Qty: 28 TABLET | Refills: 0 | Status: SHIPPED | OUTPATIENT
Start: 2022-09-19 | End: 2022-10-17

## 2022-09-19 RX ORDER — METRONIDAZOLE 500 MG/1
500 TABLET ORAL 2 TIMES DAILY
Qty: 28 TABLET | Refills: 0 | Status: SHIPPED | OUTPATIENT
Start: 2022-09-19 | End: 2022-10-17

## 2022-09-19 RX ORDER — DEXTROAMPHETAMINE SACCHARATE, AMPHETAMINE ASPARTATE MONOHYDRATE, DEXTROAMPHETAMINE SULFATE AND AMPHETAMINE SULFATE 6.25; 6.25; 6.25; 6.25 MG/1; MG/1; MG/1; MG/1
25 CAPSULE, EXTENDED RELEASE ORAL EVERY MORNING
Qty: 30 CAPSULE | Refills: 0 | Status: SHIPPED | OUTPATIENT
Start: 2022-09-19 | End: 2022-10-26

## 2022-09-19 NOTE — TELEPHONE ENCOUNTER
Last Rx 08/17/2022  #30, NR    Next Appt  With Family Medicine (Brooke Denise MD)  10/17/2022 at 3:30 PM    Last OV 07/15/2022

## 2022-10-17 ENCOUNTER — OFFICE VISIT (OUTPATIENT)
Dept: FAMILY MEDICINE CLINIC | Facility: CLINIC | Age: 27
End: 2022-10-17

## 2022-10-17 VITALS
HEART RATE: 91 BPM | OXYGEN SATURATION: 98 % | HEIGHT: 63 IN | DIASTOLIC BLOOD PRESSURE: 80 MMHG | SYSTOLIC BLOOD PRESSURE: 120 MMHG | WEIGHT: 153 LBS | BODY MASS INDEX: 27.11 KG/M2

## 2022-10-17 DIAGNOSIS — F90.2 ATTENTION DEFICIT HYPERACTIVITY DISORDER (ADHD), COMBINED TYPE: Primary | ICD-10-CM

## 2022-10-17 PROCEDURE — 99213 OFFICE O/P EST LOW 20 MIN: CPT | Performed by: FAMILY MEDICINE

## 2022-10-20 ENCOUNTER — OFFICE VISIT (OUTPATIENT)
Dept: GASTROENTEROLOGY | Facility: CLINIC | Age: 27
End: 2022-10-20

## 2022-10-20 VITALS
OXYGEN SATURATION: 100 % | HEIGHT: 63 IN | DIASTOLIC BLOOD PRESSURE: 82 MMHG | BODY MASS INDEX: 26.93 KG/M2 | SYSTOLIC BLOOD PRESSURE: 126 MMHG | HEART RATE: 107 BPM | WEIGHT: 152 LBS

## 2022-10-20 DIAGNOSIS — K21.00 GASTROESOPHAGEAL REFLUX DISEASE WITH ESOPHAGITIS WITHOUT HEMORRHAGE: Primary | ICD-10-CM

## 2022-10-20 DIAGNOSIS — R10.84 GENERALIZED ABDOMINAL PAIN: ICD-10-CM

## 2022-10-20 PROCEDURE — 99214 OFFICE O/P EST MOD 30 MIN: CPT | Performed by: PHYSICIAN ASSISTANT

## 2022-10-26 DIAGNOSIS — F90.2 ATTENTION DEFICIT HYPERACTIVITY DISORDER (ADHD), COMBINED TYPE: ICD-10-CM

## 2022-10-26 RX ORDER — DEXTROAMPHETAMINE SACCHARATE, AMPHETAMINE ASPARTATE MONOHYDRATE, DEXTROAMPHETAMINE SULFATE AND AMPHETAMINE SULFATE 6.25; 6.25; 6.25; 6.25 MG/1; MG/1; MG/1; MG/1
25 CAPSULE, EXTENDED RELEASE ORAL EVERY MORNING
Qty: 30 CAPSULE | Refills: 0 | Status: SHIPPED | OUTPATIENT
Start: 2022-10-26 | End: 2022-12-05

## 2022-10-26 NOTE — TELEPHONE ENCOUNTER
Last Rx 09/19/2022  #30, NR    Next Appt  With Family Medicine (Brooke Denise MD)  01/18/2023 at 3:45 PM    Last OV 10/17/2022

## 2022-11-07 NOTE — PROGRESS NOTES
"Chief Complaint  ADHD    Subjective    History of Present Illness {CC  Problem List  Visit  Diagnosis   Encounters  Notes  Medications  Labs  Result Review Imaging  Media :23}     Tanja Brice presents to [facility] [place] for     Chief Complaint   Patient presents with   • ADHD      Patient seen today for ADHD follow up.  Taking Adderall XR 25 mg daily.  Medication helping with concentration and task completion.  No adverse effects.      Current Outpatient Medications:   •  omeprazole (priLOSEC) 20 MG capsule, Take 1 capsule by mouth 2 (Two) Times a Day., Disp: 60 capsule, Rfl: 1  •  amphetamine-dextroamphetamine XR (ADDERALL XR) 25 MG 24 hr capsule, TAKE 1 CAPSULE BY MOUTH EVERY MORNING, Disp: 30 capsule, Rfl: 0  •  Biotin w/ Vitamins C & E 1250-7.5-7.5 MCG-MG-UNT chewable tablet, Chew. 1 - 2 tablets by mouth in A.M., Disp: , Rfl:   •  CRANBERRY EXTRACT PO, Take  by mouth 2 (two) times a day., Disp: , Rfl:   •  ferrous sulfate 324 (65 Fe) MG tablet delayed-release EC tablet, Take 324 mg by mouth Daily With Breakfast., Disp: , Rfl:      Objective       Vital Signs:   /80   Pulse 91   Ht 160 cm (63\")   Wt 69.4 kg (153 lb)   SpO2 98%   BMI 27.10 kg/m²     Physical Exam  Vitals reviewed.   Constitutional:       General: She is not in acute distress.     Appearance: She is well-developed.   Cardiovascular:      Rate and Rhythm: Normal rate and regular rhythm.      Heart sounds: Normal heart sounds. No murmur heard.  Pulmonary:      Effort: Pulmonary effort is normal.      Breath sounds: Normal breath sounds. No wheezing.   Skin:     General: Skin is warm and dry.   Neurological:      Mental Status: She is alert and oriented to person, place, and time.        Result Review :{ Labs  Result Review  Imaging  Med Tab  Media :23}   The following data was reviewed by: Brooke Denise MD on 10/17/2022    Common labs    Common Labs 8/3/22 " 8/3/22 9/1/22    1442 1442    Glucose  117 (A)    BUN  6    Creatinine  0.74    Sodium  140    Potassium  3.6    Chloride  104    Calcium  9.4    Albumin  4.30    Total Bilirubin  0.4    Alkaline Phosphatase  61    AST (SGOT)  15    ALT (SGPT)  14    WBC 5.91     Hemoglobin 13.5     Hematocrit 38.0     Platelets 251     Triglycerides   92   (A) Abnormal value                      Assessment and Plan {CC Problem List  Visit Diagnosis  ROS  Review (Popup)  Health Maintenance  Quality  BestPractice  Medications  SmartSets  SnapShot Encounters  Media :23}   Diagnoses and all orders for this visit:    1. Attention deficit hyperactivity disorder (ADHD), combined type (Primary)       Patient seen today for ADHD follow up  Current medication regimen working well, continue  Follow up in 3 months    Follow Up {Instructions Charge Capture  Follow-up Communications :23}   Return in about 3 months (around 1/17/2023) for Recheck.  Patient was given instructions and counseling regarding her condition or for health maintenance advice. Please see specific information pulled into the AVS if appropriate.            This document has been electronically signed by Brooke Denise MD

## 2022-11-11 ENCOUNTER — TELEPHONE (OUTPATIENT)
Dept: FAMILY MEDICINE CLINIC | Facility: CLINIC | Age: 27
End: 2022-11-11

## 2022-11-11 NOTE — TELEPHONE ENCOUNTER
Called and let patient know that a request needs to be sent or she will need to speak to medical records.

## 2022-11-11 NOTE — TELEPHONE ENCOUNTER
Patient called stating [facility] was needing general medical records for 12 months review. Patient said this was for a clam but she was unsure what all they were needing. Patient gave a fax # [phone] for [facility]. Patient said if you have questions please call [phone]

## 2022-12-03 DIAGNOSIS — F90.2 ATTENTION DEFICIT HYPERACTIVITY DISORDER (ADHD), COMBINED TYPE: ICD-10-CM

## 2022-12-05 ENCOUNTER — OFFICE VISIT (OUTPATIENT)
Dept: GASTROENTEROLOGY | Facility: CLINIC | Age: 27
End: 2022-12-05

## 2022-12-05 VITALS
HEIGHT: 63 IN | WEIGHT: 150 LBS | HEART RATE: 67 BPM | BODY MASS INDEX: 26.58 KG/M2 | SYSTOLIC BLOOD PRESSURE: 122 MMHG | DIASTOLIC BLOOD PRESSURE: 84 MMHG | OXYGEN SATURATION: 100 %

## 2022-12-05 DIAGNOSIS — R10.84 GENERALIZED ABDOMINAL PAIN: ICD-10-CM

## 2022-12-05 DIAGNOSIS — K21.00 GASTROESOPHAGEAL REFLUX DISEASE WITH ESOPHAGITIS WITHOUT HEMORRHAGE: Primary | ICD-10-CM

## 2022-12-05 PROCEDURE — 99213 OFFICE O/P EST LOW 20 MIN: CPT | Performed by: PHYSICIAN ASSISTANT

## 2022-12-05 RX ORDER — DEXTROAMPHETAMINE SACCHARATE, AMPHETAMINE ASPARTATE MONOHYDRATE, DEXTROAMPHETAMINE SULFATE AND AMPHETAMINE SULFATE 6.25; 6.25; 6.25; 6.25 MG/1; MG/1; MG/1; MG/1
25 CAPSULE, EXTENDED RELEASE ORAL EVERY MORNING
Qty: 30 CAPSULE | Refills: 0 | Status: SHIPPED | OUTPATIENT
Start: 2022-12-05 | End: 2023-01-07

## 2022-12-05 RX ORDER — OMEPRAZOLE 20 MG/1
20 CAPSULE, DELAYED RELEASE ORAL 2 TIMES DAILY
Qty: 60 CAPSULE | Refills: 2 | Status: SHIPPED | OUTPATIENT
Start: 2022-12-05 | End: 2023-03-31

## 2022-12-05 NOTE — PROGRESS NOTES
Chief Complaint   Patient presents with   • Follow-up     6 week   • Abdominal Pain   • Heartburn       ENDO PROCEDURE ORDERED:    Subjective    Tanja Brice is a 27 y.o. female. she is here today for follow-up.    History of Present Illness    The patient is seen on a recheck of her GERD and abdominal pain.  Last seen 10/20/2022.  Patient states her GERD is usually doing well on Prilosec 20 mg b.i.d., but certain foods do seem to bother her, particularly bread and lettuce.  She denied dysphagia.  Bowels are moving without blood or mucus.  Weight is down 2 pounds since last visit.  Last EGD/colonoscopy 09/12/2022 showed grade 3 esophagitis, H. pylori positive gastritis and hemorrhoids.      ASSESSMENT AND PLAN:  Patient did not get laboratories drawn as previously requested.  GERD appears to be stable on current regimen.  Encouraged dietary modification and weight loss, avoiding gastric irritants.  We will plan followup in 3 months, sooner if needed.  Further pending clinical course and the results of the above.  She was encouraged to get her laboratories drawn.        The following portions of the patient's history were reviewed and updated as appropriate:   Past Medical History:   Diagnosis Date   • Anemia during pregnancy in third trimester 9/19/2017   • Anxiety    • Chest pain, unspecified    • Contact dermatitis due to poison ivy    • Exanthem due to herpes zoster    • H/O echocardiogram 12/15/2015    Normal LV function with Ef 60% without regional wall motion abnormalities. Normal Ev size with normal function. Normal diastolic function. No significant valvular regurgitation or stenosis   • Herpes zoster ophthalmicus     no ocular involvement      • History of chicken pox    • History of Holter monitoring 12/15/2015    Sinus mechanism with normal average heart rate with no evicence of chronotropic incompetence. Normal burden of ventricular and supraventricular ectopic event. Symptoms correlated only  with sinus mechanism   • Migraine    • Obesity affecting pregnancy 2017   • Palpitations    • Shingles    • Temporomandibular joint disorder      Past Surgical History:   Procedure Laterality Date   •  SECTION N/A 2017    Procedure:  SECTION PRIMARY;  Surgeon: Jarad Oliver MD;  Location: [facility] LABOR DELIVERY;  Service:    • CHOLECYSTECTOMY WITH INTRAOPERATIVE CHOLANGIOGRAM N/A 2020    Procedure: LAPAROSCOPIC CHOLECYSTECTOMY WITH INTRAOPERATIVE CHOLANGIOGRAM               (c-arm#1);  Surgeon: Tristin Brownlee MD;  Location: [facility] OR;  Service: General;  Laterality: N/A;   • COLONOSCOPY N/A 2022    Procedure: COLONOSCOPY;  Surgeon: Saroj Luan MD;  Location: [facility] ENDOSCOPY;  Service: Gastroenterology;  Laterality: N/A;   • ENDOSCOPY N/A 2022    Procedure: ESOPHAGOGASTRODUODENOSCOPY;  Surgeon: Saroj Luna MD;  Location: [facility] ENDOSCOPY;  Service: Gastroenterology;  Laterality: N/A;   • FOREIGN BODY REMOVAL Left 2017    splinter removed from left great toe. nerve block used.    • TUBAL ABDOMINAL LIGATION     • WISDOM TOOTH EXTRACTION       Family History   Adopted: Yes   Problem Relation Age of Onset   • Hyperthyroidism Mother    • Esophageal cancer Mother    • Stroke Father    • Hyperthyroidism Sister    • Hyperthyroidism Sister    • Thyroid disease Sister    • ADD / ADHD Brother    • ADD / ADHD Brother    • Other Other         Back Pain     OB History        3    Para   3    Term   3            AB        Living   3       SAB        IAB        Ectopic        Molar        Multiple        Live Births   3          Obstetric Comments   2022 - Patient reports 3 pregnancies and 0 miscarriages.           No Known Allergies  Social History     Socioeconomic History   • Marital status:    Tobacco Use   • Smoking status: Never   • Smokeless tobacco: Never   Vaping Use   • Vaping Use: Some days   • Substances: Nicotine, Flavoring   •  "Devices: Disposable   Substance and Sexual Activity   • Alcohol use: Yes     Comment: socially   • Drug use: Never   • Sexual activity: Defer     Current Medications:  Prior to Admission medications    Medication Sig Start Date End Date Taking? Authorizing Provider   amphetamine-dextroamphetamine XR (ADDERALL XR) 25 MG 24 hr capsule TAKE 1 CAPSULE BY MOUTH EVERY MORNING 12/5/22  Yes Brooke Denise MD   ferrous sulfate 324 (65 Fe) MG tablet delayed-release EC tablet Take 324 mg by mouth Daily With Breakfast.   Yes Kaz Monsivais MD   omeprazole (priLOSEC) 20 MG capsule Take 1 capsule by mouth 2 (Two) Times a Day. 9/19/22  Yes Sid Judd PA-C   Biotin w/ Vitamins C & E 1250-7.5-7.5 MCG-MG-UNT chewable tablet Chew. 1 - 2 tablets by mouth in A.M.    Kaz Monsivais MD   CRANBERRY EXTRACT PO Take  by mouth 2 (two) times a day.    Kaz Monsivais MD   amphetamine-dextroamphetamine XR (ADDERALL XR) 25 MG 24 hr capsule TAKE 1 CAPSULE BY MOUTH EVERY MORNING 10/26/22 12/5/22  Brooke Denise MD     Review of Systems  Review of Systems       Objective    /84 (BP Location: Left arm, Patient Position: Sitting, Cuff Size: Adult)   Pulse 67   Ht 160 cm (63\")   Wt 68 kg (150 lb)   SpO2 100%   BMI 26.57 kg/m²   Physical Exam  Vitals and nursing note reviewed.   Constitutional:       General: She is not in acute distress.     Appearance: She is well-developed.   HENT:      Head: Normocephalic and atraumatic.   Eyes:      Pupils: Pupils are equal, round, and reactive to light.   Cardiovascular:      Rate and Rhythm: Normal rate and regular rhythm.      Heart sounds: Normal heart sounds.   Pulmonary:      Effort: Pulmonary effort is normal.      Breath sounds: Normal breath sounds.   Abdominal:      General: Bowel sounds are normal. There is no distension or abdominal bruit.      Palpations: Abdomen is soft. Abdomen is not rigid. There is no shifting dullness or mass.      Tenderness: There " is abdominal tenderness. There is no guarding or rebound.      Hernia: No hernia is present. There is no hernia in the ventral area.   Musculoskeletal:         General: Normal range of motion.      Cervical back: Normal range of motion.   Skin:     General: Skin is warm and dry.   Neurological:      Mental Status: She is alert and oriented to person, place, and time.   Psychiatric:         Behavior: Behavior normal.         Thought Content: Thought content normal.         Judgment: Judgment normal.       Assessment & Plan      1. Gastroesophageal reflux disease with esophagitis without hemorrhage    2. Generalized abdominal pain    .   Diagnoses and all orders for this visit:    1. Gastroesophageal reflux disease with esophagitis without hemorrhage (Primary)    2. Generalized abdominal pain    Other orders  -     omeprazole (priLOSEC) 20 MG capsule; Take 1 capsule by mouth 2 (Two) Times a Day.  Dispense: 60 capsule; Refill: 2        Orders placed during this encounter include:  No orders of the defined types were placed in this encounter.      Medications prescribed:  New Medications Ordered This Visit   Medications   • omeprazole (priLOSEC) 20 MG capsule     Sig: Take 1 capsule by mouth 2 (Two) Times a Day.     Dispense:  60 capsule     Refill:  2       Requested Prescriptions     Signed Prescriptions Disp Refills   • omeprazole (priLOSEC) 20 MG capsule 60 capsule 2     Sig: Take 1 capsule by mouth 2 (Two) Times a Day.       Review and/or summary of lab tests, radiology, procedures, medications. Review and summary of old records and obtaining of history. The risks and benefits of my recommendations, as well as other treatment options were discussed with the patient today. Questions were answered.    Follow-up: Return in about 4 months (around 4/5/2023), or if symptoms worsen or fail to improve.     * Surgery not found *      This document has been electronically signed by Sid Judd PA-C on December 16, 2022  15:16 CST      Results for orders placed or performed during the hospital encounter of 12/15/22   Gold Top - SST   Result Value Ref Range    Extra Tube Hold for add-ons.    Green Top (Gel)   Result Value Ref Range    Extra Tube Hold for add-ons.    Urinalysis, Microscopic Only - Urine, Clean Catch    Specimen: Urine, Clean Catch   Result Value Ref Range    RBC, UA Too Numerous to Count (A) None Seen /HPF    WBC, UA Too Numerous to Count (A) None Seen, 0-2, 3-5 /HPF    Bacteria, UA 4+ (A) None Seen /HPF    Squamous Epithelial Cells, UA 0-2 None Seen, 0-2 /HPF    Hyaline Casts, UA 0-2 None Seen /LPF    Methodology Automated Microscopy    Urinalysis With Microscopic If Indicated (No Culture) - Urine, Clean Catch    Specimen: Urine, Clean Catch   Result Value Ref Range    Color, UA Dark Yellow Yellow, Straw, Dark Yellow, Va    Appearance, UA Cloudy (A) Clear    pH, UA 6.0 5.0 - 9.0    Specific [place], UA 1.026 1.003 - 1.030    Glucose, UA Negative Negative    Ketones, UA Trace (A) Negative    Bilirubin, UA Small (1+) (A) Negative    Blood, UA Large (3+) (A) Negative    Protein, UA >=300 mg/dL (3+) (A) Negative    Leuk Esterase, UA Small (1+) (A) Negative    Nitrite, UA Positive (A) Negative    Urobilinogen, UA 1.0 E.U./dL 0.2 - 1.0 E.U./dL   CBC Auto Differential    Specimen: Blood   Result Value Ref Range    WBC 9.72 3.40 - 10.80 10*3/mm3    RBC 4.53 3.77 - 5.28 10*6/mm3    Hemoglobin 13.2 12.0 - 15.9 g/dL    Hematocrit 39.4 34.0 - 46.6 %    MCV 87.0 79.0 - 97.0 fL    MCH 29.1 26.6 - 33.0 pg    MCHC 33.5 31.5 - 35.7 g/dL    RDW 12.2 (L) 12.3 - 15.4 %    RDW-SD 38.9 37.0 - 54.0 fl    MPV 9.9 6.0 - 12.0 fL    Platelets 261 140 - 450 10*3/mm3    Neutrophil % 75.7 42.7 - 76.0 %    Lymphocyte % 17.8 (L) 19.6 - 45.3 %    Monocyte % 6.0 5.0 - 12.0 %    Eosinophil % 0.0 (L) 0.3 - 6.2 %    Basophil % 0.3 0.0 - 1.5 %    Immature Grans % 0.2 0.0 - 0.5 %    Neutrophils, Absolute 7.36 (H) 1.70 - 7.00 10*3/mm3    Lymphocytes,  Absolute 1.73 0.70 - 3.10 10*3/mm3    Monocytes, Absolute 0.58 0.10 - 0.90 10*3/mm3    Eosinophils, Absolute 0.00 0.00 - 0.40 10*3/mm3    Basophils, Absolute 0.03 0.00 - 0.20 10*3/mm3    Immature Grans, Absolute 0.02 0.00 - 0.05 10*3/mm3    nRBC 0.0 0.0 - 0.2 /100 WBC   Lavender Top   Result Value Ref Range    Extra Tube hold for add-on    Light Blue Top   Result Value Ref Range    Extra Tube Hold for add-ons.    Urine Culture - Urine, Urine, Clean Catch    Specimen: Urine, Clean Catch   Result Value Ref Range    Urine Culture >100,000 CFU/mL Gram Negative Bacilli (A)    Lipase    Specimen: Blood   Result Value Ref Range    Lipase 21 13 - 60 U/L   Comprehensive Metabolic Panel    Specimen: Blood   Result Value Ref Range    Glucose 84 65 - 99 mg/dL    BUN 9 6 - 20 mg/dL    Creatinine 0.81 0.57 - 1.00 mg/dL    Sodium 139 136 - 145 mmol/L    Potassium 3.6 3.5 - 5.2 mmol/L    Chloride 103 98 - 107 mmol/L    CO2 26.0 22.0 - 29.0 mmol/L    Calcium 9.2 8.6 - 10.5 mg/dL    Total Protein 7.2 6.0 - 8.5 g/dL    Albumin 4.40 3.50 - 5.20 g/dL    ALT (SGPT) 8 1 - 33 U/L    AST (SGOT) 11 1 - 32 U/L    Alkaline Phosphatase 65 39 - 117 U/L    Total Bilirubin 0.4 0.0 - 1.2 mg/dL    Globulin 2.8 gm/dL    A/G Ratio 1.6 g/dL    BUN/Creatinine Ratio 11.1 7.0 - 25.0    Anion Gap 10.0 5.0 - 15.0 mmol/L    eGFR 100.9 >60.0 mL/min/1.73   Results for orders placed or performed during the hospital encounter of 09/12/22   TISSUE EXAM, [facility] (SUSAN,COR,MAD)    Specimen: A: Small Intestine, Duodenum; Tissue    B: Gastric, Antrum; Tissue    C: Esophagus, Distal; Tissue    D: Large Intestine; Tissue   Result Value Ref Range    Reference Lab Report       [facility]  [address] [place] [address]  Phone: [phone] or [phone]  Fax: [phone]  Bharath Becker M.D., Medical Director    PATIENT NAME                           LABORATORY NO.  [address]  [address].                 [id number]  [id number]                         AGE              SEX  SSN           CLIENT REF #  [facility]           27      1995  F    [id number]   [id number]    [place]                       REQUESTING M.D.     ATTENDING M.D.     COPY TO.  [address] [place] [address]                 SHEL[place] [address]             DATE COLLECTED      DATE RECEIVED      DATE REPORTED  2022          2022         09/15/2022    DIAGNOSIS:  A.   DUODENUM, BIOPSY:  No significant histologic abnormality  B.   GASTRIC ANTRUM, BIOPSY:  Active chronic gastritis  Positive for Helicobacter pylori  C.   ESOPHAGUS, BIOPSY, DISTAL:  Reactive squamous mucosa  D.    COLON, BIOPSY:  No significant histologic abnormality    JBS/pah    COMMENT:  I ordered H. pylori immunohistochemical (IHC) stain on block  B1 because a typical inflammatory infiltrate was present, and organisms are not  seen on H&E staining.  H. pylori IHC stain is positive for Helicobacter pylori.  Control tissue stains as expected.    CLINICAL HISTORY:  Generalized abdominal pain, Nausea, Gastroesophageal reflux disease without  esophagitis, Weight loss, Diarrhea    SPECIMENS RECEIVED:  A.  DUODENUM, BIOPSY  B.  GASTRIC ANTRUM, BIOPSY  C.  ESOPHAGUS, BIOPSY, DISTAL  D.  COLON, BIOPSY    MICROSCOPIC DESCRIPTION:  Tissue blocks are prepared and slides are examined microscopically on all  specimens. See diagnosis for details.    The internal and external (both positive and negative) controls reacted  appropriately. Some of our immunohistochemical and in situ hybridization  studies are performed as analyte specific reagents. The following statement  applies to those tests: This test was  developed, and its performance  characteristics determined by [facility]. It has not been  cleared or approved by the US Food and Drug Administration. However, the  FDA has determined that approval and clearance  "are not necessary.    Professional interpretation rendered by Antolin George M.D. at [facility]  [facility], [address] [place] [address].    GROSS DESCRIPTION:  A.  Specimen is received in 1 formalin filled container labeled \"duodenum\"  and consists of 2 portions of tan soft tissue measuring 0.6 x 0.5 x 0.2 cm.  Specimen is submitted entirely in 1 cassette.  BW  B.  Specimen is received in 1 formalin filled container labeled \"gastric,  antrum\" and consists of 2 portions of tan soft tissue measuring 0.5 x 0.5 x  0.2 cm.  Specimen is submitted entirely in 1 cassette.  C.  Specimen is received in 1 formalin filled container labeled \"distal  esophagus\" and consists of 2 portions of tan soft tissue measuring 0.5 x 0.3  x 0.1 cm.  The specimen is  submitted entirely in 1 cassette.  D.  Specimen is received in 1 formalin filled container labeled \"colonic  mucosa\" and consists of 3 portions of tan soft tissue measuring 0.6 x 0.5 x  0.2 cm.  The specimen is submitted entirely in 1 cassette.    REVIEWED, DIAGNOSED AND ELECTRONICALLY  SIGNED BY:    Antolin George M.D.  CPT CODES:  88305x4, 64733     Results for orders placed or performed in visit on 08/11/22   STEVE Fibrosure    Specimen: Blood   Result Value Ref Range    Fibrosis Score (References) 0.07 0.00 - 0.21    Fibrosis Stage (Reference) Comment     Steatosis Score (Reference) 0.20 0.00 - 0.30    Steatosis Grade (Reference) Comment     STEVE Score (Reference) 0.25 0.25    Steve Grade (Reference) Comment     Height: (Reference) 63 in    Weight: (Reference) 161 LBS    Alpha 2-Macroglobulins, Qn 208 110 - 276 mg/dL    Haptoglobin 80 33 - 278 mg/dL    Apolipoprotein A-1 128 116 - 209 mg/dL    Total Bilirubin 0.3 0.0 - 1.2 mg/dL    GGT 7 0 - 60 IU/L    ALT (SGPT) 15 0 - 40 IU/L    AST (SGOT) P5P (Reference) 16 0 - 40 IU/L    Cholesterol, Total (Reference) 161 100 - 199 mg/dL    Glucose, Serum (Reference) 89 65 - 99 mg/dL    Triglycerides 92 0 - 149 mg/dL    " Interpretations: (Reference) Comment     Fibrosis Scoring: Comment     Steatosis Grading (Reference) Comment     Maier Scoring (Reference) Comment     Limitations: (Reference) Comment     Comment (Reference) Comment    Alpha-Gal IgE Panel    Specimen: Blood   Result Value Ref Range    Class Description Comment     IgE 33 6 - 495 IU/mL    U485-[id number] Alpha-Gal <0.10 Class 0 kU/L    Beef <0.10 Class 0 kU/L    Pork <0.10 Class 0 kU/L    Lamb <0.10 Class 0 kU/L   Glia(IgA / G) & TTG(IgA / G)    Specimen: Blood   Result Value Ref Range    Gliadin Deamidated Peptide Ab, IgA 5 0 - 19 units    Deaminated Gliadin Ab IgG 2 0 - 19 units    Tissue Transglutaminase IgA <2 0 - 3 U/mL    Tissue Transglutaminase IgG 3 0 - 5 U/mL     *Note: Due to a large number of results and/or encounters for the requested time period, some results have not been displayed. A complete set of results can be found in Results Review.

## 2022-12-15 ENCOUNTER — APPOINTMENT (OUTPATIENT)
Dept: ULTRASOUND IMAGING | Facility: HOSPITAL | Age: 27
End: 2022-12-15

## 2022-12-15 ENCOUNTER — HOSPITAL ENCOUNTER (EMERGENCY)
Facility: HOSPITAL | Age: 27
Discharge: HOME | End: 2022-12-15
Attending: EMERGENCY MEDICINE | Admitting: EMERGENCY MEDICINE

## 2022-12-15 VITALS
TEMPERATURE: 97.7 F | SYSTOLIC BLOOD PRESSURE: 108 MMHG | WEIGHT: 145 LBS | HEART RATE: 84 BPM | RESPIRATION RATE: 16 BRPM | DIASTOLIC BLOOD PRESSURE: 69 MMHG | BODY MASS INDEX: 25.69 KG/M2 | HEIGHT: 63 IN | OXYGEN SATURATION: 100 %

## 2022-12-15 DIAGNOSIS — R31.9 URINARY TRACT INFECTION WITH HEMATURIA, SITE UNSPECIFIED: Primary | ICD-10-CM

## 2022-12-15 DIAGNOSIS — N39.0 URINARY TRACT INFECTION WITH HEMATURIA, SITE UNSPECIFIED: Primary | ICD-10-CM

## 2022-12-15 LAB
ALBUMIN SERPL-MCNC: 4.4 G/DL (ref 3.5–5.2)
ALBUMIN/GLOB SERPL: 1.6 G/DL
ALP SERPL-CCNC: 65 U/L (ref 39–117)
ALT SERPL W P-5'-P-CCNC: 8 U/L (ref 1–33)
ANION GAP SERPL CALCULATED.3IONS-SCNC: 10 MMOL/L (ref 5–15)
AST SERPL-CCNC: 11 U/L (ref 1–32)
BACTERIA UR QL AUTO: ABNORMAL /HPF
BASOPHILS # BLD AUTO: 0.03 10*3/MM3 (ref 0–0.2)
BASOPHILS NFR BLD AUTO: 0.3 % (ref 0–1.5)
BILIRUB SERPL-MCNC: 0.4 MG/DL (ref 0–1.2)
BILIRUB UR QL STRIP: ABNORMAL
BUN SERPL-MCNC: 9 MG/DL (ref 6–20)
BUN/CREAT SERPL: 11.1 (ref 7–25)
CALCIUM SPEC-SCNC: 9.2 MG/DL (ref 8.6–10.5)
CHLORIDE SERPL-SCNC: 103 MMOL/L (ref 98–107)
CLARITY UR: ABNORMAL
CO2 SERPL-SCNC: 26 MMOL/L (ref 22–29)
COLOR UR: ABNORMAL
CREAT SERPL-MCNC: 0.81 MG/DL (ref 0.57–1)
DEPRECATED RDW RBC AUTO: 38.9 FL (ref 37–54)
EGFRCR SERPLBLD CKD-EPI 2021: 100.9 ML/MIN/1.73
EOSINOPHIL # BLD AUTO: 0 10*3/MM3 (ref 0–0.4)
EOSINOPHIL NFR BLD AUTO: 0 % (ref 0.3–6.2)
ERYTHROCYTE [DISTWIDTH] IN BLOOD BY AUTOMATED COUNT: 12.2 % (ref 12.3–15.4)
GLOBULIN UR ELPH-MCNC: 2.8 GM/DL
GLUCOSE SERPL-MCNC: 84 MG/DL (ref 65–99)
GLUCOSE UR STRIP-MCNC: NEGATIVE MG/DL
HCT VFR BLD AUTO: 39.4 % (ref 34–46.6)
HGB BLD-MCNC: 13.2 G/DL (ref 12–15.9)
HGB UR QL STRIP.AUTO: ABNORMAL
HOLD SPECIMEN: NORMAL
HOLD SPECIMEN: NORMAL
HYALINE CASTS UR QL AUTO: ABNORMAL /LPF
IMM GRANULOCYTES # BLD AUTO: 0.02 10*3/MM3 (ref 0–0.05)
IMM GRANULOCYTES NFR BLD AUTO: 0.2 % (ref 0–0.5)
KETONES UR QL STRIP: ABNORMAL
LEUKOCYTE ESTERASE UR QL STRIP.AUTO: ABNORMAL
LIPASE SERPL-CCNC: 21 U/L (ref 13–60)
LYMPHOCYTES # BLD AUTO: 1.73 10*3/MM3 (ref 0.7–3.1)
LYMPHOCYTES NFR BLD AUTO: 17.8 % (ref 19.6–45.3)
MCH RBC QN AUTO: 29.1 PG (ref 26.6–33)
MCHC RBC AUTO-ENTMCNC: 33.5 G/DL (ref 31.5–35.7)
MCV RBC AUTO: 87 FL (ref 79–97)
MONOCYTES # BLD AUTO: 0.58 10*3/MM3 (ref 0.1–0.9)
MONOCYTES NFR BLD AUTO: 6 % (ref 5–12)
NEUTROPHILS NFR BLD AUTO: 7.36 10*3/MM3 (ref 1.7–7)
NEUTROPHILS NFR BLD AUTO: 75.7 % (ref 42.7–76)
NITRITE UR QL STRIP: POSITIVE
NRBC BLD AUTO-RTO: 0 /100 WBC (ref 0–0.2)
PH UR STRIP.AUTO: 6 [PH] (ref 5–9)
PLATELET # BLD AUTO: 261 10*3/MM3 (ref 140–450)
PMV BLD AUTO: 9.9 FL (ref 6–12)
POTASSIUM SERPL-SCNC: 3.6 MMOL/L (ref 3.5–5.2)
PROT SERPL-MCNC: 7.2 G/DL (ref 6–8.5)
PROT UR QL STRIP: ABNORMAL
RBC # BLD AUTO: 4.53 10*6/MM3 (ref 3.77–5.28)
RBC # UR STRIP: ABNORMAL /HPF
REF LAB TEST METHOD: ABNORMAL
SODIUM SERPL-SCNC: 139 MMOL/L (ref 136–145)
SP GR UR STRIP: 1.03 (ref 1–1.03)
SQUAMOUS #/AREA URNS HPF: ABNORMAL /HPF
UROBILINOGEN UR QL STRIP: ABNORMAL
WBC # UR STRIP: ABNORMAL /HPF
WBC NRBC COR # BLD: 9.72 10*3/MM3 (ref 3.4–10.8)
WHOLE BLOOD HOLD COAG: NORMAL
WHOLE BLOOD HOLD SPECIMEN: NORMAL

## 2022-12-15 PROCEDURE — 96375 TX/PRO/DX INJ NEW DRUG ADDON: CPT

## 2022-12-15 PROCEDURE — 87086 URINE CULTURE/COLONY COUNT: CPT | Performed by: EMERGENCY MEDICINE

## 2022-12-15 PROCEDURE — 81003 URINALYSIS AUTO W/O SCOPE: CPT | Performed by: EMERGENCY MEDICINE

## 2022-12-15 PROCEDURE — 76775 US EXAM ABDO BACK WALL LIM: CPT

## 2022-12-15 PROCEDURE — 81015 MICROSCOPIC EXAM OF URINE: CPT | Performed by: EMERGENCY MEDICINE

## 2022-12-15 PROCEDURE — 81001 URINALYSIS AUTO W/SCOPE: CPT | Performed by: EMERGENCY MEDICINE

## 2022-12-15 PROCEDURE — 87077 CULTURE AEROBIC IDENTIFY: CPT | Performed by: EMERGENCY MEDICINE

## 2022-12-15 PROCEDURE — 80053 COMPREHEN METABOLIC PANEL: CPT | Performed by: EMERGENCY MEDICINE

## 2022-12-15 PROCEDURE — 99284 EMERGENCY DEPT VISIT MOD MDM: CPT

## 2022-12-15 PROCEDURE — 87186 SC STD MICRODIL/AGAR DIL: CPT | Performed by: EMERGENCY MEDICINE

## 2022-12-15 PROCEDURE — 25010000002 ONDANSETRON PER 1 MG: Performed by: EMERGENCY MEDICINE

## 2022-12-15 PROCEDURE — 96374 THER/PROPH/DIAG INJ IV PUSH: CPT

## 2022-12-15 PROCEDURE — 85025 COMPLETE CBC W/AUTO DIFF WBC: CPT | Performed by: EMERGENCY MEDICINE

## 2022-12-15 PROCEDURE — 25010000002 KETOROLAC TROMETHAMINE PER 15 MG: Performed by: EMERGENCY MEDICINE

## 2022-12-15 PROCEDURE — 83690 ASSAY OF LIPASE: CPT | Performed by: EMERGENCY MEDICINE

## 2022-12-15 RX ORDER — SODIUM CHLORIDE 0.9 % (FLUSH) 0.9 %
10 SYRINGE (ML) INJECTION AS NEEDED
Status: DISCONTINUED | OUTPATIENT
Start: 2022-12-15 | End: 2022-12-15 | Stop reason: HOSPADM

## 2022-12-15 RX ORDER — NAPROXEN 500 MG/1
500 TABLET ORAL 2 TIMES DAILY WITH MEALS
Qty: 14 TABLET | Refills: 0 | Status: SHIPPED | OUTPATIENT
Start: 2022-12-15 | End: 2023-03-10

## 2022-12-15 RX ORDER — CEPHALEXIN 500 MG/1
500 CAPSULE ORAL ONCE
Status: COMPLETED | OUTPATIENT
Start: 2022-12-15 | End: 2022-12-15

## 2022-12-15 RX ORDER — ONDANSETRON 4 MG/1
4 TABLET, ORALLY DISINTEGRATING ORAL EVERY 8 HOURS PRN
Qty: 9 TABLET | Refills: 0 | Status: SHIPPED | OUTPATIENT
Start: 2022-12-15 | End: 2023-01-18

## 2022-12-15 RX ORDER — ONDANSETRON 2 MG/ML
4 INJECTION INTRAMUSCULAR; INTRAVENOUS ONCE
Status: COMPLETED | OUTPATIENT
Start: 2022-12-15 | End: 2022-12-15

## 2022-12-15 RX ORDER — CEPHALEXIN 500 MG/1
500 CAPSULE ORAL 2 TIMES DAILY
Qty: 14 CAPSULE | Refills: 0 | Status: SHIPPED | OUTPATIENT
Start: 2022-12-15 | End: 2023-01-18

## 2022-12-15 RX ORDER — KETOROLAC TROMETHAMINE 15 MG/ML
15 INJECTION, SOLUTION INTRAMUSCULAR; INTRAVENOUS ONCE
Status: COMPLETED | OUTPATIENT
Start: 2022-12-15 | End: 2022-12-15

## 2022-12-15 RX ADMIN — KETOROLAC TROMETHAMINE 15 MG: 15 INJECTION, SOLUTION INTRAMUSCULAR; INTRAVENOUS at 07:23

## 2022-12-15 RX ADMIN — CEPHALEXIN 500 MG: 500 CAPSULE ORAL at 09:29

## 2022-12-15 RX ADMIN — ONDANSETRON 4 MG: 2 INJECTION INTRAMUSCULAR; INTRAVENOUS at 07:22

## 2022-12-15 NOTE — ED PROVIDER NOTES
Subjective     Flank Pain  Pain location:  R flank  Pain quality: aching    Pain radiates to:  Suprapubic region  Pain severity:  Moderate  Onset quality:  Sudden  Duration:  1 day  Timing:  Constant  Progression:  Waxing and waning  Chronicity:  New  Context comment:  Has been having urinary symptoms and today woke up with right flank pain.  Denies significant risk for sexually transmitted illness.  Denies trauma.  Relieved by:  Nothing  Worsened by:  Nothing  Ineffective treatments:  None tried  Associated symptoms: dysuria, nausea and vaginal discharge    Associated symptoms: no chest pain, no constipation, no diarrhea, no fever, no hematemesis, no hematochezia, no hematuria, no shortness of breath, no vaginal bleeding and no vomiting        Review of Systems   Constitutional: Negative for fever.   Respiratory: Negative for shortness of breath.    Cardiovascular: Negative for chest pain.   Gastrointestinal: Positive for nausea. Negative for constipation, diarrhea, hematemesis, hematochezia and vomiting.   Genitourinary: Positive for dysuria, flank pain and vaginal discharge. Negative for hematuria and vaginal bleeding.   All other systems reviewed and are negative.      Past Medical History:   Diagnosis Date   • Anemia during pregnancy in third trimester 9/19/2017   • Anxiety    • Chest pain, unspecified    • Contact dermatitis due to poison ivy    • Exanthem due to herpes zoster    • H/O echocardiogram 12/15/2015    Normal LV function with Ef 60% without regional wall motion abnormalities. Normal Ev size with normal function. Normal diastolic function. No significant valvular regurgitation or stenosis   • Herpes zoster ophthalmicus     no ocular involvement      • History of chicken pox    • History of Holter monitoring 12/15/2015    Sinus mechanism with normal average heart rate with no evicence of chronotropic incompetence. Normal burden of ventricular and supraventricular ectopic event. Symptoms correlated  only with sinus mechanism   • Migraine    • Obesity affecting pregnancy 2017   • Palpitations    • Shingles    • Temporomandibular joint disorder        No Known Allergies    Past Surgical History:   Procedure Laterality Date   •  SECTION N/A 2017    Procedure:  SECTION PRIMARY;  Surgeon: Jarad Oliver MD;  Location: [facility] LABOR DELIVERY;  Service:    • CHOLECYSTECTOMY WITH INTRAOPERATIVE CHOLANGIOGRAM N/A 2020    Procedure: LAPAROSCOPIC CHOLECYSTECTOMY WITH INTRAOPERATIVE CHOLANGIOGRAM               (c-arm#1);  Surgeon: Tristin Brownlee MD;  Location: [facility] OR;  Service: General;  Laterality: N/A;   • COLONOSCOPY N/A 2022    Procedure: COLONOSCOPY;  Surgeon: Saroj Luna MD;  Location: [facility] ENDOSCOPY;  Service: Gastroenterology;  Laterality: N/A;   • ENDOSCOPY N/A 2022    Procedure: ESOPHAGOGASTRODUODENOSCOPY;  Surgeon: Saroj Luna MD;  Location: [facility] ENDOSCOPY;  Service: Gastroenterology;  Laterality: N/A;   • FOREIGN BODY REMOVAL Left 2017    splinter removed from left great toe. nerve block used.    • TUBAL ABDOMINAL LIGATION     • WISDOM TOOTH EXTRACTION         Family History   Adopted: Yes   Problem Relation Age of Onset   • Hyperthyroidism Mother    • Esophageal cancer Mother    • Stroke Father    • Hyperthyroidism Sister    • Hyperthyroidism Sister    • Thyroid disease Sister    • ADD / ADHD Brother    • ADD / ADHD Brother    • Other Other         Back Pain       Social History     Socioeconomic History   • Marital status:    Tobacco Use   • Smoking status: Never   • Smokeless tobacco: Never   Vaping Use   • Vaping Use: Some days   • Substances: Nicotine, Flavoring   • Devices: Disposable   Substance and Sexual Activity   • Alcohol use: Yes     Comment: socially   • Drug use: Never   • Sexual activity: Defer           Objective   Physical Exam  Vitals and nursing note reviewed.   Constitutional:       Appearance: She is not  ill-appearing.   HENT:      Head: Normocephalic and atraumatic.   Eyes:      General: No scleral icterus.  Cardiovascular:      Rate and Rhythm: Normal rate and regular rhythm.   Pulmonary:      Effort: Pulmonary effort is normal.      Breath sounds: Normal breath sounds.   Abdominal:      General: Abdomen is flat.      Tenderness: There is no abdominal tenderness.   Musculoskeletal:         General: No signs of injury.   Skin:     General: Skin is warm and dry.   Neurological:      Mental Status: She is alert and oriented to person, place, and time.   Psychiatric:         Behavior: Behavior normal.         Procedures           ED Course                                           MDM  Number of Diagnoses or Management Options     Amount and/or Complexity of Data Reviewed  Clinical lab tests: reviewed  Tests in the radiology section of CPT®: reviewed  Decide to obtain previous medical records or to obtain history from someone other than the patient: yes  Review and summarize past medical records: yes        Final diagnoses:   Urinary tract infection with hematuria, site unspecified       ED Disposition  ED Disposition     ED Disposition   Discharge    Condition   Stable    Comment   --             Brooke Denise MD  [address] [place] [address] [address]  [facility] [address]  [place] [address]  [phone]    Call today  To schedule an appointment for reevaluation of a kidney infection after an emergency department visit.    [facility] EMERGENCY DEPARTMENT  [address]  [facility] [address] [phone]    As needed, If symptoms worsen at any time         Medication List      New Prescriptions    cephalexin 500 MG capsule  Commonly known as: KEFLEX  Take 1 capsule by mouth 2 (Two) Times a Day.     naproxen 500 MG EC tablet  Commonly known as: EC NAPROSYN  Take 1 tablet by mouth 2 (Two) Times a Day With Meals.     ondansetron ODT 4 MG disintegrating tablet  Commonly known as:  ZOFRAN-ODT  Place 1 tablet on the tongue Every 8 (Eight) Hours As Needed for Nausea or Vomiting.        Stop    Biotin w/ Vitamins C & E 1250-7.5-7.5 MCG-MG-UNT chewable tablet     CRANBERRY EXTRACT PO     ferrous sulfate 324 (65 Fe) MG tablet delayed-release EC tablet           Where to Get Your Medications      These medications were sent to [id number] - [place], [place] - [address] [facility] [address] [facility] AT [facility] [address] - [phone]  - [phone] [address] [address]    Phone: [phone]   · cephalexin 500 MG capsule  · naproxen 500 MG EC tablet  · ondansetron ODT 4 MG disintegrating tablet          Saroj Sales DO  12/15/22 2476

## 2022-12-15 NOTE — ED NOTES
"Pt presents to the ED with right (worse) and left sided flank pain that began this morning. Pt states she began feeling like she had a uti last night, \"an ammonia smell and not feeling like my bladder is emptying\". Pt states the last time she felt this was she had an obstructing stone. Pt states being treated for H pylori at this time.   "

## 2022-12-17 LAB — BACTERIA SPEC AEROBE CULT: ABNORMAL

## 2022-12-19 LAB
C TRACH RRNA CVX QL NAA+PROBE: NEGATIVE
N GONORRHOEA RRNA SPEC QL NAA+PROBE: NEGATIVE
TRICHOMONAS VAGINALIS PCR: NEGATIVE

## 2022-12-27 ENCOUNTER — LAB (OUTPATIENT)
Dept: LAB | Facility: HOSPITAL | Age: 27
End: 2022-12-27

## 2022-12-27 ENCOUNTER — TELEPHONE (OUTPATIENT)
Dept: FAMILY MEDICINE CLINIC | Facility: CLINIC | Age: 27
End: 2022-12-27

## 2022-12-27 DIAGNOSIS — R30.0 DYSURIA: Primary | ICD-10-CM

## 2022-12-27 DIAGNOSIS — R30.0 DYSURIA: ICD-10-CM

## 2022-12-27 PROCEDURE — 87088 URINE BACTERIA CULTURE: CPT | Performed by: PHYSICIAN ASSISTANT

## 2022-12-27 PROCEDURE — 87086 URINE CULTURE/COLONY COUNT: CPT

## 2022-12-27 PROCEDURE — 81001 URINALYSIS AUTO W/SCOPE: CPT

## 2022-12-27 PROCEDURE — 87186 SC STD MICRODIL/AGAR DIL: CPT | Performed by: PHYSICIAN ASSISTANT

## 2022-12-27 PROCEDURE — 87077 CULTURE AEROBIC IDENTIFY: CPT

## 2022-12-27 PROCEDURE — 87186 SC STD MICRODIL/AGAR DIL: CPT

## 2022-12-27 PROCEDURE — 81001 URINALYSIS AUTO W/SCOPE: CPT | Performed by: PHYSICIAN ASSISTANT

## 2022-12-27 PROCEDURE — 87086 URINE CULTURE/COLONY COUNT: CPT | Performed by: PHYSICIAN ASSISTANT

## 2022-12-27 NOTE — TELEPHONE ENCOUNTER
Patient called stating she was seen at ED on 12/15 and treated for a UTI & Kidney infection. Patient said she her urine is still cloudy, with a strong odor. She was wanting to know if Dr. Denise could call her in something. Patient said she was going to go to the Lab and give a urine sample today. Please advise [phone]      [facility]

## 2022-12-27 NOTE — TELEPHONE ENCOUNTER
Urinalysis order placed and I have notified patient this needs to be done before antibiotic can be sent.

## 2022-12-28 ENCOUNTER — TELEPHONE (OUTPATIENT)
Dept: FAMILY MEDICINE CLINIC | Facility: CLINIC | Age: 27
End: 2022-12-28

## 2022-12-28 DIAGNOSIS — N30.00 ACUTE CYSTITIS WITHOUT HEMATURIA: Primary | ICD-10-CM

## 2022-12-28 LAB
BACTERIA UR QL AUTO: ABNORMAL /HPF
BACTERIA UR QL AUTO: ABNORMAL /HPF
BILIRUB UR QL STRIP: NEGATIVE
BILIRUB UR QL STRIP: NEGATIVE
CLARITY UR: ABNORMAL
CLARITY UR: ABNORMAL
COLOR UR: YELLOW
COLOR UR: YELLOW
GLUCOSE UR STRIP-MCNC: NEGATIVE MG/DL
GLUCOSE UR STRIP-MCNC: NEGATIVE MG/DL
HGB UR QL STRIP.AUTO: ABNORMAL
HGB UR QL STRIP.AUTO: ABNORMAL
HYALINE CASTS UR QL AUTO: ABNORMAL /LPF
HYALINE CASTS UR QL AUTO: ABNORMAL /LPF
KETONES UR QL STRIP: NEGATIVE
KETONES UR QL STRIP: NEGATIVE
LEUKOCYTE ESTERASE UR QL STRIP.AUTO: ABNORMAL
LEUKOCYTE ESTERASE UR QL STRIP.AUTO: ABNORMAL
NITRITE UR QL STRIP: NEGATIVE
NITRITE UR QL STRIP: NEGATIVE
PH UR STRIP.AUTO: 6 [PH] (ref 5–8)
PH UR STRIP.AUTO: 6 [PH] (ref 5–8)
PROT UR QL STRIP: ABNORMAL
PROT UR QL STRIP: ABNORMAL
RBC # UR STRIP: ABNORMAL /HPF
RBC # UR STRIP: ABNORMAL /HPF
REF LAB TEST METHOD: ABNORMAL
REF LAB TEST METHOD: ABNORMAL
SP GR UR STRIP: 1.01 (ref 1–1.03)
SP GR UR STRIP: 1.01 (ref 1–1.03)
SQUAMOUS #/AREA URNS HPF: ABNORMAL /HPF
SQUAMOUS #/AREA URNS HPF: ABNORMAL /HPF
UROBILINOGEN UR QL STRIP: ABNORMAL
UROBILINOGEN UR QL STRIP: ABNORMAL
WBC # UR STRIP: ABNORMAL /HPF
WBC # UR STRIP: ABNORMAL /HPF

## 2022-12-28 RX ORDER — SULFAMETHOXAZOLE AND TRIMETHOPRIM 800; 160 MG/1; MG/1
1 TABLET ORAL 2 TIMES DAILY
Qty: 10 TABLET | Refills: 0 | Status: SHIPPED | OUTPATIENT
Start: 2022-12-28 | End: 2023-01-02

## 2022-12-29 LAB — BACTERIA SPEC AEROBE CULT: ABNORMAL

## 2022-12-29 NOTE — PROGRESS NOTES
Per Dr Denise, Ms. Brice has been called with recent lab results & recommendations.  Continue current medications and follow-up as planned or sooner if any problems.

## 2022-12-29 NOTE — TELEPHONE ENCOUNTER
Per Dr Denise, Ms. Brice has been called with recent lab results & recommendations.  Continue current medications and follow-up as planned or sooner if any problems.       ----- Message from Brooke Denise MD sent at 12/28/2022  8:19 AM CST -----  Looks like urinary tract infection, treat with bactrim.  Sent to pharmacy.  Will call if antibiotic needs to be adjusted after urine culture returns.  Thanks, SUZY Denise

## 2022-12-30 LAB — BACTERIA SPEC AEROBE CULT: ABNORMAL

## 2023-01-06 DIAGNOSIS — F90.2 ATTENTION DEFICIT HYPERACTIVITY DISORDER (ADHD), COMBINED TYPE: ICD-10-CM

## 2023-01-06 NOTE — TELEPHONE ENCOUNTER
Last Rx 12/05/2022  #30, NR    Next Appt  With Family Medicine (Brooke Denise MD)  01/18/2023 at 3:45 PM    Last OV 10/2022

## 2023-01-07 RX ORDER — DEXTROAMPHETAMINE SACCHARATE, AMPHETAMINE ASPARTATE MONOHYDRATE, DEXTROAMPHETAMINE SULFATE AND AMPHETAMINE SULFATE 6.25; 6.25; 6.25; 6.25 MG/1; MG/1; MG/1; MG/1
25 CAPSULE, EXTENDED RELEASE ORAL EVERY MORNING
Qty: 30 CAPSULE | Refills: 0 | Status: SHIPPED | OUTPATIENT
Start: 2023-01-07 | End: 2023-01-09

## 2023-01-09 DIAGNOSIS — F90.2 ATTENTION DEFICIT HYPERACTIVITY DISORDER (ADHD), COMBINED TYPE: ICD-10-CM

## 2023-01-09 RX ORDER — DEXTROAMPHETAMINE SACCHARATE, AMPHETAMINE ASPARTATE MONOHYDRATE, DEXTROAMPHETAMINE SULFATE AND AMPHETAMINE SULFATE 6.25; 6.25; 6.25; 6.25 MG/1; MG/1; MG/1; MG/1
25 CAPSULE, EXTENDED RELEASE ORAL EVERY MORNING
Qty: 30 CAPSULE | Refills: 0 | Status: SHIPPED | OUTPATIENT
Start: 2023-01-09 | End: 2023-03-02

## 2023-01-09 NOTE — TELEPHONE ENCOUNTER
Rx Sent 01/07/2023 by Dr. Denise to [facility], They are currently out of the [facility].    She is asking for the refill to be sent to [facility], They currently have the medication

## 2023-01-09 NOTE — TELEPHONE ENCOUNTER
Incoming Refill Request      Medication requested (name and dose):   amphetamine-dextroamphetamine XR (ADDERALL XR) 25 MG 24 hr capsule    Pharmacy where request should be sent:  [facility] ( regular pharmacy is out of supply    Additional details provided by patient: completely out   Best call back number: [phone]    Does the patient have less than a 3 day supply:  [x] Yes  [] No    Antonio Cárdenas Rep  01/09/23, 16:03 CST

## 2023-01-18 ENCOUNTER — LAB (OUTPATIENT)
Dept: LAB | Facility: HOSPITAL | Age: 28
End: 2023-01-18
Payer: COMMERCIAL

## 2023-01-18 ENCOUNTER — OFFICE VISIT (OUTPATIENT)
Dept: FAMILY MEDICINE CLINIC | Facility: CLINIC | Age: 28
End: 2023-01-18
Payer: COMMERCIAL

## 2023-01-18 VITALS
HEIGHT: 63 IN | WEIGHT: 151 LBS | DIASTOLIC BLOOD PRESSURE: 78 MMHG | BODY MASS INDEX: 26.75 KG/M2 | HEART RATE: 85 BPM | SYSTOLIC BLOOD PRESSURE: 101 MMHG | OXYGEN SATURATION: 100 %

## 2023-01-18 DIAGNOSIS — F90.2 ATTENTION DEFICIT HYPERACTIVITY DISORDER (ADHD), COMBINED TYPE: Primary | ICD-10-CM

## 2023-01-18 DIAGNOSIS — N30.01 ACUTE CYSTITIS WITH HEMATURIA: ICD-10-CM

## 2023-01-18 DIAGNOSIS — Z79.899 MEDICATION MANAGEMENT: ICD-10-CM

## 2023-01-18 LAB
BILIRUB BLD-MCNC: ABNORMAL MG/DL
CLARITY, POC: ABNORMAL
COLOR UR: ABNORMAL
GLUCOSE UR STRIP-MCNC: NEGATIVE MG/DL
KETONES UR QL: NEGATIVE
LEUKOCYTE EST, POC: ABNORMAL
NITRITE UR-MCNC: POSITIVE MG/ML
PH UR: 6 [PH] (ref 5–8)
PROT UR STRIP-MCNC: ABNORMAL MG/DL
RBC # UR STRIP: ABNORMAL /UL
SP GR UR: 1.03 (ref 1–1.03)
UROBILINOGEN UR QL: NORMAL

## 2023-01-18 PROCEDURE — 80307 DRUG TEST PRSMV CHEM ANLYZR: CPT | Performed by: FAMILY MEDICINE

## 2023-01-18 PROCEDURE — G0481 DRUG TEST DEF 8-14 CLASSES: HCPCS | Performed by: FAMILY MEDICINE

## 2023-01-18 PROCEDURE — 87086 URINE CULTURE/COLONY COUNT: CPT | Performed by: FAMILY MEDICINE

## 2023-01-18 PROCEDURE — 87186 SC STD MICRODIL/AGAR DIL: CPT | Performed by: FAMILY MEDICINE

## 2023-01-18 PROCEDURE — 99213 OFFICE O/P EST LOW 20 MIN: CPT | Performed by: FAMILY MEDICINE

## 2023-01-18 PROCEDURE — 81002 URINALYSIS NONAUTO W/O SCOPE: CPT | Performed by: FAMILY MEDICINE

## 2023-01-18 RX ORDER — NITROFURANTOIN 25; 75 MG/1; MG/1
100 CAPSULE ORAL 2 TIMES DAILY
Qty: 10 CAPSULE | Refills: 0 | Status: SHIPPED | OUTPATIENT
Start: 2023-01-18 | End: 2023-01-23

## 2023-01-20 ENCOUNTER — TELEPHONE (OUTPATIENT)
Dept: FAMILY MEDICINE CLINIC | Facility: CLINIC | Age: 28
End: 2023-01-20
Payer: COMMERCIAL

## 2023-01-20 LAB — BACTERIA SPEC AEROBE CULT: ABNORMAL

## 2023-01-20 NOTE — TELEPHONE ENCOUNTER
Per Dr. Denise, Ms. Brice has been called with recent lab results & recommendations.  Continue current medications and follow-up as planned or sooner if any problems.       ----- Message from Brooke Denise MD sent at 1/20/2023  1:12 PM CST -----  Urine culture shows E.coli again, macrobid should treat.  Thanks, SUZY Denise

## 2023-01-20 NOTE — PROGRESS NOTES
Per Dr. Denise, Ms. Brice has been called with recent lab results & recommendations.  Continue current medications and follow-up as planned or sooner if any problems.

## 2023-01-29 LAB — DRUGS UR: NORMAL

## 2023-02-01 NOTE — PROGRESS NOTES
"Chief Complaint  ADHD    Subjective    History of Present Illness {  Problem List  Visit  Diagnosis   Encounters  Notes  Medications  Labs  Result Review Imaging  Media :23}     Tanja Brice presents to [facility] [place] for     Chief Complaint   Patient presents with   • ADHD      Patient seen today for ADHD medication follow up.  Currently taking Adderall XR 25 mg daily, controlling symptoms.  Able to concentrate/complete tasks at home and work.  No adverse effects, doing ok with sleep and appetite.  She does have new urinary symptoms, concern for UTI again.  Had pan sensitive E.coli on urine culture 12/27/2023 - treated with Bactrim previously.        Current Outpatient Medications:   •  amphetamine-dextroamphetamine XR (ADDERALL XR) 25 MG 24 hr capsule, Take 1 capsule by mouth Every Morning, Disp: 30 capsule, Rfl: 0  •  naproxen (EC NAPROSYN) 500 MG EC tablet, Take 1 tablet by mouth 2 (Two) Times a Day With Meals., Disp: 14 tablet, Rfl: 0  •  omeprazole (priLOSEC) 20 MG capsule, Take 1 capsule by mouth 2 (Two) Times a Day., Disp: 60 capsule, Rfl: 2     Objective       Vital Signs:   /78   Pulse 85   Ht 160 cm (63\")   Wt 68.5 kg (151 lb)   SpO2 100%   BMI 26.75 kg/m²     Physical Exam  Vitals reviewed.   Constitutional:       General: She is not in acute distress.     Appearance: She is well-developed.   Cardiovascular:      Rate and Rhythm: Normal rate and regular rhythm.      Heart sounds: Normal heart sounds.   Pulmonary:      Effort: Pulmonary effort is normal. No respiratory distress.      Breath sounds: Normal breath sounds. No wheezing or rales.   Abdominal:      Palpations: Abdomen is soft.      Tenderness: There is no abdominal tenderness. There is no right CVA tenderness or left CVA tenderness.   Skin:     General: Skin is warm and dry.   Neurological:      Mental Status: She is alert and oriented to person, place, and " time.        Result Review :{ Labs  Result Review  Imaging  Med Tab  Media :23}   The following data was reviewed by: Brooke Denise MD on 01/18/2023    Common labs    Common Labs 8/3/22 8/3/22 9/1/22 12/15/22 12/15/22    1442 1442  0647 0647   Glucose  117 (A)   84   BUN  6   9   Creatinine  0.74   0.81   Sodium  140   139   Potassium  3.6   3.6   Chloride  104   103   Calcium  9.4   9.2   Albumin  4.30   4.40   Total Bilirubin  0.4   0.4   Alkaline Phosphatase  61   65   AST (SGOT)  15   11   ALT (SGPT)  14   8   WBC 5.91   9.72    Hemoglobin 13.5   13.2    Hematocrit 38.0   39.4    Platelets 251   261    Triglycerides   92     (A) Abnormal value             Brief Urine Lab Results  (Last result in the past 365 days)      Color   Clarity   Blood   Leuk Est   Nitrite   Protein   CREAT   Urine HCG        01/18/23 1609 Orange   Turbid   Small   Large (3+)   Positive   1+                        Assessment and Plan {CC Problem List  Visit Diagnosis  ROS  Review (Popup)  Health Maintenance  Quality  BestPractice  Medications  SmartSets  SnapShot Encounters  Media :23}   Diagnoses and all orders for this visit:    1. Attention deficit hyperactivity disorder (ADHD), combined type (Primary)    2. Medication management  -     ToxASSURE Select 13 (MW) - Urine, Clean Catch; Future  -     ToxASSURE Select 13 (MW) - Urine, Clean Catch    3. Acute cystitis with hematuria  -     POCT urinalysis dipstick, manual  -     Cancel: Urinalysis With Culture If Indicated - Urine, Clean Catch; Future  -     Urinalysis With Culture If Indicated - Urine, Clean Catch  -     nitrofurantoin, macrocrystal-monohydrate, (Macrobid) 100 MG capsule; Take 1 capsule by mouth 2 (Two) Times a Day for 5 days.  Dispense: 10 capsule; Refill: 0  -     Cancel: Urine Culture - Urine, Urine, Clean Catch  -     Urine Culture - Urine, Urine, Clean Catch      ADHD symptoms controlled  Continue current medication  Toxassure for medication  management/controlled substance monitoring  Urinalysis consistent with acute cystitis  Treat empirically with Bactrim  Urine culture to lab for additional evaluation       Follow Up {Instructions Charge Capture  Follow-up Communications :23}   Return in about 12 weeks (around 4/12/2023).  Patient was given instructions and counseling regarding her condition or for health maintenance advice. Please see specific information pulled into the AVS if appropriate.        This document has been electronically signed by Brooke Denise MD

## 2023-03-02 DIAGNOSIS — F90.2 ATTENTION DEFICIT HYPERACTIVITY DISORDER (ADHD), COMBINED TYPE: ICD-10-CM

## 2023-03-02 RX ORDER — DEXTROAMPHETAMINE SACCHARATE, AMPHETAMINE ASPARTATE MONOHYDRATE, DEXTROAMPHETAMINE SULFATE AND AMPHETAMINE SULFATE 6.25; 6.25; 6.25; 6.25 MG/1; MG/1; MG/1; MG/1
25 CAPSULE, EXTENDED RELEASE ORAL EVERY MORNING
Qty: 28 CAPSULE | Refills: 0 | Status: SHIPPED | OUTPATIENT
Start: 2023-03-02 | End: 2023-03-06

## 2023-03-06 ENCOUNTER — TELEPHONE (OUTPATIENT)
Dept: FAMILY MEDICINE CLINIC | Facility: CLINIC | Age: 28
End: 2023-03-06
Payer: COMMERCIAL

## 2023-03-06 DIAGNOSIS — F90.2 ATTENTION DEFICIT HYPERACTIVITY DISORDER (ADHD), COMBINED TYPE: Primary | ICD-10-CM

## 2023-03-06 DIAGNOSIS — R30.0 DYSURIA: ICD-10-CM

## 2023-03-06 RX ORDER — DEXTROAMPHETAMINE SACCHARATE, AMPHETAMINE ASPARTATE MONOHYDRATE, DEXTROAMPHETAMINE SULFATE AND AMPHETAMINE SULFATE 3.75; 3.75; 3.75; 3.75 MG/1; MG/1; MG/1; MG/1
30 CAPSULE, EXTENDED RELEASE ORAL DAILY
Qty: 56 CAPSULE | Refills: 0 | Status: SHIPPED | OUTPATIENT
Start: 2023-03-06 | End: 2023-03-06

## 2023-03-06 RX ORDER — DEXTROAMPHETAMINE SACCHARATE, AMPHETAMINE ASPARTATE, DEXTROAMPHETAMINE SULFATE AND AMPHETAMINE SULFATE 3.75; 3.75; 3.75; 3.75 MG/1; MG/1; MG/1; MG/1
15 TABLET ORAL 2 TIMES DAILY
Qty: 56 TABLET | Refills: 0 | Status: SHIPPED | OUTPATIENT
Start: 2023-03-06

## 2023-03-06 NOTE — TELEPHONE ENCOUNTER
Sent in adderall 15 mg.  Urinalysis with culture if needed order is in at the lab.  Thanks, SUZY Denise

## 2023-03-06 NOTE — TELEPHONE ENCOUNTER
Pt says she usually takes 25 mg XR adderall but the Pharmacy cannot get them at this time but says they can fill  15 mg 2X daily instead if we send a new script     [facility]     Pt is completely out   Pt [phone]      Pt says also has another UTI and has appt this Friday so needs to know if she wants a urine sample  And she can drop one off if Dr Denise would like

## 2023-03-10 ENCOUNTER — LAB (OUTPATIENT)
Dept: LAB | Facility: HOSPITAL | Age: 28
End: 2023-03-10
Payer: COMMERCIAL

## 2023-03-10 ENCOUNTER — OFFICE VISIT (OUTPATIENT)
Dept: FAMILY MEDICINE CLINIC | Facility: CLINIC | Age: 28
End: 2023-03-10
Payer: COMMERCIAL

## 2023-03-10 VITALS
BODY MASS INDEX: 28 KG/M2 | HEART RATE: 88 BPM | HEIGHT: 63 IN | OXYGEN SATURATION: 100 % | SYSTOLIC BLOOD PRESSURE: 120 MMHG | WEIGHT: 158 LBS | DIASTOLIC BLOOD PRESSURE: 74 MMHG

## 2023-03-10 DIAGNOSIS — R30.0 DYSURIA: ICD-10-CM

## 2023-03-10 DIAGNOSIS — F90.2 ATTENTION DEFICIT HYPERACTIVITY DISORDER (ADHD), COMBINED TYPE: Primary | ICD-10-CM

## 2023-03-10 LAB
BACTERIA UR QL AUTO: ABNORMAL /HPF
BILIRUB UR QL STRIP: NEGATIVE
CLARITY UR: CLEAR
COLOR UR: ABNORMAL
GLUCOSE UR STRIP-MCNC: NEGATIVE MG/DL
HGB UR QL STRIP.AUTO: NEGATIVE
HYALINE CASTS UR QL AUTO: ABNORMAL /LPF
KETONES UR QL STRIP: NEGATIVE
LEUKOCYTE ESTERASE UR QL STRIP.AUTO: ABNORMAL
NITRITE UR QL STRIP: NEGATIVE
PH UR STRIP.AUTO: 6.5 [PH] (ref 5–9)
PROT UR QL STRIP: NEGATIVE
RBC # UR STRIP: ABNORMAL /HPF
REF LAB TEST METHOD: ABNORMAL
SP GR UR STRIP: 1.01 (ref 1–1.03)
SQUAMOUS #/AREA URNS HPF: ABNORMAL /HPF
UROBILINOGEN UR QL STRIP: ABNORMAL
WBC # UR STRIP: ABNORMAL /HPF

## 2023-03-10 PROCEDURE — 87077 CULTURE AEROBIC IDENTIFY: CPT

## 2023-03-10 PROCEDURE — 87186 SC STD MICRODIL/AGAR DIL: CPT

## 2023-03-10 PROCEDURE — 87086 URINE CULTURE/COLONY COUNT: CPT

## 2023-03-10 PROCEDURE — 81001 URINALYSIS AUTO W/SCOPE: CPT

## 2023-03-10 PROCEDURE — 99213 OFFICE O/P EST LOW 20 MIN: CPT | Performed by: FAMILY MEDICINE

## 2023-03-10 NOTE — PROGRESS NOTES
"Chief Complaint  ADHD    Subjective    History of Present Illness {CC  Problem List  Visit  Diagnosis   Encounters  Notes  Medications  Labs  Result Review Imaging  Media :23}     Tanja Brice presents to [facility] [place] for     Chief Complaint   Patient presents with   • ADHD      Patient seen today for ADHD medication follow up.  With last refill, had to change to adderall 15 mg twice daily due to medication shortage of the adderall XR.  Doing ok with this regimen.  No adverse effects.  Concentration/focus ok.  No problems with sleep or appetite.       Current Outpatient Medications:   •  amphetamine-dextroamphetamine (Adderall) 15 MG tablet, Take 1 tablet by mouth 2 (Two) Times a Day., Disp: 56 tablet, Rfl: 0  •  omeprazole (priLOSEC) 20 MG capsule, Take 1 capsule by mouth 2 (Two) Times a Day., Disp: 60 capsule, Rfl: 2     Objective       Vital Signs:   /74   Pulse 88   Ht 160 cm (63\")   Wt 71.7 kg (158 lb)   SpO2 100%   BMI 27.99 kg/m²     Physical Exam  Vitals reviewed.   Constitutional:       General: She is not in acute distress.     Appearance: She is well-developed.   Cardiovascular:      Rate and Rhythm: Normal rate and regular rhythm.      Heart sounds: Normal heart sounds. No murmur heard.  Pulmonary:      Effort: Pulmonary effort is normal. No respiratory distress.      Breath sounds: Normal breath sounds. No wheezing or rales.   Skin:     General: Skin is warm and dry.   Neurological:      Mental Status: She is alert and oriented to person, place, and time.        Result Review :{ Labs  Result Review  Imaging  Med Tab  Media :23}   The following data was reviewed by: Brooke Denise MD on 03/10/2023    Common labs    Common Labs 8/3/22 8/3/22 9/1/22 12/15/22 12/15/22    1442 1442  0647 0647   Glucose  117 (A)   84   BUN  6   9   Creatinine  0.74   0.81   Sodium  140   139   Potassium  3.6   3.6   Chloride  104   103 "   Calcium  9.4   9.2   Albumin  4.30   4.40   Total Bilirubin  0.4   0.4   Alkaline Phosphatase  61   65   AST (SGOT)  15   11   ALT (SGPT)  14   8   WBC 5.91   9.72    Hemoglobin 13.5   13.2    Hematocrit 38.0   39.4    Platelets 251   261    Triglycerides   92     (A) Abnormal value                   Assessment and Plan {CC Problem List  Visit Diagnosis  ROS  Review (Popup)  Health Maintenance  Quality  BestPractice  Medications  SmartSets  SnapShot Encounters  Media :23}   Diagnoses and all orders for this visit:    1. Attention deficit hyperactivity disorder (ADHD), combined type (Primary)       ADHD symptoms controlled   Continue current medication regimen  No adverse effects  Controlled substance agreement on file       Follow Up {Instructions Charge Capture  Follow-up Communications :23}   Return in about 12 weeks (around 6/2/2023) for Recheck.  Patient was given instructions and counseling regarding her condition or for health maintenance advice. Please see specific information pulled into the AVS if appropriate.          This document has been electronically signed by Brooke Denise MD

## 2023-03-12 DIAGNOSIS — N30.01 ACUTE CYSTITIS WITH HEMATURIA: Primary | ICD-10-CM

## 2023-03-12 LAB — BACTERIA SPEC AEROBE CULT: ABNORMAL

## 2023-03-12 RX ORDER — NITROFURANTOIN 25; 75 MG/1; MG/1
100 CAPSULE ORAL 2 TIMES DAILY
Qty: 10 CAPSULE | Refills: 0 | Status: SHIPPED | OUTPATIENT
Start: 2023-03-12 | End: 2023-03-17

## 2023-03-31 RX ORDER — OMEPRAZOLE 20 MG/1
CAPSULE, DELAYED RELEASE ORAL
Qty: 60 CAPSULE | Refills: 0 | Status: SHIPPED | OUTPATIENT
Start: 2023-03-31

## 2023-04-12 DIAGNOSIS — F90.2 ATTENTION DEFICIT HYPERACTIVITY DISORDER (ADHD), COMBINED TYPE: ICD-10-CM

## 2023-04-12 RX ORDER — DEXTROAMPHETAMINE SACCHARATE, AMPHETAMINE ASPARTATE, DEXTROAMPHETAMINE SULFATE AND AMPHETAMINE SULFATE 3.75; 3.75; 3.75; 3.75 MG/1; MG/1; MG/1; MG/1
15 TABLET ORAL 2 TIMES DAILY
Qty: 56 TABLET | Refills: 0 | Status: SHIPPED | OUTPATIENT
Start: 2023-04-12

## 2023-04-12 NOTE — TELEPHONE ENCOUNTER
Last Rx 03/06/2023  #56, NR    Next Appt  With Family Medicine (Brooke Denise MD)  06/09/2023 at 3:45 PM    Last OV 03/10/2023

## 2023-05-26 DIAGNOSIS — F90.2 ATTENTION DEFICIT HYPERACTIVITY DISORDER (ADHD), COMBINED TYPE: ICD-10-CM

## 2023-05-26 RX ORDER — DEXTROAMPHETAMINE SACCHARATE, AMPHETAMINE ASPARTATE, DEXTROAMPHETAMINE SULFATE AND AMPHETAMINE SULFATE 3.75; 3.75; 3.75; 3.75 MG/1; MG/1; MG/1; MG/1
15 TABLET ORAL 2 TIMES DAILY
Qty: 56 TABLET | Refills: 0 | Status: SHIPPED | OUTPATIENT
Start: 2023-05-26

## 2023-05-26 NOTE — TELEPHONE ENCOUNTER
Last Rx 04/12/2023  #56,NR    Next Appt  With Family Medicine (Brooke Denise MD)  06/09/2023 at 3:45 PM    Last OV 03/10/2023
